# Patient Record
Sex: MALE | Race: WHITE | NOT HISPANIC OR LATINO | Employment: OTHER | URBAN - METROPOLITAN AREA
[De-identification: names, ages, dates, MRNs, and addresses within clinical notes are randomized per-mention and may not be internally consistent; named-entity substitution may affect disease eponyms.]

---

## 2017-01-17 ENCOUNTER — HOSPITAL ENCOUNTER (OUTPATIENT)
Dept: CARDIOLOGY | Facility: CLINIC | Age: 32
Discharge: HOME | End: 2017-01-17
Payer: MEDICAID

## 2017-01-17 ENCOUNTER — OFFICE VISIT (OUTPATIENT)
Dept: ELECTROPHYSIOLOGY | Facility: CLINIC | Age: 32
End: 2017-01-17
Payer: MEDICAID

## 2017-01-17 VITALS
DIASTOLIC BLOOD PRESSURE: 72 MMHG | WEIGHT: 168 LBS | BODY MASS INDEX: 26.37 KG/M2 | SYSTOLIC BLOOD PRESSURE: 124 MMHG | HEART RATE: 81 BPM | HEIGHT: 67 IN

## 2017-01-17 DIAGNOSIS — Z86.79 S/P ABLATION OF ATRIAL FIBRILLATION: ICD-10-CM

## 2017-01-17 DIAGNOSIS — Z86.79 S/P ABLATION OF ATRIAL FLUTTER: ICD-10-CM

## 2017-01-17 DIAGNOSIS — I42.2 HYPERTROPHIC CARDIOMYOPATHY: ICD-10-CM

## 2017-01-17 DIAGNOSIS — Z98.890 S/P ABLATION OF ATRIAL FLUTTER: ICD-10-CM

## 2017-01-17 DIAGNOSIS — G11.11 FRIEDREICH ATAXIA: ICD-10-CM

## 2017-01-17 DIAGNOSIS — I48.3 TYPICAL ATRIAL FLUTTER: ICD-10-CM

## 2017-01-17 DIAGNOSIS — Z98.890 S/P ABLATION OF ATRIAL FIBRILLATION: ICD-10-CM

## 2017-01-17 DIAGNOSIS — I48.91 ATRIAL FIBRILLATION, UNSPECIFIED TYPE: Primary | ICD-10-CM

## 2017-01-17 DIAGNOSIS — I48.92 ATRIAL FLUTTER, UNSPECIFIED TYPE: ICD-10-CM

## 2017-01-17 PROCEDURE — 99214 OFFICE O/P EST MOD 30 MIN: CPT | Mod: S$PBB,,, | Performed by: INTERNAL MEDICINE

## 2017-01-17 PROCEDURE — 93010 ELECTROCARDIOGRAM REPORT: CPT | Mod: S$PBB,,, | Performed by: INTERNAL MEDICINE

## 2017-01-17 PROCEDURE — 99213 OFFICE O/P EST LOW 20 MIN: CPT | Mod: PBBFAC | Performed by: INTERNAL MEDICINE

## 2017-01-17 PROCEDURE — 99999 PR PBB SHADOW E&M-EST. PATIENT-LVL III: CPT | Mod: PBBFAC,,, | Performed by: INTERNAL MEDICINE

## 2017-01-17 RX ORDER — METOPROLOL SUCCINATE 25 MG/1
100 TABLET, EXTENDED RELEASE ORAL DAILY
COMMUNITY

## 2017-01-17 NOTE — PROGRESS NOTES
Subjective:   HPI    Referring: Reggie Rodrigues MD  Cardiologist: Valdemar Kahn MD    I had the pleasure of seeing Anuj Caldwell in follow-up today for his history of atrial arrhythmias. He is a 31-year old with a history of Friedreich ataxia and hypertrophic cardiomyopathy, whose history of arrhythmias dates back many years. He has a longstanding history of episodic chest pain, palpitations, and shortness of breath. Roughly 5-years ago he as diagnosed with AF, and was placed on Sotalol. He continued to have breakthrough episodes of symptomatic AF (some of which resulted in ED admissions). Several months ago, he was switched to Amiodarone. Unfortunately, Mr. Caldwell was recently admitted to [facility] with symptomatic AF, which manifested with his typical symptom constellation. He spontaneously reverted to sinus rhythm, and was discharged the following day. Based on home heart rate measurements, he has at least an episode per day, which generally last 30 minutes. Episodes have become more frequent and severe over the years.    Recent cardiac studies include a 48-hour Holter monitor performed in 3/2015, which showed sinus rhythm with episodes of atrial fibrillation and atrial flutter (likely typical). Although episodes of NSVT up to 6 beats were commented on in the Holter monitor summary, strips available to me in the office in 6/2015 showed NSVT up to 3-beats duration.    On 9/2/2015, a PVI and CTI-line was performed. He was discharged on Amiodarone. A pre-discharge TTE showed a preserved EF with no evidence of hypertrophy. Mr. Caldwell had no palpitations following ablation. Amiodarone was stopped in 12/2015.  In 4/2016, Mr. Caldwell was complaining of intermittent chest pains. A 48-hour Holter monitor was performed, which was benign.     Mr. Caldwell has had no recurrent arrhythmias since his ablation.    I reviewed today's ECG tracing, which shows sinus rhythm.    Review of Systems   Constitution: Negative for decreased  appetite, malaise/fatigue, weight gain and weight loss.   HENT: Negative for sore throat.    Eyes: Negative for blurred vision.   Cardiovascular: Negative for chest pain, dyspnea on exertion, irregular heartbeat, leg swelling, near-syncope, orthopnea, palpitations, paroxysmal nocturnal dyspnea and syncope.   Respiratory: Negative for shortness of breath.    Skin: Negative for rash.   Musculoskeletal: Negative for arthritis.   Gastrointestinal: Negative for abdominal pain.   Neurological: Positive for disturbances in coordination. Negative for focal weakness.   Psychiatric/Behavioral: Negative for altered mental status.        Objective:    Physical Exam   Constitutional: He is oriented to person, place, and time. He appears well-developed and well-nourished. No distress.   HENT:   Head: Normocephalic and atraumatic.   Mouth/Throat: Oropharynx is clear and moist.   Eyes: Pupils are equal, round, and reactive to light. No scleral icterus.   Neck: Neck supple. No thyromegaly present.   Cardiovascular: Regular rhythm, normal heart sounds and normal pulses.  Tachycardia present.  Exam reveals no gallop and no friction rub.    No murmur heard.  Pulmonary/Chest: Effort normal and breath sounds normal. He has no rales.   Abdominal: Soft. Bowel sounds are normal. He exhibits no distension. There is no tenderness.   Musculoskeletal: He exhibits no edema.   Neurological: He is alert and oriented to person, place, and time.   Skin: Skin is warm and dry. No rash noted.   Psychiatric: He has a normal mood and affect. His behavior is normal.   Vitals reviewed.        Assessment:       1. Atrial fibrillation, unspecified type    2. S/P ablation of atrial fibrillation    3. S/P ablation of atrial flutter    4. Hypertrophic cardiomyopathy    5. Typical atrial flutter    6. Friedreich ataxia         Plan:   In summary, Anuj Caldwell is a 31-year old male with a history of Freidreich ataxia, hypertrophic cardiomyopathy, and symptomatic  atrial arrhythmias which were refractory to Sotalol and Amiodarone. He is now roughly 16-months status-post PVI, and feeling excellent. The plan is to continue Aspirin    He will follow-up with me in 12 months or sooner if needed.    Thank you for allowing me to participate in the care of this patient. Please do not hesitate to call me with any questions or concerns.

## 2017-05-10 DIAGNOSIS — I48.91 ATRIAL FIBRILLATION, UNSPECIFIED TYPE: ICD-10-CM

## 2017-05-10 RX ORDER — DILTIAZEM HYDROCHLORIDE 240 MG/1
240 CAPSULE, EXTENDED RELEASE ORAL 2 TIMES DAILY
Qty: 60 CAPSULE | Refills: 11 | Status: SHIPPED | OUTPATIENT
Start: 2017-05-10 | End: 2018-05-07 | Stop reason: SDUPTHER

## 2017-09-19 ENCOUNTER — HISTORICAL (OUTPATIENT)
Dept: FAMILY MEDICINE | Facility: CLINIC | Age: 32
End: 2017-09-19

## 2018-02-02 ENCOUNTER — HISTORICAL (OUTPATIENT)
Dept: ADMINISTRATIVE | Facility: HOSPITAL | Age: 33
End: 2018-02-02

## 2018-02-27 DIAGNOSIS — I48.91 ATRIAL FIBRILLATION, UNSPECIFIED TYPE: ICD-10-CM

## 2018-02-27 DIAGNOSIS — I48.92 ATRIAL FLUTTER, UNSPECIFIED TYPE: Primary | ICD-10-CM

## 2018-03-05 ENCOUNTER — OFFICE VISIT (OUTPATIENT)
Dept: ELECTROPHYSIOLOGY | Facility: CLINIC | Age: 33
End: 2018-03-05
Payer: MEDICAID

## 2018-03-05 ENCOUNTER — HOSPITAL ENCOUNTER (OUTPATIENT)
Dept: CARDIOLOGY | Facility: CLINIC | Age: 33
Discharge: HOME | End: 2018-03-05
Payer: MEDICAID

## 2018-03-05 VITALS
BODY MASS INDEX: 26.52 KG/M2 | SYSTOLIC BLOOD PRESSURE: 136 MMHG | DIASTOLIC BLOOD PRESSURE: 82 MMHG | HEART RATE: 76 BPM | HEIGHT: 66 IN | WEIGHT: 165 LBS

## 2018-03-05 DIAGNOSIS — I48.3 TYPICAL ATRIAL FLUTTER: ICD-10-CM

## 2018-03-05 DIAGNOSIS — I42.2 HYPERTROPHIC CARDIOMYOPATHY: ICD-10-CM

## 2018-03-05 DIAGNOSIS — Z98.890 S/P ABLATION OF ATRIAL FLUTTER: ICD-10-CM

## 2018-03-05 DIAGNOSIS — Z86.79 S/P ABLATION OF ATRIAL FLUTTER: ICD-10-CM

## 2018-03-05 DIAGNOSIS — I48.92 ATRIAL FLUTTER, UNSPECIFIED TYPE: ICD-10-CM

## 2018-03-05 DIAGNOSIS — G11.11 FRIEDREICH ATAXIA: ICD-10-CM

## 2018-03-05 DIAGNOSIS — I48.91 ATRIAL FIBRILLATION, UNSPECIFIED TYPE: ICD-10-CM

## 2018-03-05 DIAGNOSIS — M41.9 SCOLIOSIS, UNSPECIFIED SCOLIOSIS TYPE, UNSPECIFIED SPINAL REGION: ICD-10-CM

## 2018-03-05 DIAGNOSIS — Z86.79 S/P ABLATION OF ATRIAL FIBRILLATION: ICD-10-CM

## 2018-03-05 DIAGNOSIS — I48.0 PAROXYSMAL ATRIAL FIBRILLATION: Primary | ICD-10-CM

## 2018-03-05 DIAGNOSIS — Z98.890 S/P ABLATION OF ATRIAL FIBRILLATION: ICD-10-CM

## 2018-03-05 PROCEDURE — 99999 PR PBB SHADOW E&M-EST. PATIENT-LVL III: CPT | Mod: PBBFAC,,, | Performed by: INTERNAL MEDICINE

## 2018-03-05 PROCEDURE — 99214 OFFICE O/P EST MOD 30 MIN: CPT | Mod: S$PBB,,, | Performed by: INTERNAL MEDICINE

## 2018-03-05 PROCEDURE — 93005 ELECTROCARDIOGRAM TRACING: CPT | Mod: PBBFAC | Performed by: INTERNAL MEDICINE

## 2018-03-05 PROCEDURE — 93010 ELECTROCARDIOGRAM REPORT: CPT | Mod: S$PBB,,, | Performed by: INTERNAL MEDICINE

## 2018-03-05 PROCEDURE — 99213 OFFICE O/P EST LOW 20 MIN: CPT | Mod: PBBFAC | Performed by: INTERNAL MEDICINE

## 2018-03-05 NOTE — PROGRESS NOTES
Subjective:   HPI    EPS: Reggie Rodrigues MD  Cardiologist: Valdemar Kahn MD    I had the pleasure of seeing Anuj Caldwell in follow-up today for his history of atrial arrhythmias. He is a 32-year old with a history of Friedreich ataxia and hypertrophic cardiomyopathy, whose history of arrhythmias dates back many years. He has a longstanding history of episodic chest pain, palpitations, and shortness of breath. Roughly 5-years ago he was diagnosed with AF, and was placed on Sotalol. He continued to have breakthrough episodes of symptomatic AF (some of which resulted in ED admissions). Several months ago, he was switched to Amiodarone. Unfortunately, Mr. Caldwell was recently admitted to [facility] with symptomatic AF, which manifested with his typical symptom constellation. He spontaneously reverted to sinus rhythm, and was discharged the following day. Based on home heart rate measurements, he has at least an episode per day, which generally last 30 minutes. Episodes have become more frequent and severe over the years.    Recent cardiac studies include a 48-hour Holter monitor performed in 3/2015, which showed sinus rhythm with episodes of atrial fibrillation and atrial flutter (likely typical). Although episodes of NSVT up to 6 beats were commented on in the Holter monitor summary, strips available to me in the office in 6/2015 showed NSVT up to 3-beats duration.    In 9/2015, a PVI and CTI-line were performed. He was discharged on Amiodarone. A pre-discharge TTE showed a preserved EF with no evidence of hypertrophy. Mr. Caldwell had no palpitations following ablation. Amiodarone was stopped in 12/2015.  In 4/2016, Mr. Caldwell was complaining of intermittent chest pains. A 48-hour Holter monitor was performed, which was benign. At his 1/2017 visit he was doing well, with no evidence of AF recurrence. Today in the office Mr. Caldwell is doing well from an arrhythmia standpoint, with no documented AF recurrence.      Paulette's primary complaint in the office today is scoliosis, which is part of the Friedreich ataxia progression. This is causing him baseline shortness of breath. He is asking for a consultation with orthopedic surgery.    I reviewed today's ECG tracing, which shows sinus rhythm.    Review of Systems   Constitution: Negative for decreased appetite, malaise/fatigue, weight gain and weight loss.   HENT: Negative for sore throat.    Eyes: Negative for blurred vision.   Cardiovascular: Negative for chest pain, dyspnea on exertion, irregular heartbeat, leg swelling, near-syncope, orthopnea, palpitations, paroxysmal nocturnal dyspnea and syncope.   Respiratory: Negative for shortness of breath.    Skin: Negative for rash.   Musculoskeletal: Negative for arthritis.   Gastrointestinal: Negative for abdominal pain.   Neurological: Positive for disturbances in coordination. Negative for focal weakness.   Psychiatric/Behavioral: Negative for altered mental status.        Objective:    Physical Exam   Constitutional: He is oriented to person, place, and time. He appears well-developed and well-nourished. No distress.   HENT:   Head: Normocephalic and atraumatic.   Mouth/Throat: Oropharynx is clear and moist.   Eyes: Pupils are equal, round, and reactive to light. No scleral icterus.   Neck: Neck supple. No thyromegaly present.   Cardiovascular: Normal rate, regular rhythm, normal heart sounds and normal pulses.  Exam reveals no gallop and no friction rub.    No murmur heard.  Pulmonary/Chest: Effort normal and breath sounds normal. He has no rales.   Abdominal: Soft. Bowel sounds are normal. He exhibits no distension. There is no tenderness.   Musculoskeletal: He exhibits no edema.   Neurological: He is alert and oriented to person, place, and time.   Skin: Skin is warm and dry. No rash noted.   Psychiatric: He has a normal mood and affect. His behavior is normal.   Vitals reviewed.        Assessment:       1. Paroxysmal atrial  fibrillation    2. Typical atrial flutter    3. S/P ablation of atrial fibrillation    4. S/P ablation of atrial flutter    5. Friedreich ataxia    6. Hypertrophic cardiomyopathy         Plan:   In summary, Anuj Caldwell is a 32-year old male with a history of Freidreich ataxia, hypertrophic cardiomyopathy, and symptomatic atrial arrhythmias which were refractory to Sotalol and Amiodarone. He is now well over 2 years post PVI, with no arrhythmia recurrences.    The plan is for referral to orthopedics.    He will follow-up with me in 12 months or sooner if needed.    Thank you for allowing me to participate in the care of this patient. Please do not hesitate to call me with any questions or concerns.

## 2018-03-06 ENCOUNTER — TELEPHONE (OUTPATIENT)
Dept: ORTHOPEDICS | Facility: CLINIC | Age: 33
End: 2018-03-06

## 2018-03-06 NOTE — TELEPHONE ENCOUNTER
----- Message from Jessica Brady MA sent at 3/5/2018  3:40 PM CST -----  Please call pt to schedule new pt appt. There is a referral in from Dr Noyola. Please call pt's sister Radha Mayer at [phone]. Thanks

## 2018-03-06 NOTE — TELEPHONE ENCOUNTER
Ortho Telephone Triage Message  [phone]  Spoke with pt's sister, Radha Mayer, who states that pt - and another sibling -  have HO muscular dystrophy. States pt has been seen by PCP at [facility] in [place], but has not be seen by specialist  for muscular dystrophy since childhood. States pt's scoliosis is affecting his breathing. Requests Ortho appt for pt per Dr. Noyola/[facility] referral for scoliosis.  HX:Muscular Dystrophy  Triage Advice: Advised sister that [facility], does not, presently, see spine conditions under Medicaid and that [facility] and [facility] will be notified to offer assistance. [facility] appt contact number: [phone]  provided to sister, as well, for any timelier appt that may be scheduled there under pt's Medicaid insurance.  Resolution: Sister states understanding and will await any follow up calls and/or contact [facility], if needed.

## 2018-03-13 ENCOUNTER — TELEPHONE (OUTPATIENT)
Dept: ELECTROPHYSIOLOGY | Facility: CLINIC | Age: 33
End: 2018-03-13

## 2018-03-13 NOTE — TELEPHONE ENCOUNTER
I spoke to Radha the sister [phone], and I fax Orthopedics Referral Orders that hannah Toscano put in.  To [phone] [facility].

## 2018-03-13 NOTE — TELEPHONE ENCOUNTER
----- Message from Gaye Packer sent at 3/13/2018 10:45 AM CDT -----  Contact: pt sister/Radha  Please call pt sister at [phone]. [facility] fax# [phone] Orthopaedic services    Thank you

## 2018-04-04 ENCOUNTER — HISTORICAL (OUTPATIENT)
Dept: ADMINISTRATIVE | Facility: HOSPITAL | Age: 33
End: 2018-04-04

## 2018-05-07 DIAGNOSIS — I48.91 ATRIAL FIBRILLATION, UNSPECIFIED TYPE: ICD-10-CM

## 2018-05-07 RX ORDER — DILTIAZEM HYDROCHLORIDE 240 MG/1
240 CAPSULE, EXTENDED RELEASE ORAL DAILY
Qty: 60 CAPSULE | Refills: 11 | Status: SHIPPED | OUTPATIENT
Start: 2018-05-07 | End: 2018-06-26 | Stop reason: SDUPTHER

## 2018-06-26 DIAGNOSIS — I48.91 ATRIAL FIBRILLATION, UNSPECIFIED TYPE: ICD-10-CM

## 2018-06-26 RX ORDER — DILTIAZEM HYDROCHLORIDE 240 MG/1
240 CAPSULE, EXTENDED RELEASE ORAL DAILY
Qty: 60 CAPSULE | Refills: 11 | Status: SHIPPED | OUTPATIENT
Start: 2018-06-26 | End: 2023-06-06

## 2018-07-30 ENCOUNTER — HISTORICAL (OUTPATIENT)
Dept: FAMILY MEDICINE | Facility: CLINIC | Age: 33
End: 2018-07-30

## 2018-08-02 ENCOUNTER — HISTORICAL (OUTPATIENT)
Dept: RADIOLOGY | Facility: HOSPITAL | Age: 33
End: 2018-08-02

## 2018-08-24 ENCOUNTER — HISTORICAL (OUTPATIENT)
Dept: RADIOLOGY | Facility: HOSPITAL | Age: 33
End: 2018-08-24

## 2019-12-09 ENCOUNTER — HISTORICAL (OUTPATIENT)
Dept: ADMINISTRATIVE | Facility: HOSPITAL | Age: 34
End: 2019-12-09

## 2019-12-09 LAB
AMPHET UR QL SCN: NEGATIVE
BARBITURATE SCN PRESENT UR: NEGATIVE
BENZODIAZ UR QL SCN: POSITIVE
CANNABINOIDS UR QL SCN: NEGATIVE
COCAINE UR QL SCN: NEGATIVE
CREAT UR-MCNC: 162 MG/DL
MICROALBUMIN UR-MCNC: 17.7 MG/L (ref 0–19)
MICROALBUMIN/CREAT RATIO PNL UR: 10.9 MCG/MG CR (ref 0–29)
OPIATES UR QL SCN: NEGATIVE
PCP UR QL: NEGATIVE
PH UR STRIP.AUTO: 6 [PH] (ref 5–8)
TEMPERATURE, URINE (OHS): 20 DEGC (ref 20–25)

## 2020-02-07 ENCOUNTER — HISTORICAL (OUTPATIENT)
Dept: FAMILY MEDICINE | Facility: CLINIC | Age: 35
End: 2020-02-07

## 2021-02-17 ENCOUNTER — HOSPITAL ENCOUNTER (OUTPATIENT)
Dept: MEDSURG UNIT | Facility: HOSPITAL | Age: 36
End: 2021-03-01
Attending: FAMILY MEDICINE | Admitting: FAMILY MEDICINE

## 2021-02-17 LAB
ABS NEUT (OLG): 7.79 X10(3)/MCL (ref 2.1–9.2)
ALBUMIN SERPL-MCNC: 4.4 GM/DL (ref 3.5–5)
ALBUMIN/GLOB SERPL: 1.1 RATIO (ref 1.1–2)
ALP SERPL-CCNC: 141 UNIT/L (ref 40–150)
ALT SERPL-CCNC: 53 UNIT/L (ref 0–55)
AMPHET UR QL SCN: NEGATIVE
APAP SERPL-MCNC: <17.4 UG/ML (ref 17.4–30)
APPEARANCE, UA: CLEAR
AST SERPL-CCNC: 34 UNIT/L (ref 5–34)
BACTERIA #/AREA URNS AUTO: ABNORMAL /HPF
BARBITURATE SCN PRESENT UR: NEGATIVE
BASOPHILS # BLD AUTO: 0.1 X10(3)/MCL (ref 0–0.2)
BASOPHILS NFR BLD AUTO: 1 %
BENZODIAZ UR QL SCN: NEGATIVE
BILIRUB SERPL-MCNC: 0.5 MG/DL
BILIRUB UR QL STRIP: NEGATIVE
BILIRUBIN DIRECT+TOT PNL SERPL-MCNC: 0.2 MG/DL (ref 0–0.5)
BILIRUBIN DIRECT+TOT PNL SERPL-MCNC: 0.3 MG/DL (ref 0–0.8)
BUN SERPL-MCNC: 12 MG/DL (ref 8.9–20.6)
CALCIUM SERPL-MCNC: 10 MG/DL (ref 8.4–10.2)
CANNABINOIDS UR QL SCN: POSITIVE
CHLORIDE SERPL-SCNC: 101 MMOL/L (ref 98–107)
CO2 SERPL-SCNC: 23 MMOL/L (ref 22–29)
COCAINE UR QL SCN: NEGATIVE
COLOR UR: YELLOW
CREAT SERPL-MCNC: 1.09 MG/DL (ref 0.73–1.18)
EOSINOPHIL # BLD AUTO: 0 X10(3)/MCL (ref 0–0.9)
EOSINOPHIL NFR BLD AUTO: 0 %
ERYTHROCYTE [DISTWIDTH] IN BLOOD BY AUTOMATED COUNT: 14.2 % (ref 11.5–14.5)
ETHANOL SERPL-MCNC: <10 MG/DL
GLOBULIN SER-MCNC: 3.9 GM/DL (ref 2.4–3.5)
GLUCOSE (UA): NEGATIVE
GLUCOSE SERPL-MCNC: 87 MG/DL (ref 74–100)
HCT VFR BLD AUTO: 53.4 % (ref 40–51)
HGB BLD-MCNC: 18 GM/DL (ref 13.5–17.5)
HGB UR QL STRIP: NEGATIVE
HYALINE CASTS #/AREA URNS LPF: ABNORMAL /LPF
IMM GRANULOCYTES # BLD AUTO: 0.02 10*3/UL
IMM GRANULOCYTES NFR BLD AUTO: 0 %
KETONES UR QL STRIP: 20 MG/DL
LEUKOCYTE ESTERASE UR QL STRIP: NEGATIVE
LYMPHOCYTES # BLD AUTO: 1.9 X10(3)/MCL (ref 0.6–4.6)
LYMPHOCYTES NFR BLD AUTO: 18 %
MCH RBC QN AUTO: 29.5 PG (ref 26–34)
MCHC RBC AUTO-ENTMCNC: 33.7 GM/DL (ref 31–37)
MCV RBC AUTO: 87.5 FL (ref 80–100)
MONOCYTES # BLD AUTO: 0.8 X10(3)/MCL (ref 0.1–1.3)
MONOCYTES NFR BLD AUTO: 8 %
NEUTROPHILS # BLD AUTO: 7.79 X10(3)/MCL (ref 2.1–9.2)
NEUTROPHILS NFR BLD AUTO: 73 %
NITRITE UR QL STRIP: NEGATIVE
OPIATES UR QL SCN: NEGATIVE
PCP UR QL: NEGATIVE
PH UR STRIP.AUTO: 6.5 [PH] (ref 3–11)
PH UR STRIP: 6.5 [PH] (ref 4.5–8)
PLATELET # BLD AUTO: 304 X10(3)/MCL (ref 130–400)
PMV BLD AUTO: 10.5 FL (ref 7.4–10.4)
POTASSIUM SERPL-SCNC: 4 MMOL/L (ref 3.5–5.1)
PROT SERPL-MCNC: 8.3 GM/DL (ref 6.4–8.3)
PROT UR QL STRIP: 20 MG/DL
RBC # BLD AUTO: 6.1 X10(6)/MCL (ref 4.5–5.9)
RBC #/AREA URNS AUTO: ABNORMAL /HPF
SALICYLATES SERPL-MCNC: <5 MG/DL
SODIUM SERPL-SCNC: 138 MMOL/L (ref 136–145)
SP GR UR STRIP: 1.02 (ref 1–1.03)
SQUAMOUS #/AREA URNS LPF: ABNORMAL /LPF
TROPONIN I SERPL-MCNC: 0.04 NG/ML (ref 0–0.04)
TROPONIN I SERPL-MCNC: 0.05 NG/ML (ref 0–0.04)
TSH SERPL-ACNC: 0.53 UIU/ML (ref 0.35–4.94)
UROBILINOGEN UR STRIP-ACNC: NORMAL
WBC # SPEC AUTO: 10.6 X10(3)/MCL (ref 4.5–11)
WBC #/AREA URNS AUTO: ABNORMAL /HPF

## 2021-02-18 LAB
CK SERPL-CCNC: 99 U/L (ref 30–200)
SARS-COV-2 AG RESP QL IA.RAPID: NEGATIVE

## 2021-02-24 LAB
ABS NEUT (OLG): 3.88 X10(3)/MCL (ref 2.1–9.2)
ALBUMIN SERPL-MCNC: 4 GM/DL (ref 3.5–5)
ALBUMIN/GLOB SERPL: 1.2 RATIO (ref 1.1–2)
ALP SERPL-CCNC: 125 UNIT/L (ref 40–150)
ALT SERPL-CCNC: 59 UNIT/L (ref 0–55)
APTT PPP: 28.7 SECOND(S) (ref 23.3–37)
AST SERPL-CCNC: 25 UNIT/L (ref 5–34)
BASOPHILS # BLD AUTO: 0.1 X10(3)/MCL (ref 0–0.2)
BASOPHILS NFR BLD AUTO: 1 %
BILIRUB SERPL-MCNC: 0.4 MG/DL
BILIRUBIN DIRECT+TOT PNL SERPL-MCNC: 0.2 MG/DL (ref 0–0.5)
BILIRUBIN DIRECT+TOT PNL SERPL-MCNC: 0.2 MG/DL (ref 0–0.8)
BNP BLD-MCNC: <10 PG/ML
BUN SERPL-MCNC: 12.2 MG/DL (ref 8.9–20.6)
CALCIUM SERPL-MCNC: 9 MG/DL (ref 8.4–10.2)
CHLORIDE SERPL-SCNC: 101 MMOL/L (ref 98–107)
CO2 SERPL-SCNC: 23 MMOL/L (ref 22–29)
CREAT SERPL-MCNC: 1.18 MG/DL (ref 0.73–1.18)
EOSINOPHIL # BLD AUTO: 0.5 X10(3)/MCL (ref 0–0.9)
EOSINOPHIL NFR BLD AUTO: 6 %
ERYTHROCYTE [DISTWIDTH] IN BLOOD BY AUTOMATED COUNT: 13.7 % (ref 11.5–14.5)
GLOBULIN SER-MCNC: 3.2 GM/DL (ref 2.4–3.5)
GLUCOSE SERPL-MCNC: 121 MG/DL (ref 74–100)
HCT VFR BLD AUTO: 49.7 % (ref 40–51)
HGB BLD-MCNC: 16.9 GM/DL (ref 13.5–17.5)
IMM GRANULOCYTES # BLD AUTO: 0.02 10*3/UL
IMM GRANULOCYTES NFR BLD AUTO: 0 %
INR PPP: 1.02 (ref 0.9–1.2)
LYMPHOCYTES # BLD AUTO: 3.2 X10(3)/MCL (ref 0.6–4.6)
LYMPHOCYTES NFR BLD AUTO: 36 %
MCH RBC QN AUTO: 29.4 PG (ref 26–34)
MCHC RBC AUTO-ENTMCNC: 34 GM/DL (ref 31–37)
MCV RBC AUTO: 86.6 FL (ref 80–100)
MONOCYTES # BLD AUTO: 1.1 X10(3)/MCL (ref 0.1–1.3)
MONOCYTES NFR BLD AUTO: 13 %
NEUTROPHILS # BLD AUTO: 3.88 X10(3)/MCL (ref 2.1–9.2)
NEUTROPHILS NFR BLD AUTO: 44 %
PLATELET # BLD AUTO: 272 X10(3)/MCL (ref 130–400)
PMV BLD AUTO: 9.9 FL (ref 7.4–10.4)
POTASSIUM SERPL-SCNC: 3.8 MMOL/L (ref 3.5–5.1)
PROT SERPL-MCNC: 7.2 GM/DL (ref 6.4–8.3)
PROTHROMBIN TIME: 13.1 SECOND(S) (ref 11.9–14.4)
RBC # BLD AUTO: 5.74 X10(6)/MCL (ref 4.5–5.9)
SODIUM SERPL-SCNC: 136 MMOL/L (ref 136–145)
TROPONIN I SERPL-MCNC: 0.12 NG/ML (ref 0–0.04)
WBC # SPEC AUTO: 8.8 X10(3)/MCL (ref 4.5–11)

## 2021-02-25 LAB
ABS NEUT (OLG): 2.13 X10(3)/MCL (ref 2.1–9.2)
ALBUMIN SERPL-MCNC: 3.9 GM/DL (ref 3.5–5)
ALBUMIN/GLOB SERPL: 1.2 RATIO (ref 1.1–2)
ALP SERPL-CCNC: 129 UNIT/L (ref 40–150)
ALT SERPL-CCNC: 114 UNIT/L (ref 0–55)
AMPHET UR QL SCN: NEGATIVE
APTT PPP: 135.2 SECOND(S) (ref 23.3–37)
APTT PPP: 47.2 SECOND(S) (ref 23.3–37)
APTT PPP: 51 SECOND(S) (ref 23.3–37)
AST SERPL-CCNC: 67 UNIT/L (ref 5–34)
BARBITURATE SCN PRESENT UR: NEGATIVE
BASOPHILS # BLD AUTO: 0.1 X10(3)/MCL (ref 0–0.2)
BASOPHILS NFR BLD AUTO: 1 %
BENZODIAZ UR QL SCN: NEGATIVE
BILIRUB SERPL-MCNC: 0.4 MG/DL
BILIRUBIN DIRECT+TOT PNL SERPL-MCNC: 0.2 MG/DL (ref 0–0.5)
BILIRUBIN DIRECT+TOT PNL SERPL-MCNC: 0.2 MG/DL (ref 0–0.8)
BUN SERPL-MCNC: 12.8 MG/DL (ref 8.9–20.6)
CALCIUM SERPL-MCNC: 9.1 MG/DL (ref 8.4–10.2)
CANNABINOIDS UR QL SCN: NEGATIVE
CHLORIDE SERPL-SCNC: 100 MMOL/L (ref 98–107)
CO2 SERPL-SCNC: 30 MMOL/L (ref 22–29)
COCAINE UR QL SCN: NEGATIVE
CREAT SERPL-MCNC: 1.43 MG/DL (ref 0.73–1.18)
EOSINOPHIL # BLD AUTO: 0.5 X10(3)/MCL (ref 0–0.9)
EOSINOPHIL NFR BLD AUTO: 7 %
ERYTHROCYTE [DISTWIDTH] IN BLOOD BY AUTOMATED COUNT: 13.9 % (ref 11.5–14.5)
GLOBULIN SER-MCNC: 3.2 GM/DL (ref 2.4–3.5)
GLUCOSE SERPL-MCNC: 91 MG/DL (ref 74–100)
HCT VFR BLD AUTO: 53 % (ref 40–51)
HGB BLD-MCNC: 17.5 GM/DL (ref 13.5–17.5)
IMM GRANULOCYTES # BLD AUTO: 0.01 10*3/UL
IMM GRANULOCYTES NFR BLD AUTO: 0 %
LYMPHOCYTES # BLD AUTO: 3.4 X10(3)/MCL (ref 0.6–4.6)
LYMPHOCYTES NFR BLD AUTO: 50 %
MCH RBC QN AUTO: 29.8 PG (ref 26–34)
MCHC RBC AUTO-ENTMCNC: 33 GM/DL (ref 31–37)
MCV RBC AUTO: 90.3 FL (ref 80–100)
MONOCYTES # BLD AUTO: 0.7 X10(3)/MCL (ref 0.1–1.3)
MONOCYTES NFR BLD AUTO: 10 %
NEUTROPHILS # BLD AUTO: 2.13 X10(3)/MCL (ref 2.1–9.2)
NEUTROPHILS NFR BLD AUTO: 32 %
OPIATES UR QL SCN: NEGATIVE
PCP UR QL: NEGATIVE
PH UR STRIP.AUTO: 6.5 [PH] (ref 3–11)
PLATELET # BLD AUTO: 253 X10(3)/MCL (ref 130–400)
PMV BLD AUTO: 10.4 FL (ref 7.4–10.4)
POTASSIUM SERPL-SCNC: 4.1 MMOL/L (ref 3.5–5.1)
PROT SERPL-MCNC: 7.1 GM/DL (ref 6.4–8.3)
RBC # BLD AUTO: 5.87 X10(6)/MCL (ref 4.5–5.9)
SODIUM SERPL-SCNC: 139 MMOL/L (ref 136–145)
TROPONIN I SERPL-MCNC: 0.05 NG/ML (ref 0–0.04)
TROPONIN I SERPL-MCNC: 0.08 NG/ML (ref 0–0.04)
TROPONIN I SERPL-MCNC: 0.19 NG/ML (ref 0–0.04)
TSH SERPL-ACNC: 1.09 UIU/ML (ref 0.35–4.94)
WBC # SPEC AUTO: 6.8 X10(3)/MCL (ref 4.5–11)

## 2021-02-26 LAB
ABS NEUT (OLG): 1.96 X10(3)/MCL (ref 2.1–9.2)
ALBUMIN SERPL-MCNC: 3.7 GM/DL (ref 3.5–5)
ALBUMIN/GLOB SERPL: 1.3 RATIO (ref 1.1–2)
ALP SERPL-CCNC: 113 UNIT/L (ref 40–150)
ALT SERPL-CCNC: 89 UNIT/L (ref 0–55)
APTT PPP: 60.4 SECOND(S) (ref 23.3–37)
AST SERPL-CCNC: 32 UNIT/L (ref 5–34)
BASOPHILS # BLD AUTO: 0.1 X10(3)/MCL (ref 0–0.2)
BASOPHILS NFR BLD AUTO: 1 %
BILIRUB SERPL-MCNC: 0.4 MG/DL
BILIRUBIN DIRECT+TOT PNL SERPL-MCNC: 0.2 MG/DL (ref 0–0.5)
BILIRUBIN DIRECT+TOT PNL SERPL-MCNC: 0.2 MG/DL (ref 0–0.8)
BUN SERPL-MCNC: 12.9 MG/DL (ref 8.9–20.6)
CALCIUM SERPL-MCNC: 8.9 MG/DL (ref 8.4–10.2)
CHLORIDE SERPL-SCNC: 100 MMOL/L (ref 98–107)
CHOLEST SERPL-MCNC: 181 MG/DL
CHOLEST/HDLC SERPL: 4 {RATIO} (ref 0–5)
CO2 SERPL-SCNC: 28 MMOL/L (ref 22–29)
CREAT SERPL-MCNC: 1.3 MG/DL (ref 0.73–1.18)
EOSINOPHIL # BLD AUTO: 0.6 X10(3)/MCL (ref 0–0.9)
EOSINOPHIL NFR BLD AUTO: 8 %
ERYTHROCYTE [DISTWIDTH] IN BLOOD BY AUTOMATED COUNT: 13.8 % (ref 11.5–14.5)
GLOBULIN SER-MCNC: 2.9 GM/DL (ref 2.4–3.5)
GLUCOSE SERPL-MCNC: 95 MG/DL (ref 74–100)
HCT VFR BLD AUTO: 51.7 % (ref 40–51)
HDLC SERPL-MCNC: 41 MG/DL (ref 35–60)
HGB BLD-MCNC: 16.7 GM/DL (ref 13.5–17.5)
HIV 1+2 AB+HIV1 P24 AG SERPL QL IA: NONREACTIVE
IMM GRANULOCYTES # BLD AUTO: 0.01 10*3/UL
IMM GRANULOCYTES NFR BLD AUTO: 0 %
LDLC SERPL CALC-MCNC: 100 MG/DL (ref 50–140)
LYMPHOCYTES # BLD AUTO: 3.2 X10(3)/MCL (ref 0.6–4.6)
LYMPHOCYTES NFR BLD AUTO: 50 %
MCH RBC QN AUTO: 29.5 PG (ref 26–34)
MCHC RBC AUTO-ENTMCNC: 32.3 GM/DL (ref 31–37)
MCV RBC AUTO: 91.3 FL (ref 80–100)
MONOCYTES # BLD AUTO: 0.6 X10(3)/MCL (ref 0.1–1.3)
MONOCYTES NFR BLD AUTO: 10 %
NEUTROPHILS # BLD AUTO: 1.96 X10(3)/MCL (ref 2.1–9.2)
NEUTROPHILS NFR BLD AUTO: 30 %
PLATELET # BLD AUTO: 226 X10(3)/MCL (ref 130–400)
PMV BLD AUTO: 10.5 FL (ref 7.4–10.4)
POTASSIUM SERPL-SCNC: 4.3 MMOL/L (ref 3.5–5.1)
PROT SERPL-MCNC: 6.6 GM/DL (ref 6.4–8.3)
RBC # BLD AUTO: 5.66 X10(6)/MCL (ref 4.5–5.9)
SODIUM SERPL-SCNC: 138 MMOL/L (ref 136–145)
T PALLIDUM AB SER QL: NONREACTIVE
TRIGL SERPL-MCNC: 199 MG/DL (ref 34–140)
VLDLC SERPL CALC-MCNC: 40 MG/DL
WBC # SPEC AUTO: 6.4 X10(3)/MCL (ref 4.5–11)

## 2021-02-27 LAB
ABS NEUT (OLG): 2.89 X10(3)/MCL (ref 2.1–9.2)
ALBUMIN SERPL-MCNC: 3.8 GM/DL (ref 3.5–5)
ALBUMIN/GLOB SERPL: 1.2 RATIO (ref 1.1–2)
ALP SERPL-CCNC: 112 UNIT/L (ref 40–150)
ALT SERPL-CCNC: 76 UNIT/L (ref 0–55)
APTT PPP: 50.2 SECOND(S) (ref 23.3–37)
AST SERPL-CCNC: 25 UNIT/L (ref 5–34)
BASOPHILS # BLD AUTO: 0.1 X10(3)/MCL (ref 0–0.2)
BASOPHILS NFR BLD AUTO: 1 %
BILIRUB SERPL-MCNC: 0.3 MG/DL
BILIRUBIN DIRECT+TOT PNL SERPL-MCNC: 0.1 MG/DL (ref 0–0.5)
BILIRUBIN DIRECT+TOT PNL SERPL-MCNC: 0.2 MG/DL (ref 0–0.8)
BUN SERPL-MCNC: 12.7 MG/DL (ref 8.9–20.6)
CALCIUM SERPL-MCNC: 9.1 MG/DL (ref 8.4–10.2)
CHLORIDE SERPL-SCNC: 102 MMOL/L (ref 98–107)
CO2 SERPL-SCNC: 26 MMOL/L (ref 22–29)
CREAT SERPL-MCNC: 1.11 MG/DL (ref 0.73–1.18)
EOSINOPHIL # BLD AUTO: 0.6 X10(3)/MCL (ref 0–0.9)
EOSINOPHIL NFR BLD AUTO: 8 %
ERYTHROCYTE [DISTWIDTH] IN BLOOD BY AUTOMATED COUNT: 13.9 % (ref 11.5–14.5)
GLOBULIN SER-MCNC: 3.1 GM/DL (ref 2.4–3.5)
GLUCOSE SERPL-MCNC: 99 MG/DL (ref 74–100)
HCT VFR BLD AUTO: 50.3 % (ref 40–51)
HGB BLD-MCNC: 16.6 GM/DL (ref 13.5–17.5)
IMM GRANULOCYTES # BLD AUTO: 0.02 10*3/UL
IMM GRANULOCYTES NFR BLD AUTO: 0 %
LYMPHOCYTES # BLD AUTO: 3 X10(3)/MCL (ref 0.6–4.6)
LYMPHOCYTES NFR BLD AUTO: 41 %
MCH RBC QN AUTO: 30 PG (ref 26–34)
MCHC RBC AUTO-ENTMCNC: 33 GM/DL (ref 31–37)
MCV RBC AUTO: 90.8 FL (ref 80–100)
MONOCYTES # BLD AUTO: 0.7 X10(3)/MCL (ref 0.1–1.3)
MONOCYTES NFR BLD AUTO: 10 %
NEUTROPHILS # BLD AUTO: 2.89 X10(3)/MCL (ref 2.1–9.2)
NEUTROPHILS NFR BLD AUTO: 39 %
PLATELET # BLD AUTO: 245 X10(3)/MCL (ref 130–400)
PMV BLD AUTO: 10.7 FL (ref 7.4–10.4)
POTASSIUM SERPL-SCNC: 4.2 MMOL/L (ref 3.5–5.1)
PROT SERPL-MCNC: 6.9 GM/DL (ref 6.4–8.3)
RBC # BLD AUTO: 5.54 X10(6)/MCL (ref 4.5–5.9)
SODIUM SERPL-SCNC: 139 MMOL/L (ref 136–145)
WBC # SPEC AUTO: 7.3 X10(3)/MCL (ref 4.5–11)

## 2021-02-28 LAB
ABS NEUT (OLG): 3.18 X10(3)/MCL (ref 2.1–9.2)
ALBUMIN SERPL-MCNC: 3.8 GM/DL (ref 3.5–5)
ALBUMIN/GLOB SERPL: 1.2 RATIO (ref 1.1–2)
ALP SERPL-CCNC: 111 UNIT/L (ref 40–150)
ALT SERPL-CCNC: 91 UNIT/L (ref 0–55)
AST SERPL-CCNC: 38 UNIT/L (ref 5–34)
BASOPHILS # BLD AUTO: 0.1 X10(3)/MCL (ref 0–0.2)
BASOPHILS NFR BLD AUTO: 1 %
BILIRUB SERPL-MCNC: 0.3 MG/DL
BILIRUBIN DIRECT+TOT PNL SERPL-MCNC: 0.1 MG/DL (ref 0–0.8)
BILIRUBIN DIRECT+TOT PNL SERPL-MCNC: 0.2 MG/DL (ref 0–0.5)
BUN SERPL-MCNC: 14.2 MG/DL (ref 8.9–20.6)
CALCIUM SERPL-MCNC: 9.2 MG/DL (ref 8.4–10.2)
CHLORIDE SERPL-SCNC: 103 MMOL/L (ref 98–107)
CO2 SERPL-SCNC: 23 MMOL/L (ref 22–29)
CREAT SERPL-MCNC: 1.15 MG/DL (ref 0.73–1.18)
EOSINOPHIL # BLD AUTO: 0.6 X10(3)/MCL (ref 0–0.9)
EOSINOPHIL NFR BLD AUTO: 8 %
ERYTHROCYTE [DISTWIDTH] IN BLOOD BY AUTOMATED COUNT: 13.7 % (ref 11.5–14.5)
GLOBULIN SER-MCNC: 3.2 GM/DL (ref 2.4–3.5)
GLUCOSE SERPL-MCNC: 95 MG/DL (ref 74–100)
HCT VFR BLD AUTO: 49.8 % (ref 40–51)
HGB BLD-MCNC: 16.4 GM/DL (ref 13.5–17.5)
IMM GRANULOCYTES # BLD AUTO: 0.01 10*3/UL
IMM GRANULOCYTES NFR BLD AUTO: 0 %
LYMPHOCYTES # BLD AUTO: 3 X10(3)/MCL (ref 0.6–4.6)
LYMPHOCYTES NFR BLD AUTO: 40 %
MCH RBC QN AUTO: 29.5 PG (ref 26–34)
MCHC RBC AUTO-ENTMCNC: 32.9 GM/DL (ref 31–37)
MCV RBC AUTO: 89.7 FL (ref 80–100)
MONOCYTES # BLD AUTO: 0.7 X10(3)/MCL (ref 0.1–1.3)
MONOCYTES NFR BLD AUTO: 9 %
NEUTROPHILS # BLD AUTO: 3.18 X10(3)/MCL (ref 2.1–9.2)
NEUTROPHILS NFR BLD AUTO: 42 %
PLATELET # BLD AUTO: 228 X10(3)/MCL (ref 130–400)
PMV BLD AUTO: 10.6 FL (ref 7.4–10.4)
POTASSIUM SERPL-SCNC: 4 MMOL/L (ref 3.5–5.1)
PROT SERPL-MCNC: 7 GM/DL (ref 6.4–8.3)
RBC # BLD AUTO: 5.55 X10(6)/MCL (ref 4.5–5.9)
SODIUM SERPL-SCNC: 138 MMOL/L (ref 136–145)
WBC # SPEC AUTO: 7.6 X10(3)/MCL (ref 4.5–11)

## 2021-09-01 ENCOUNTER — HISTORICAL (OUTPATIENT)
Dept: ADMINISTRATIVE | Facility: HOSPITAL | Age: 36
End: 2021-09-01

## 2021-09-01 LAB
APPEARANCE, UA: ABNORMAL
BACTERIA SPEC CULT: ABNORMAL /HPF
BILIRUB UR QL STRIP: NEGATIVE
COLOR UR: YELLOW
GLUCOSE (UA): NEGATIVE
HGB UR QL STRIP: ABNORMAL
KETONES UR QL STRIP: NEGATIVE
LEUKOCYTE ESTERASE UR QL STRIP: ABNORMAL
NITRITE UR QL STRIP: NEGATIVE
PH UR STRIP: 6.5 [PH] (ref 5–9)
PROT UR QL STRIP: ABNORMAL
RBC #/AREA URNS HPF: 25 /HPF (ref 0–2)
SP GR UR STRIP: 1.01 (ref 1–1.03)
SQUAMOUS EPITHELIAL, UA: ABNORMAL /HPF (ref 0–4)
UROBILINOGEN UR STRIP-ACNC: 1
WBC #/AREA URNS HPF: 318 /HPF (ref 0–3)

## 2021-09-16 ENCOUNTER — HISTORICAL (OUTPATIENT)
Dept: ADMINISTRATIVE | Facility: HOSPITAL | Age: 36
End: 2021-09-16

## 2021-09-16 LAB
APPEARANCE, UA: ABNORMAL
BACTERIA SPEC CULT: ABNORMAL /HPF
BILIRUB UR QL STRIP: NEGATIVE
COLOR UR: YELLOW
GLUCOSE (UA): NEGATIVE
HGB UR QL STRIP: ABNORMAL
KETONES UR QL STRIP: NEGATIVE
LEUKOCYTE ESTERASE UR QL STRIP: ABNORMAL
NITRITE UR QL STRIP: NEGATIVE
PH UR STRIP: 7 [PH] (ref 5–9)
PROT UR QL STRIP: NEGATIVE
RBC #/AREA URNS HPF: ABNORMAL /[HPF]
SP GR UR STRIP: 1.01 (ref 1–1.03)
SQUAMOUS EPITHELIAL, UA: ABNORMAL /HPF (ref 0–4)
UROBILINOGEN UR STRIP-ACNC: 1
WBC #/AREA URNS HPF: 56 /HPF (ref 0–3)

## 2021-12-10 ENCOUNTER — HISTORICAL (OUTPATIENT)
Dept: LAB | Facility: HOSPITAL | Age: 36
End: 2021-12-10

## 2022-04-07 ENCOUNTER — HISTORICAL (OUTPATIENT)
Dept: ADMINISTRATIVE | Facility: HOSPITAL | Age: 37
End: 2022-04-07
Payer: MEDICAID

## 2022-04-24 VITALS
OXYGEN SATURATION: 95 % | BODY MASS INDEX: 28.6 KG/M2 | SYSTOLIC BLOOD PRESSURE: 150 MMHG | DIASTOLIC BLOOD PRESSURE: 92 MMHG | WEIGHT: 167.56 LBS | HEIGHT: 64 IN

## 2022-04-28 NOTE — ED PROVIDER NOTES
Patient:   Anuj Caldwell            MRN: [id number]            FIN: [id number]               Age:   35 years     Sex:  Male     :  1985   Associated Diagnoses:   None   Author:   Bear Bhat MD      Patient was seen apparently by Psychiatry this morning. No note is present at this time. Awaiting Psych's recommendations. Patient has no complaints. He is not suicidal at this time. His exam is unchanged from yesterday. Heart RRR, Chest clear,abd soft, nontender, extremities nontender, no swelling, neuro baseline weakness in lower extremities. Skin no breakdown noted. Will disposition based on Psychiatry recommendations.

## 2022-04-28 NOTE — ED PROVIDER NOTES
Patient:   Anuj Caldwell            MRN: [id number]            FIN: [id number]               Age:   35 years     Sex:  Male     :  1985   Associated Diagnoses:   None   Author:   Home ROSE, Bear Solis      7504: Patient continues to await Psychiatric placement. A CEC has been placed to my knowledge.  Has no complaints today.  He is aware that he may continue to remain in the ED for some time as placement will be a continued difficulty. Exam is within normal limits, baseline with slurred speech, good upper motor and sensory and 3/5 strength in lower extremities with sensation intact. Continue efforts at placement.
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
## 2022-04-28 NOTE — ED PROVIDER NOTES
Patient:   Anuj Caldwell            MRN: [id number]            FIN: [id number]               Age:   35 years     Sex:  Male     :  1985   Associated Diagnoses:   None   Author:   Bear Bhat MD      Patient complained tonight of left sided chest pain described as sharp, left sided chest pain x 1 hour with occasional chest pressure. Patient reports no associated dyspnea, n/v diaphoresis.    On exam, Heart RRR, Lungs clear  Abdomen soft,nontender  Extremities no edema  Neuro exam: Baseline as previously described    EKG at : SR at 120bpm, normal axis and intervals, (-) ST-T changes, nonspecific Twave changes in inferior leads  CXR: No acute pathology    A troponin returned with level: 0.118    A/P:  Chest pain/tachycardia - elevated troponin - Aspirin PO given. Tylenol with codeine for pain. Patient reports he cannot take nitroglycerin based on his cardiologist advice.   Case discussed with medicine for further evaluation.

## 2022-04-28 NOTE — H&P
"   Patient:   Anuj Caldwell            MRN: [id number]            FIN: [id number]               Age:   35 years     Sex:  Male     :  1985   Associated Diagnoses:   None   Author:   Radhika Trent MD      Basic Information   Source of history:  Self.    Referral source:  Emergency department.    History limitation:  None.       Chief Complaint   SI and had a gun and fired it.     chest pain      History of Present Illness   This is a 35-year-old male with a past medical history of depression, anxiety, seizures, spinal muscular dystrophy, dilated cardiomyopathy, bipolar, Jose L's ataxia, hypertension and MS who presented to the ED about one week ago with  initial complaints of suicidal ideations. Per EMR he reported that he had been taking testosterone tablets and had not been acting like himself. The OPC had reported that he wanted to be a serial killer and that he had fired his firearm twice. The police came and an OPC was placed on the patient via the caregiver. He has been waiting in [facility] ED for psychiatric placement for past week.   Today patient began to complain of chest pain to his left pectoralis muscle.  Patient describes the chest pain as "sticking, compressing," nonradiating with palpitations.  He denies nausea, vomiting, sweating, shortness of breath, fever, prior infection, chills or abdominal pain.  He currently denies suicidal ideations.   FM was consult for atypical chest pain and HTN.    Of note, he had been initially transferred to [facility] and was to be seen by Dr. Lino in [place] however he was brought back to [facility] because the patient felt  that they did not have proper wheelchair accommodations at the facility.  A behavior consult via telehealth was administered which recommended getting collateral information from his primary care doctor, mother, neighbor and work of his character, past behaviors and concerns. Due to poor documentation in the chart per " regarding impulsive behaviors. If he is not a danger to himself or others, step down intensive outpatient program was recommended if inpatient placement could not be found.     Allergies: amoxicillin  Family history: High blood pressure  mother  Social Hx: quit 67 years ago smoked one cigarette per day for 26 years, social drinker, illicit drug:  marijuana  surgical history: Ablation of atrioventricular node    ED course: Bp 141/101, , 93 % RA , troponin 0.118 (initial troponin 0.041), TSH 0.52, acetaminophen lesson 17.4, salicylate < 5, UDS positive cannabis, urine culture negative, chest x-ray with no acute thoracic abnormality. Patient was given aspirin 325 mg dose       Review of Systems   Constitutional:  No fever, No chills, No sweats, No fatigue.    Eye:  No icterus, No blurring, No double vision.    Ear/Nose/Mouth/Throat:  Negative, No decreased hearing, No ear pain, No nasal congestion.    Respiratory:  No shortness of breath, No cough, No sputum production, No hemoptysis, No wheezing.    Cardiovascular:  Palpitations, No bradycardia, No tachycardia, No peripheral edema.         Chest pain: Left sided, Anterior, pectorisl muscle.    Gastrointestinal:  No nausea, No vomiting, No diarrhea, No constipation.    Genitourinary:  No dysuria, No hematuria.    Hematology/Lymphatics:  No bruising tendency, No bleeding tendency.    Endocrine:  No excessive thirst, No polyuria, No cold intolerance.    Immunologic:  No recurrent fevers, No recurrent infections.    Musculoskeletal:  paralysis of BLE, able to move toes minimally, No joint pain, No muscle pain.    Integumentary:  No rash, No abrasions, No skin lesion.    Neurologic:  Alert and oriented X4.    Psychiatric:  No anxiety, No depression.    ROS reviewed as documented in chart      Health Status   Allergies:    Allergic Reactions (Selected)  Severity Not Documented  Amoxicillin- No reactions were documented.,    Allergies (1)  Active Reaction  amoxicillin None Documented     Current medications:  (Selected)   Prescriptions  Prescribed  JI Transfer Board: JI Transfer Board, See Instructions, JI Transfer board with increased . 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, reduced mobility Z74.09, cardiomyopathy I42.9., # 1 units, 0 Refill(s)  Flexeril 5 mg oral tablet: 5 mg = 1 tab(s), Oral, TID, PRN PRN spasm, # 60 tab(s), 0 Refill(s), Pharmacy: [facility], 170, cm, Height/Length Dosing, 02/11/21 14:34:00 CST, 96.1, kg, Weight Dosing, 02/04/20 14:55:00 CST  Nebulizer with mask and tubing: Nebulizer with mask and tubing, See Instructions, Nebulizer with mask and tubing  Use with albuterol breathing treatments  Disp: 1 each  Refills: 0, # 1 EA, 0 Refill(s)  Occupational Therapy: Occupational Therapy, See Instructions, Evaluate  and Treat  Shyam ataxia, # 1 EA, 0 Refill(s)  Physical therapy: Physical therapy, See Instructions, Physical therapy  Evaluate and treat  Shyam ataxia, # 1 EA, 0 Refill(s)  Tempurpedic Bed: Tempurpedic Bed, See Instructions, Bed with adjustable headboard and adjustable footboard. 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, reduced mobility Z74.09, cardiomyopathy I42.9., # 1 units, 0 Refill(s)  Wheelchair evaluation: Wheelchair evaluation, See Instructions, Wheelchair evaluation  Shyam ataxia, # 1 EA, 0 Refill(s)  Zofran 4 mg oral tablet: 4 mg = 1 tab(s), Oral, q8hr, PRN PRN as needed for nausea/vomiting, # 15 tab(s), 0 Refill(s), Pharmacy: [facility], 170, cm, Height/Length Dosing, 02/04/20 14:55:00 CST, 96.1, kg, Weight Dosing, 02/04/20 14:55:00 CST  Zofran 8 mg oral tablet: 8 mg = 1 tab(s), Oral, q8hr, PRN PRN as needed for nausea/vomiting, # 21 tab(s), 0 Refill(s), Pharmacy: [place]  albuterol 2.5 mg/3 mL (0.083%) inhalation solution: See Instructions, nebulize contents of 1 vial EVERY 4 HOURS AS NEEDED wheezing, # 180 mL, 6 Refill(s),  Pharmacy: [facility], 170, cm, Height/Length Dosing, 02/11/21 14:34:00 CST, 96.1, kg, Weight Dosing, 02/04/20 14:55:00 CST  buPROPion 150 mg oral tablet, extended release: 2 tablets, Oral, Daily, # 180 tab(s), 3 Refill(s), Pharmacy: [facility], 170, cm, Height/Length Dosing, 02/11/21 14:34:00 CST, 96.1, kg, Weight Dosing, 02/04/20 14:55:00 CST  diltiazem 240 mg/24 hours oral TABlet, extended release: 240 mg, Oral, Daily, # 90 tab(s), 3 Refill(s), Pharmacy: [facility]- [place]  fluticasone 50 mcg/inh nasal spray: See Instructions, use 2 sprays IN EACH NOSTRIL ONCE DAILY, # 48 gm, [facility] [place]  meloxicam 7.5 mg oral tablet: See Instructions, TAKE 1 TABLET BY MOUTH ONCE DAILY, # 30 tab(s), 0 Refill(s), Pharmacy: [facility], 170, cm, Height/Length Dosing, 02/04/20 14:55:00 CST, 96.1, kg, Weight Dosing, 02/04/20 14:55:00 CST  omeprazole 40 mg oral DR capsule: 80 mg = 2 cap(s), Oral, Daily, # 60 cap(s), 9 Refill(s), Pharmacy: [facility] [place]  polyethylene glycol 3350 oral powder for reconstitution: See Instructions, MIX 17 GRAMS IN 8 OUNCES OF WATER/JUICE AND DRINK ONCE DAILY., # 527 gm, 3 Refill(s), [facility] [place]  sildenafil 20 mg oral tablet: 20 mg = 1 tab(s), Oral, TID, # 90 tab(s), 1 Refill(s), Pharmacy: [facility], 170, cm, Height/Length Dosing, 02/11/21 14:34:00 CST, 96.1, kg, Weight Dosing, 02/04/20 14:55:00 CST  sucralfate 1 g oral tablet: 1 gm = 1 tab(s), Oral, QID, # 120 tab(s), 1 Refill(s), Pharmacy: [place], [place]  transfer board and transfer bath seat: transfer board and transfer bath seat, See Instructions, bath seat  dx. Fredrich's ataxia G11.1  dx. 334, # 1 units, 0 Refill(s),    Medications (14) Active  Scheduled: (8)  buPROPion 150 mg SR  150 mg 1 tab(s), Oral, Daily  diltiazem 120 mg/24 hours CD  UD  240 mg 2 cap(s), Oral, BID  [facility] melatonin 3 mg Tab  6 mg 2 tab(s), Oral, At Bedtime  losartan 50 mg Tab UD   50 mg 1 tab(s), Oral, Daily  metoprolol tart. 25 mg Tab UD  25 mg 1 tab(s), Oral, BID  OXcarbazepine 300 mg Tab UD  600 mg 2 tab(s), Oral, BID  pantoprazole 40 mg EC Tab  80 mg 2 tab(s), Oral, Daily  sucralfate 1 gm tablet  1 gm 1 tab(s), Oral, QID  Continuous: (1)  sodium chloride 0.9% 1,000 mL  1,000 mL, IV, 1,000 mL/hr  PRN: (5)  Al hydr/Mg hydr/sim(MAALOX+REG STR) 30 mL  [id number]/5 ml Hoa  30 mL, Oral, q4hr  albuterol 0.083% Sol 3 mL UD  2.5 mg 3 mL, NEB, q6hr Resp  cyclobenzaprine 10 mg Tab UD  10 mg 1 tab(s), Oral, Daily  hydrOXYzine pamoate 25 mg Cap UD  25 mg 1 cap(s), Oral, q6hr  ondansetron 4 mg ODT Tab  4 mg 1 tab(s), Oral, q6hr     Problem list.   Physical Examination   General:  Alert and oriented.    Eye:  Pupils are equal, round and reactive to light, Extraocular movements are intact.    HENT:  Tympanic membranes are clear, Normal hearing, Oral mucosa is moist, No pharyngeal erythema.    Neck:  Supple, Non-tender, No jugular venous distention.    Respiratory:  Lungs are clear to auscultation, Respirations are non-labored, Breath sounds are equal.    Cardiovascular:  Normal rate, Regular rhythm, No murmur, No gallop, Good pulses equal in all extremities, Normal peripheral perfusion.    Gastrointestinal:  Soft, Non-tender, Non-distended, Normal bowel sounds.    Genitourinary:  No costovertebral angle tenderness.    Lymphatics:  No lymphadenopathy neck, axilla, groin.    Musculoskeletal:  parlysis BLE , mucular atrophy noted BLE.    Integumentary:  Warm, Dry.    Neurologic:  Alert, Oriented, Normal sensory.    Cognition and Speech:  Speech clear and coherent.    Psychiatric:  Appropriate mood & affect.       Review / Management   Laboratory Results   Today's Lab Results : PowerNote Discrete Results   2/25/2021 2:50 CST       U pH                      6.5                             U Amph Scr                Negative                             U Elizabeth Scr                Negative                              U Benzodia Scr            Negative                             U Cannab Scr              Negative                             U Cocaine Scr             Negative                             U Opiate Scr              Negative                             U Phencyclidine Scr       Negative    2/25/2021 1:49 CST       Est Creat Clearance Ser   84.53 mL/min    2/25/2021 1:00 CST       Troponin-I                0.188 ng/mL  HI    2/24/2021 22:20 CST      WBC                       8.8 x10(3)/mcL                             RBC                       5.74 x10(6)/mcL                             Hgb                       16.9 gm/dL                             Hct                       49.7 %                             Platelet                  272 x10(3)/mcL                             MCV                       86.6 fL                             MCH                       29.4 pg                             MCHC                      34.0 gm/dL                             RDW                       13.7 %                             MPV                       9.9 fL                             Abs Neut                  3.88 x10(3)/mcL                             Neutro Auto               44 %  NA                             Lymph Auto                36 %  NA                             Mono Auto                 13 %  NA                             Eos Auto                  6 %  NA                             Abs Eos                   0.5 x10(3)/mcL                             Basophil Auto             1 %  NA                             Abs Neutro                3.88 x10(3)/mcL                             Abs Lymph                 3.2 x10(3)/mcL                             Abs Mono                  1.1 x10(3)/mcL                             Abs Baso                  0.1 x10(3)/mcL                             IG%                       0 %  NA                             IG#                       0.020  NA                              PT                        13.1 second(s)                             INR                       1.02                             PTT                       28.7 second(s)                             Sodium Lvl                136 mmol/L                             Potassium Lvl             3.8 mmol/L                             Chloride                  101 mmol/L                             CO2                       23 mmol/L                             Calcium Lvl               9.0 mg/dL                             Glucose Lvl               121 mg/dL  HI                             BUN                       12.2 mg/dL                             Creatinine                1.18 mg/dL                             eGFR-AA                   91                             eGFR-TRAVIS                  75 mL/min/1.73 m2  LOW                             Bili Total                0.4 mg/dL                             Bili Direct               0.2 mg/dL                             Bili Indirect             0.20 mg/dL                             AST                       25 unit/L                             ALT                       59 unit/L  HI                             Alk Phos                  125 unit/L                             Total Protein             7.2 gm/dL                             Albumin Lvl               4.0 gm/dL                             Globulin                  3.2 gm/dL                             A/G Ratio                 1.2 ratio                             BNP                       <10.0 pg/mL    2/24/2021 21:00 CST      Troponin-I                0.118 ng/mL  HI              Radiology results   Rad Results (ST)   Accession: [id number]  Order: XR Chest 1 View  Report Dt/Tm: 02/24/2021 21:12  Report:   EXAMINATION  XR Chest 1 View     INDICATION  Chest Pain     Comparison: 13 March, 2018     FINDINGS  Lines/tubes/devices:  None present.     The cardiomediastinal silhouette and central  pulmonary vasculature are  unremarkable for AP projection.  The trachea is midline. There is no lobar consolidation or significant  pleural effusion. No definite pneumothorax is appreciated.     There is no acute osseous or extrathoracic abnormality.     IMPRESSION  No acute thoracic abnormality.          Impression and Plan     1. Atypical chest pain      NSTEMI    - Admit troponin 0.04 now increased to 0.118. EKG normal sinus rhythm without st segment or t-wave changes.  - Pt describes the chest pain as sticking, compressing, nonradiating with palpitations  - Trending EKGs and troponin every six hours  - Ordered heparin drip per nomogram  - Order nitroglycerin 0.4 mg PRN  - Ordered Echo in am, Cards consult      2. Suicidal ideations  - Currently denies suicidal ideations but will continue to monitor for suicidal ideations/homicidal ideations and the need for one-to-one supervision  - continue bupropion 150 mg daily  - Consult CM to evaluate home environment, safety and outside resources that may be needed by the patient .     3. Paraplegic  - paralysis to both lower extremities bilateral muscular atrophy  - continued home medications cyclobenzaprine 10 mg daily, polyethylene glycol 17 gm  - Continue PT/OT for increased strengthening    4. Hypertension      Dilated cardiomyopathy      Hx Atrial fibrillation s/p ablation  -Continue diltiazem 240 mg daily, Losartan 50 mg daily, metoprolol 25 mg b.i.d.  - repeat TTE today  - currently NSR on EKG, distant hx of Afib    5. Seizures  -Continue oxcarbazepine 600 mg BID     PPX: Protonix 80 mg daily    Disposition: Admit to telemetry for atypical chest pains. Will continue the heparin gtt per nomogram. Will continue to monitor for possible SI/HI and the need for possible one-on-one supervision. Will trend the  troponins and EKG every six hours. Continue PT/ OT for increase strengthening. Inpatient psychiatric facility pending.  CODE STATUS: FULL     Radhika Colmenares  MD [employer], PGY-1  [facility]

## 2022-04-28 NOTE — ED PROVIDER NOTES
"   Patient:   Anuj Caldwell            MRN: [id number]            FIN: [id number]               Age:   35 years     Sex:  Male     :  1985   Associated Diagnoses:   None   Author:   Juan R Barbour MD      Basic Information   Addendum: Time of addendum:: 2021 13:39:00 .      Medical Decision Making   Documents reviewed:  Emergency department records.   Results review:     Labs (Last four charted values)  WBC                  10.6 ()   Hgb                  H 18.0 ()   Hct                  H 53.4 ()   Plt                  304 ()   Na                   138 ()   K                    4.0 ()   CO2                  23 ()   Cl                   101 ()   Cr                   1.09 ()   BUN                  12.0 ()   Glucose Random       87 () .   Notes:  Patient was accepted in transfer to [facility] in [place], but apparently when he arrived he threatened to file a lawsuit with the facility because he felt like the beds and handicap access available there was not adequate for his medical condition.  As a result of this, the psych facility had him immediately transferred back here.    Patient asserts to me that he is never been suicidal.  He asserts that he did not discharge his firearm, but that this was done by one of his aids.  He states that she did this and and added for to discredit him.  He states that there was a sexual relationship between them, but they began to have a falling out.    I discussed with patient's mother who has no opinion about whether or not he may be suicidal, but states he is "smart and manipulative".  Despite reassurance, she remains concerned.    Patient requests to be released, and states he is more than happy to give up his firearm.  Patient's OPC states that other caregivers can verify story and OPC.  There is only one telephone number on the OPC, and it is the caregiver that filled that out.  " A message is left at this number.    Seems to be more behavioral than suicidal ideation, and patient, being bedbound, seems low risk of suicide.  However, patient's mother seems uneasy with him being released, so we are doing a telehealth psychiatric consult which is presently requested.    Patient states he is comfortable physically.    1640 telehealth psychiatric consultation recommends collecting additional information from patient's mother and caregivers.  This has already been done.  Basically says to continue to attempt inpatient placement, and consider intensive stepdown outpatient psychiatric care if available.  It is unlikely that this will be available until Monday, considering it is now Friday afternoon..      Reexamination/ Reevaluation   Vital signs   Basic Oxygen Information   2/19/2021 10:38 CST      SpO2                      98 %                             Oxygen Therapy            Room air    2/19/2021 4:32 CST       SpO2                      99 %                             Oxygen Therapy            Room air    2/19/2021 0:00 CST       SpO2                      97 %                             Oxygen Therapy            Room air    2/18/2021 20:00 CST      SpO2                      98 %    2/18/2021 16:00 CST      SpO2                      95 %    2/18/2021 14:15 CST      SpO2                      94 %    2/18/2021 12:00 CST      SpO2                      100 %                             Oxygen Therapy            Room air    2/18/2021 8:48 CST       SpO2                      100 %                             Oxygen Therapy            Room air    2/18/2021 7:00 CST       SpO2                      99 %    2/18/2021 3:00 CST       SpO2                      94 %                             Oxygen Therapy            Room air    2/18/2021 2:15 CST       SpO2                      100 %                             Oxygen Therapy            Room air    2/18/2021 2:00 CST       SpO2                      96  %                             Oxygen Therapy            Room air    2/18/2021 1:00 CST       SpO2                      95 %                             Oxygen Therapy            Room air    2/18/2021 0:00 CST       SpO2                      95 %                             Oxygen Therapy            Room air

## 2022-05-01 NOTE — HISTORICAL [facility] CERNER
This is a historical note converted from Cerqi. Formatting and pictures may have been removed.  Please reference Cerner for original formatting and attached multimedia. 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  Immunizations  No immunizations recorded for this visit.  Admission Information  Admission HPI: 35-year-old male with extensive past medical history of depression/anxiety, seizures, spinal muscular dystrophy, dilated cardiomyopathy, bipolar disorder, Friedreichs ataxia, hypertension who initially presented to the ED 1 week ago for OPC for suicidal ideation. ?He has been at [facility] ED pending psychiatric placement for 1 week. ?Apparently experienced episode of chest pain on 2/24 while lying in bed. ?Present to left chest wall, sharp and pressure-like. ?No associated symptoms of shortness of breath, nausea, diaphoresis. ?ED physician did order troponin at that time which was slightly elevated to 0.118 without significant EKG changes. ?FM was consulted for admission for continued work-up. ?Patient has no other complaints at this time and currently denies any thoughts of harming self and states he was brought in via OPC after misunderstanding.  Hospital Course  Initially presented with?concern for suicidal/homicidal ideations?and was subsequently PEC and later  CEC?since 2/17/21.? While awaiting placement for psychiatric?issue he?experienced chest pain on 2/24/21.?Cardiology consulted. Trended EKGs and troponins x3; no significant changes to EKGs?and?troponins trended down.?Recommended Lexiscan however, patient declined and states he will get?on an outpatient basis with his cardiologist at [facility], Dr. Kahn.? CEC?rescinded?since patient not currently suicidal, homicidal or gravely disabled.? Case management worked with patients mother?to assist with?moving him back to his mothers home for care.? Hemodynamically stable for discharge home today.? Mother already has?home health services in place and prepared for patient to return.? PCP working on obtaining?left for patient.? Prescription sent for?Vistaril for as needed anxiety at patients request.  Time Spent on discharge  >25 minutes  Objective  Vitals & Measurements  T:?36.5? ?C (Oral)? TMIN:?36.5? ?C (Oral)? TMAX:?37? ?C (Oral)? HR:?75(Peripheral)? RR:?19? BP:?143/88? SpO2:?95%?  Physical Exam  General: ?No acute distress. ?  HENT: ?Oral mucosa is moist. ?  Respiratory: ?Lungs are clear to auscultation, Respirations are non-labored, Breath sounds are equal, Symmetrical chest wall expansion, No chest wall tenderness. ?  Cardiovascular: ?Normal rate, Regular rhythm, No edema. ?  Gastrointestinal: ?Soft, Non-tender, Non-distended.?Bowel sounds: Present. ?  Musculoskeletal: ?uses wheelchair at baseline.?Muscle tone: Bilateral, Lower extremity. ?  Neurologic: ?Alert, Oriented, speech slow and slightly slurred at baseline but understandable. ?  Psychiatric: ?Cooperative flat  Patient Discharge Condition  Stable and improved  Discharge Disposition  Home with Mother   Discharge Medication Reconciliation  Prescribed  hydrOXYzine (Vistaril 25 mg oral capsule)?25 mg, Oral, TID, PRN as needed for anxiety  Continue  Misc Prescription (JI Transfer Board)?See Instructions  Misc Prescription (Nebulizer with mask and tubing)?See  Instructions  Misc Prescription (Occupational Therapy)?See Instructions  Misc Prescription (Physical therapy)?See Instructions  Misc Prescription (Tempurpedic Bed)?See Instructions  Misc Prescription (Wheelchair evaluation)?See Instructions  Misc Prescription (transfer board and transfer bath seat)?See Instructions  OXcarbazepine (oxcarbazepine 600 mg oral tablet)?See Instructions  albuterol (albuterol 2.5 mg/3 mL (0.083%) inhalation solution)?See Instructions  buPROPion (buPROPion 150 mg oral tablet, extended release)?2 tablets, Oral, Daily  cyclobenzaprine (Flexeril 5 mg oral tablet)?5 mg, Oral, TID, PRN spasm  diltiazem (diltiazem 240 mg/24 hours oral TABlet, extended release)?240 mg, Oral, Daily  fluticasone nasal (fluticasone 50 mcg/inh nasal spray)?See Instructions  losartan (Losartan Potassium 50 Mg Tab)?50 mg, Oral, Daily  meloxicam (meloxicam 7.5 mg oral tablet)?See Instructions  metoprolol (Metoprolol Tartrat 25 Mg Tab)?25 mg, Oral, BID  multivitamin (Multiple Vitamins oral tablet)  omeprazole (omeprazole 40 mg oral DR capsule)?80 mg, Oral, Daily  ondansetron (Zofran 4 mg oral tablet)?4 mg, Oral, q8hr, PRN as needed for nausea/vomiting  ondansetron (Zofran 8 mg oral tablet)?8 mg, Oral, q8hr, PRN as needed for nausea/vomiting  polyethylene glycol 3350 (polyethylene glycol 3350 oral powder for reconstitution)?See Instructions  sildenafil (sildenafil 20 mg oral tablet)?20 mg, Oral, TID  sucralfate (sucralfate 1 g oral tablet)?1 gm, Oral, QID  Education and Orders Provided  Generalized Anxiety Disorder, Adult  Living With Anxiety  Discharge - 03/01/21 9:36:00 CST, Home, home to [facility] care?  Follow up  Report to Emergency Department if symptoms return or worsen  [facility], within 1 to 2 weeks,?Office will call w/follow up appointment      Patient seen and examined on day of discharge.? He will be going to live with his mom?and she is arranging for?sitters.? Weapons will be taken out of the  home.? Time spent on discharge 35 minutes.

## 2022-05-01 NOTE — HISTORICAL [facility] CERNER
This is a historical note converted from Myron. Formatting and pictures may have been removed.  Please reference Myron for original formatting and attached multimedia. Chief Complaint  refill of medication  History of Present Illness  34 Years- old?Male?[place] [facility] [place].  ?   Acute Issues:?none. ?Patient recently underwent laparoscopic cholecystectomy.? Doing well since surgery.  ?  Chronic Issues:  ?  Shyam ataxia  -Takes [place] 10 twice daily for pain  --Has been on pain medicine for the last 3 to 4 years  -Minimal scoliosis with Reed angle of 9 degrees  -David referral to [place] placed 4/4/2018-attended appointment states he does not need surgery  -[facility] home health helps patient with daily needs and is usually there 24 hours a day  -Dependent in all ADLs and unable to transfer, wheelchair bound  -Is able to take oral medication  ?  Muscle spasms  -Flexeril 10 mg 3 times daily as needed  ?  Depression/anxiety  -On Wellbutrin 150 mg daily and Klonopin 2 mg twice daily  -Has been on Klonopin for approximately 3 years  ?  Epilepsy  -Well-controlled on Trileptal 600 mg twice daily  -No recent seizure activity in years  ?  A. fib status post ablation  -On Cardizem 240 mg daily and metoprolol tartrate 25 twice daily  -Following with cardiology  ?  Cardiomyopathy  -Follows with [facility]  ?  Constipation  -Compliant with MiraLAX daily  -Well-controlled  ?  GERD/hiatal hernia  -Well-controlled with omeprazole 40 mg and Carafate 1 g 4 times daily  ?  Nausea/vomiting  -Controlled with Zofran 8 mg every 8 hours as needed  ?  Hypertension  -Losartan 50 mg daily  -Well-controlled  ?  DENISSE  -Compliant with CPAP nightly  ?  ED  -Viagra 20 mg as needed  -Does not take nitrates for chest pain  Review of Systems  Constitutional:?no fevers, chills or fatigue  Eye:?no change in vision  ENMT:?+ Seasonal allergies  Respiratory:?no trouble breathing or shortness of breath  Cardiovascular:?no chest pain or  tightness,?no shortness breath on activity,?no ankle swelling  Gastrointestinal:no constipation,?no diarrhea,?no nausea,?no vomiting  Musculoskeletal:?No muscle pain,?chronic back pain  Integumentary: no rashes or breakdown  Neurologic: no dizziness, no fainting  Physical Exam  Vitals & Measurements  T:?36.2? ?C (Oral)? HR:?86(Peripheral)?  HT:?170.18?cm? WT:?85.9?kg? BMI:?29.66?  General:?Alert and oriented,?no acute distress  HEENT:?TMs clear bilaterally,?Oropharynx clear without any signs of erythema?,?No palpable lymphadenopathy  Respiratory:?Lungs clear to auscultation bilaterally,?No increased work of breathing  Cardiovascular:?S1-S2, regular rate, regular rhythm,?2+ radial pulses,?No lower extremity edema  Gastrointestinal:?Soft, nontender, nondistended;?laparoscopic?incisions?healing well  Musculoskeletal: Decreased?strength bilaterally in upper and lower extremities. ?Sitting in wheelchair with?chest wrap.  Psych: Calm, cooperative  Assessment/Plan  1.?Friedreich ataxia(  Confirmed  )?G11.1  ?Physical therapy and Occupational Therapy prescription as below  Ordered:  Misc Prescription, Physical therapy, See Instructions, Physical therapy Evaluate and treat Shyam ataxia, # 1 EA, 0 Refill(s)  Misc Prescription, Occupational Therapy, See Instructions, Evaluate and Treat Shyam ataxia, # 1 EA, 0 Refill(s)  Clinic Follow up, *Est. 02/09/20 3:00:00 CST, Order for future visit, Friedreich ataxia(  Confirmed  )  Epilepsy(  Confirmed  )  Atrial fibrillation(  Confirmed  )  Anxiety(  Confirmed  )  Depressive disorder(  Confirmed  )  Muscle spasm  Obesity  Prim...  ?  2.?Epilepsy(  Confirmed  )?G40.909  ?Continue to take Trileptal  Patient has been on Wellbutrin for years with no?seizure activity  Ordered:  Clinic Follow up, *Est. 02/09/20 3:00:00 CST, Order for future visit, Friedreich ataxia(  Confirmed  )  Epilepsy(  Confirmed  )  Atrial fibrillation(  Confirmed  )  Anxiety(   Confirmed  )  Depressive disorder(  Confirmed  )  Muscle spasm  Obesity  Prim...  ?  3.?Atrial fibrillation(  Confirmed  )?I48.91  ?Continue to follow with?cardiology  Ordered:  Clinic Follow up, *Est. 02/09/20 3:00:00 CST, Order for future visit, Friedreich ataxia(  Confirmed  )  Epilepsy(  Confirmed  )  Atrial fibrillation(  Confirmed  )  Anxiety(  Confirmed  )  Depressive disorder(  Confirmed  )  Muscle spasm  Obesity  Prim...  ?  4.?Anxiety(  Confirmed  )?F41.9  ?Klonopin?being weaned from 2 mg twice daily  We will start with Klonopin 1 mg twice daily for 1 month then?Klonopin 0.5 mg twice daily for another month  Patient?needs appointment with [facility]  Ordered:  Clinic Follow up, *Est. 02/09/20 3:00:00 CST, Order for future visit, Friedreich ataxia(  Confirmed  )  Epilepsy(  Confirmed  )  Atrial fibrillation(  Confirmed  )  Anxiety(  Confirmed  )  Depressive disorder(  Confirmed  )  Muscle spasm  Obesity  Prim...  ?  5.?Depressive disorder(  Confirmed  )?F32.9  ?Same as #4  Ordered:  Clinic Follow up, *Est. 02/09/20 3:00:00 CST, Order for future visit, Friedreich ataxia(  Confirmed  )  Epilepsy(  Confirmed  )  Atrial fibrillation(  Confirmed  )  Anxiety(  Confirmed  )  Depressive disorder(  Confirmed  )  Muscle spasm  Obesity  Prim...  ?  6.?Muscle spasm?M62.838  ?Well resolved with Flexeril  Ordered:  Clinic Follow up, *Est. 02/09/20 3:00:00 CST, Order for future visit, Friedreich ataxia(  Confirmed  )  Epilepsy(  Confirmed  )  Atrial fibrillation(  Confirmed  )  Anxiety(  Confirmed  )  Depressive disorder(  Confirmed  )  Muscle spasm  Obesity  Prim...  ?  7.?Obesity?E66.9  ?Weight stable  Ordered:  Clinic Follow up, *Est. 02/09/20 3:00:00 CST, Order for future visit, Friedreich ataxia(  Confirmed  )  Epilepsy(  Confirmed  )  Atrial fibrillation(  Confirmed  )  Anxiety(  Confirmed  )  Depressive disorder(   Confirmed  )  Muscle spasm  Obesity  Prim...  ?  8.?Primary cardiomyopathy(  Confirmed  )?I42.9  ?Cardiology following  Ordered:  Clinic Follow up, *Est. 02/09/20 3:00:00 CST, Order for future visit, Friedreich ataxia(  Confirmed  )  Epilepsy(  Confirmed  )  Atrial fibrillation(  Confirmed  )  Anxiety(  Confirmed  )  Depressive disorder(  Confirmed  )  Muscle spasm  Obesity  Prim...  ?  9.?Controlled substance agreement signed?Z79.899  ?Weaning?benzos as above  Continuing Norco  Ordered:  Clinic Follow up, *Est. 02/09/20 3:00:00 CST, Order for future visit, Friedreich ataxia(  Confirmed  )  Epilepsy(  Confirmed  )  Atrial fibrillation(  Confirmed  )  Anxiety(  Confirmed  )  Depressive disorder(  Confirmed  )  Muscle spasm  Obesity  Prim...  Drug Screen Urine [facility], Routine collect, Urine, Order for future visit, 12/09/19 15:50:00 CST, Stop date 12/09/19 15:50:00 CST, Nurse collect, Controlled substance agreement signed, 12/09/19 15:50:00 CST  ?  Orders:  acetaminophen-HYDROcodone, 1 tab(s), Oral, BID, Quantity medically necessary BLAS#[id number], # 60 tab(s), 0 Refill(s), Pharmacy: [place], [place], Earliest Fill Date 12/09/19  acetaminophen-HYDROcodone, 1 tab(s), Oral, BID, Quantity medically necessary [id number]#[id number] Do not fill until 1/9/2020, # 60 tab(s), 0 Refill(s), Pharmacy: [place], [place], Earliest Fill Date 01/09/20  clonazePAM, 0.5 mg = 1 tab(s), Oral, BID, # 60 tab(s), 0 Refill(s), Pharmacy: [place], [place]  clonazePAM, 1 mg = 1 tab(s), Oral, BID, # 60 tab(s), 0 Refill(s), Pharmacy: [place], [place], Patient Education Provided  cyclobenzaprine, See Instructions, TAKE ONE TABLET BY MOUTH THREE TIMES DAILY, # 90 tab(s), 1 Refill(s), Pharmacy: [place], [place]  CBC w/ Auto Diff, Routine collect, *Est. 12/09/19 3:00:00 CST, Blood, Order for future visit, *Est. Stop date 12/09/19 3:00:00 CST,  Lab Collect, HTN (hypertension), 12/09/19 15:53:00 CST  Comprehensive Metabolic Panel, Routine collect, *Est. 12/09/19 3:00:00 CST, Blood, Order for future visit, *Est. Stop date 12/09/19 3:00:00 CST, Lab Collect, HTN (hypertension), 12/09/19 15:52:00 CST  Free T4, Routine collect, *Est. 12/09/19 3:00:00 CST, Blood, Order for future visit, *Est. Stop date 12/09/19 3:00:00 CST, Lab Collect, HTN (hypertension), 12/09/19 15:53:00 CST  Hemoglobin A1C [facility], Routine collect, *Est. 12/09/19 3:00:00 CST, Blood, Order for future visit, *Est. Stop date 12/09/19 3:00:00 CST, Lab Collect, HTN (hypertension), 12/09/19 15:53:00 CST  Lipid Panel, Routine collect, *Est. 12/09/19 3:00:00 CST, Blood, Order for future visit, *Est. Stop date 12/09/19 3:00:00 CST, Lab Collect, HTN (hypertension), 12/09/19 15:52:00 CST  Microalbum/Creatinine Ratio Urine (Microalb/Creat), Routine collect, Urine, Order for future visit, *Est. 12/09/19 3:00:00 CST, *Est. Stop date 12/09/19 3:00:00 CST, Nurse collect, HTN (hypertension)  Thyroid Stimulating Hormone, Routine collect, *Est. 12/09/19 3:00:00 CST, Blood, Order for future visit, *Est. Stop date 12/09/19 3:00:00 CST, Lab Collect, HTN (hypertension), 12/09/19 15:52:00 CST  Vitamin B12 Level, Routine collect, *Est. 12/09/19 3:00:00 CST, Blood, Order for future visit, *Est. Stop date 12/09/19 3:00:00 CST, Lab Collect, HTN (hypertension), 12/09/19 15:52:00 CST  Vitamin D, 25-Hydroxy Level, Routine collect, *Est. 12/09/19 3:00:00 CST, Blood, Order for future visit, *Est. Stop date 12/09/19 3:00:00 CST, Lab Collect, HTN (hypertension), 12/09/19 15:52:00 CST  Referrals  Clinic Follow up, *Est. 02/09/20 3:00:00 CST, Order for future visit, Friedreich ataxia(  Confirmed  )  Epilepsy(  Confirmed  )  Atrial fibrillation(  Confirmed  )  Anxiety(  Confirmed  )  Depressive disorder(  Confirmed  )  Muscle spasm  Obesity  Prim...   Problem List/Past Medical History  Ongoing  Anxiety(  Confirmed   )  Atrial fibrillation(  Confirmed  )  Depressive disorder(  Confirmed  )  Epilepsy(  Confirmed  )  Friedreich ataxia(  Confirmed  )  History of TMJ disorder(  Confirmed  )  Muscle spasm  Obesity  Primary cardiomyopathy(  Confirmed  )  Historical  Muscular dystrophy  Procedure/Surgical History  Ablation of atrioventricular node   Medications  JI Transfer Board, See Instructions  albuterol 2.5 mg/3 mL (0.083%) inhalation solution, 2.5 mg= 3 mL, INH, q4hr, PRN, 6 refills  buPROPion 150 mg oral tablet, extended release, 2 tablets, Oral, Daily, 3 refills  clonazePAM 0.5 mg oral tablet, 0.5 mg= 1 tab(s), Oral, BID  clonazePAM 1 mg oral tablet, 1 mg= 1 tab(s), Oral, BID  cyclobenzaprine 10 mg oral tablet, See Instructions, 1 refills  diltiazem 240 mg/24 hours oral TABlet, extended release, 240 mg, Oral, Daily, 3 refills  DilTIAZem Hydrochloride  mg/24 hours oral capsule, extended release, See Instructions, 3 refills  fluticasone 50 mcg/inh nasal spray, See Instructions  Losartan Potassium 50 Mg Tab, 50 mg= 1 tab(s), Oral, Daily  Metoprolol Tartrat 25 Mg Tab, 25 mg= 1 tab(s), Oral, BID  Multiple Vitamins oral tablet  Nebulizer with mask and tubing, See Instructions  Norco 10 mg-325 mg oral tablet, 1 tab(s), Oral, BID  Norco 10 mg-325 mg oral tablet, 1 tab(s), Oral, BID  Occupational Therapy, See Instructions  omeprazole 40 mg oral DR capsule, 80 mg= 2 cap(s), Oral, Daily, 9 refills  oxcarbazepine 600 mg oral tablet, See Instructions, 5 refills  Physical therapy, See Instructions  polyethylene glycol 3350 oral powder for reconstitution, See Instructions, 3 refills  sildenafil 20 mg oral tablet, 20 mg= 1 tab(s), Oral, TID, 1 refills  sucralfate 1 g oral tablet, 1 gm= 1 tab(s), Oral, QID, 1 refills  Tempurpedic Bed, See Instructions  transfer board and transfer bath seat, See Instructions  Zofran 8 mg oral tablet, 8 mg= 1 tab(s), Oral, q8hr, PRN  Allergies  amoxicillin  Social History  Abuse/Neglect  No,  No, Yes, 08/22/2019  Alcohol - Denies Alcohol Use, 04/07/2015  Past, Liquor, 1-2 times per year, Previous treatment: None., 05/16/2019  Employment/School  Unemployed, 09/27/2018  Exercise  Self assessment: Poor condition., 12/10/2018  Home/Environment  Lives with Alone. Living situation: Home/Independent., 09/27/2018  Nutrition/Health  Regular, Wants to lose weight: Yes. Sleeping concerns: No. Feels highly stressed: No., 12/10/2018  Sexual  Sexually active: Yes. Sexual orientation: Straight or heterosexual. Uses condoms: No. History of sexual abuse: No. Gender Identity Identifies as male., 02/22/2019  Substance Use  Never, 07/31/2018  Tobacco  Never (less than 100 in lifetime), No, Household tobacco concerns: No. Smokeless Tobacco Use: Never., 08/22/2019  Family History  High blood pressure 09-AUG-2016 15:25:48<$>: Mother.  Immunizations  Vaccine Date Status   influenza virus vaccine, inactivated 12/09/2019 Given   influenza virus vaccine, inactivated 09/27/2018 Given   pneumococcal 13-valent conjugate vaccine 04/04/2018 Given   influenza virus vaccine, inactivated 09/19/2017 Given   influenza virus vaccine, inactivated 10/19/2016 Given   influenza virus vaccine, inactivated 10/07/2015 Given   Health Maintenance  Health Maintenance  ???Pending?(in the next year)  ??? ??OverDue  ??? ? ? ?Diabetes Screening due??and every?  ??? ? ? ?Influenza Vaccine due??and every?  ??? ? ? ?Smoking Cessation due??01/01/19??and every 1??year(s)  ??? ? ? ?Hypertension Management-BMP due??07/27/19??and every 1??year(s)  ??? ??Due?  ??? ? ? ?Tetanus Vaccine due??12/09/19??and every 10??year(s)  ??? ??Due In Future?  ??? ? ? ?ADL Screening not due until??12/10/19??and every 1??year(s)  ??? ? ? ?Alcohol Misuse Screening not due until??01/01/20??and every 1??year(s)  ??? ? ? ?Obesity Screening not due until??01/01/20??and every 1??year(s)  ??? ? ? ?Blood Pressure Screening not due until??08/21/20??and every 1??year(s)  ??? ? ?  ?Hypertension Management-Blood Pressure not due until??08/21/20??and every 1??year(s)  ??? ? ? ?Body Mass Index Check not due until??12/08/20??and every 1??year(s)  ???Satisfied?(in the past 1 year)  ??? ??Satisfied?  ??? ? ? ?ADL Screening on??12/10/18.??Satisfied by Piper De La Cruz LPN  ??? ? ? ?Alcohol Misuse Screening on??02/22/19.??Satisfied by Piper De La Cruz LPN  ??? ? ? ?Blood Pressure Screening on??08/22/19.??Satisfied by Tanika Davies  ??? ? ? ?Body Mass Index Check on??12/09/19.??Satisfied by Lamar Zhong  ??? ? ? ?Depression Screening on??12/09/19.??Satisfied by Lamar Zhong  ??? ? ? ?Hypertension Management-Blood Pressure on??08/22/19.??Satisfied by Tanika Davies  ??? ? ? ?Influenza Vaccine on??12/09/19.??Satisfied by Lamar Zhong  ??? ? ? ?Obesity Screening on??12/09/19.??Satisfied by Lamar Zhong  ??? ? ? ?Smoking Cessation on??12/10/18.??Satisfied by Piper De La Cruz LPN  ?      ?  I reviewed History, PE, A/P and chart was reviewed.  I agree with resident, care reasonable and appropriate.?  management discussed

## 2022-05-05 ENCOUNTER — TELEPHONE (OUTPATIENT)
Dept: FAMILY MEDICINE | Facility: CLINIC | Age: 37
End: 2022-05-05
Payer: MEDICAID

## 2022-05-25 ENCOUNTER — TELEPHONE (OUTPATIENT)
Dept: FAMILY MEDICINE | Facility: CLINIC | Age: 37
End: 2022-05-25
Payer: MEDICAID

## 2022-06-02 ENCOUNTER — LAB REQUISITION (OUTPATIENT)
Dept: LAB | Facility: HOSPITAL | Age: 37
End: 2022-06-02
Payer: MEDICAID

## 2022-06-02 DIAGNOSIS — N31.9 NEUROMUSCULAR DYSFUNCTION OF BLADDER, UNSPECIFIED: ICD-10-CM

## 2022-06-02 LAB
APPEARANCE UR: ABNORMAL
BACTERIA #/AREA URNS AUTO: ABNORMAL /HPF
BILIRUB UR QL STRIP.AUTO: ABNORMAL MG/DL
COLOR UR AUTO: ABNORMAL
GLUCOSE UR QL STRIP.AUTO: NEGATIVE MG/DL
KETONES UR QL STRIP.AUTO: NEGATIVE MG/DL
LEUKOCYTE ESTERASE UR QL STRIP.AUTO: ABNORMAL UNIT/L
NITRITE UR QL STRIP.AUTO: POSITIVE
PH UR STRIP.AUTO: 7 [PH]
PROT UR QL STRIP.AUTO: ABNORMAL MG/DL
RBC #/AREA URNS AUTO: 15 /HPF
RBC UR QL AUTO: ABNORMAL UNIT/L
SP GR UR STRIP.AUTO: 1.01 (ref 1–1.03)
SQUAMOUS #/AREA URNS AUTO: 10 /LPF
UROBILINOGEN UR STRIP-ACNC: 1 MG/DL
WBC #/AREA URNS AUTO: 417 /HPF

## 2022-06-02 PROCEDURE — 87077 CULTURE AEROBIC IDENTIFY: CPT | Performed by: UROLOGY

## 2022-06-02 PROCEDURE — 81001 URINALYSIS AUTO W/SCOPE: CPT | Performed by: UROLOGY

## 2022-06-08 RX ORDER — NITROFURANTOIN 25; 75 MG/1; MG/1
100 CAPSULE ORAL 2 TIMES DAILY
Qty: 14 CAPSULE | Refills: 0 | Status: SHIPPED | OUTPATIENT
Start: 2022-06-08 | End: 2022-06-15

## 2022-06-12 LAB
BACTERIA UR CULT: ABNORMAL
BACTERIA UR CULT: ABNORMAL

## 2022-08-01 DIAGNOSIS — R13.14 DYSPHAGIA, PHARYNGOESOPHAGEAL PHASE: Primary | ICD-10-CM

## 2022-08-19 ENCOUNTER — OFFICE VISIT (OUTPATIENT)
Dept: FAMILY MEDICINE | Facility: CLINIC | Age: 37
End: 2022-08-19
Payer: MEDICAID

## 2022-08-19 VITALS
BODY MASS INDEX: 29.69 KG/M2 | HEIGHT: 63 IN | TEMPERATURE: 98 F | DIASTOLIC BLOOD PRESSURE: 76 MMHG | WEIGHT: 167.56 LBS | HEART RATE: 65 BPM | SYSTOLIC BLOOD PRESSURE: 113 MMHG | RESPIRATION RATE: 16 BRPM | OXYGEN SATURATION: 96 %

## 2022-08-19 DIAGNOSIS — F32.89 OTHER DEPRESSION: ICD-10-CM

## 2022-08-19 DIAGNOSIS — G11.11 FRIEDREICH'S ATAXIA: Primary | ICD-10-CM

## 2022-08-19 DIAGNOSIS — K29.70 GASTRITIS, PRESENCE OF BLEEDING UNSPECIFIED, UNSPECIFIED CHRONICITY, UNSPECIFIED GASTRITIS TYPE: ICD-10-CM

## 2022-08-19 PROCEDURE — 99215 OFFICE O/P EST HI 40 MIN: CPT | Mod: PBBFAC

## 2022-08-19 RX ORDER — DOCUSATE SODIUM 100 MG/1
100 CAPSULE, LIQUID FILLED ORAL 2 TIMES DAILY
COMMUNITY

## 2022-08-19 RX ORDER — CYCLOBENZAPRINE HCL 5 MG
5 TABLET ORAL 3 TIMES DAILY PRN
Qty: 90 TABLET | Refills: 0 | Status: SHIPPED | OUTPATIENT
Start: 2022-08-19 | End: 2022-09-18

## 2022-08-19 RX ORDER — ONDANSETRON 8 MG/1
8 TABLET, ORALLY DISINTEGRATING ORAL 2 TIMES DAILY
COMMUNITY

## 2022-08-19 RX ORDER — CYCLOBENZAPRINE HCL 5 MG
5 TABLET ORAL 3 TIMES DAILY PRN
COMMUNITY
End: 2022-08-19 | Stop reason: SDUPTHER

## 2022-08-19 RX ORDER — PANTOPRAZOLE SODIUM 40 MG/1
40 TABLET, DELAYED RELEASE ORAL DAILY
COMMUNITY
End: 2022-08-19 | Stop reason: SDUPTHER

## 2022-08-19 RX ORDER — FOLIC ACID 1 MG/1
1 TABLET ORAL DAILY
COMMUNITY

## 2022-08-19 RX ORDER — HYDROXYZINE PAMOATE 25 MG/1
25 CAPSULE ORAL 2 TIMES DAILY PRN
COMMUNITY

## 2022-08-19 RX ORDER — NITROFURANTOIN (MACROCRYSTALS) 100 MG/1
100 CAPSULE ORAL DAILY
COMMUNITY
End: 2022-10-10 | Stop reason: ALTCHOICE

## 2022-08-19 RX ORDER — LOSARTAN POTASSIUM 50 MG/1
50 TABLET ORAL DAILY
COMMUNITY

## 2022-08-19 RX ORDER — PANTOPRAZOLE SODIUM 40 MG/1
40 TABLET, DELAYED RELEASE ORAL DAILY
Qty: 30 TABLET | Refills: 3 | Status: SHIPPED | OUTPATIENT
Start: 2022-08-19 | End: 2023-06-06 | Stop reason: SDUPTHER

## 2022-08-19 NOTE — PROGRESS NOTES
"Chief Complaint  Medication Refill      History of Present Illness  Anuj Caldwell is a 36 y.o. year old male with PMH of Friedreich's Ataxia presents to the clinic accompanied by his mother and caretaker for routine visit. There are no acute complaints. Mom is requesting documentation to be filled out so  can remain eligible for continued home care. In addition she is also requesting a suction device she can use to help with excess salivary secretions. Mom states Mr. Caldwell used to have choking episodes on his own secretions due to having "strictures". There was a recent endoscopy done, where his esophagus had to be dilated, he was found to have a hiatal hernia, gastritis and stricture. No recent UTIs, no other complaints at this visit.     Review of Systems  ROS-as noted in HPI    Physical Exam  Physical Exam  Vitals reviewed.   Constitutional:       Appearance: He is not ill-appearing.      Comments: In wheelchair  Shetty bag visible with yellow urine   HENT:      Head: Normocephalic.      Mouth/Throat:      Mouth: Mucous membranes are moist.   Cardiovascular:      Rate and Rhythm: Normal rate and regular rhythm.   Pulmonary:      Breath sounds: Normal breath sounds.      Comments: Clear to auscultation, however effort is poor due to non-compliance  Abdominal:      Palpations: Abdomen is soft.      Tenderness: There is no abdominal tenderness.   Neurological:      Mental Status: He is alert.      Comments: Full ROM of BUE with 5/5 strength  ROM of BLE is limited, however pt flexed the hips in a seated position on demand         Vitals:    08/19/22 1045   BP: 113/76   Pulse: 65   Resp: 16   Temp: 97.7 °F (36.5 °C)     Wt Readings from Last 2 Encounters:   08/19/22 76 kg (167 lb 8.8 oz)   12/23/21 76 kg (167 lb 8.8 oz)         Current Outpatient Medications  Outpatient Medications as of 8/19/2022   Medication Sig Dispense Refill    aspirin (ECOTRIN) 81 MG EC tablet Take 1 tablet (81 mg total) by mouth once " daily.      diltiaZEM (DILACOR XR) 240 MG CDCR Take 1 capsule (240 mg total) by mouth once daily. 60 capsule 11    fluticasone (FLONASE) 50 mcg/actuation nasal spray 1 spray by Each Nare route 2 (two) times daily.       folic acid (FOLVITE) 1 MG tablet Take 1 mg by mouth once daily.      losartan (COZAAR) 50 MG tablet Take 50 mg by mouth once daily.      metoprolol succinate (TOPROL-XL) 25 MG 24 hr tablet Take 100 mg by mouth once daily at 6am.      oxcarbazepine (TRILEPTAL) 600 MG Tab Take 600 mg by mouth 2 (two) times daily.      pantoprazole (PROTONIX) 40 MG tablet Take 1 tablet (40 mg total) by mouth once daily. 30 tablet 3     No current facility-administered medications on file as of 8/19/2022.             Assessment / Plan:    Friedreich's ataxia  -     Stable, continue current medications  - Documentation filled out in room with patient regarding extension of home care    Other depression  -sees Dr. Membreno at [place]  -stable, continue psychiatric medicines as prescribed  -most recent psychiatric hospitalization last year  -no SI or HI    Gastritis  GERD  - will need documentation from Mr. Caldwell's mother regarding the endoscopy findings, I contacted her and she'll have the facility fax it over  -once proper documentation is received, will order suction device  -for now, continue with Protonix 40 daily as previously prescribed and avoidance of food with high acid content  -per chart review, pt is to undergo a barium swallow study, order pending            Follow up:    In 6 months, or earlier if needed.     Noemi Martin M.D.  [facility] PGY-2

## 2022-08-21 NOTE — PROGRESS NOTES
I reviewed History, PE, A/P and chart was reviewed.  Services provided in a teaching facility, I was immediately available  I agree with resident, care reasonable and appropriate.   Management discussed with resident at time of visit.    CBC 3/2022 hg 7.2, CMP 11/2021 sodium 129, LDL, (130 Alex)  Needs FU labs    Is on Macrodantin 100 mg prophylaxis     Follows [facility]    Referral placed today for [facility]

## 2022-08-25 ENCOUNTER — OFFICE VISIT (OUTPATIENT)
Dept: UROLOGY | Facility: CLINIC | Age: 37
End: 2022-08-25
Payer: MEDICAID

## 2022-08-25 VITALS
BODY MASS INDEX: 29.59 KG/M2 | HEIGHT: 63 IN | HEART RATE: 64 BPM | OXYGEN SATURATION: 97 % | DIASTOLIC BLOOD PRESSURE: 85 MMHG | SYSTOLIC BLOOD PRESSURE: 131 MMHG | RESPIRATION RATE: 20 BRPM | TEMPERATURE: 98 F | WEIGHT: 167 LBS

## 2022-08-25 DIAGNOSIS — N31.9 NEUROGENIC BLADDER: ICD-10-CM

## 2022-08-25 PROCEDURE — 99215 OFFICE O/P EST HI 40 MIN: CPT | Mod: PBBFAC | Performed by: NURSE PRACTITIONER

## 2022-08-25 PROCEDURE — 1160F RVW MEDS BY RX/DR IN RCRD: CPT | Mod: CPTII,,, | Performed by: NURSE PRACTITIONER

## 2022-08-25 PROCEDURE — 3008F BODY MASS INDEX DOCD: CPT | Mod: CPTII,,, | Performed by: NURSE PRACTITIONER

## 2022-08-25 PROCEDURE — 99213 OFFICE O/P EST LOW 20 MIN: CPT | Mod: S$PBB,,, | Performed by: NURSE PRACTITIONER

## 2022-08-25 PROCEDURE — 1160F PR REVIEW ALL MEDS BY PRESCRIBER/CLIN PHARMACIST DOCUMENTED: ICD-10-PCS | Mod: CPTII,,, | Performed by: NURSE PRACTITIONER

## 2022-08-25 PROCEDURE — 3079F DIAST BP 80-89 MM HG: CPT | Mod: CPTII,,, | Performed by: NURSE PRACTITIONER

## 2022-08-25 PROCEDURE — 3008F PR BODY MASS INDEX (BMI) DOCUMENTED: ICD-10-PCS | Mod: CPTII,,, | Performed by: NURSE PRACTITIONER

## 2022-08-25 PROCEDURE — 4010F PR ACE/ARB THEARPY RXD/TAKEN: ICD-10-PCS | Mod: CPTII,,, | Performed by: NURSE PRACTITIONER

## 2022-08-25 PROCEDURE — 3079F PR MOST RECENT DIASTOLIC BLOOD PRESSURE 80-89 MM HG: ICD-10-PCS | Mod: CPTII,,, | Performed by: NURSE PRACTITIONER

## 2022-08-25 PROCEDURE — 1159F PR MEDICATION LIST DOCUMENTED IN MEDICAL RECORD: ICD-10-PCS | Mod: CPTII,,, | Performed by: NURSE PRACTITIONER

## 2022-08-25 PROCEDURE — 4010F ACE/ARB THERAPY RXD/TAKEN: CPT | Mod: CPTII,,, | Performed by: NURSE PRACTITIONER

## 2022-08-25 PROCEDURE — 3075F PR MOST RECENT SYSTOLIC BLOOD PRESS GE 130-139MM HG: ICD-10-PCS | Mod: CPTII,,, | Performed by: NURSE PRACTITIONER

## 2022-08-25 PROCEDURE — 99213 PR OFFICE/OUTPT VISIT, EST, LEVL III, 20-29 MIN: ICD-10-PCS | Mod: S$PBB,,, | Performed by: NURSE PRACTITIONER

## 2022-08-25 PROCEDURE — 1159F MED LIST DOCD IN RCRD: CPT | Mod: CPTII,,, | Performed by: NURSE PRACTITIONER

## 2022-08-25 PROCEDURE — 3075F SYST BP GE 130 - 139MM HG: CPT | Mod: CPTII,,, | Performed by: NURSE PRACTITIONER

## 2022-08-25 NOTE — PROGRESS NOTES
Chief Complaint:   Chief Complaint   Patient presents with    6 month followup Neurogenic bladder     Treated for uti 1 month ago         HPI:  Patient is a 36-year-old male 6 month follow-up for neurogenic bladder.  On last visit Dr. Rider stressed Continue monthly catheter exchanges. Order sent to home health to manually irrigate the cath with each exchange. I stressed the importance of increasing his water intake. I also discussed that he will have chronic bacterial colonization in the urine because of the presence of an indwelling catheter. I stressed the importance of only treating bacteriuria when he is symptomatic (fever, gross hematuria, urinary sediment, pericatheter leakage, worsening bladder spasms).  UTI one month ago patient was treated for symptomatic UTI, with bacterial culture and sensitivity UA.  Today patient denies dysuria, Shetty catheter in place, denies gross hematuria.     Allergies:  Review of patient's allergies indicates:   Allergen Reactions    Amoxicillin Other (See Comments)     Pt states does not know reaction (was a baby at that time)       Medications:  Current Outpatient Medications   Medication Sig Dispense Refill    aspirin (ECOTRIN) 81 MG EC tablet Take 1 tablet (81 mg total) by mouth once daily.      brexpiprazole (REXULTI) 2 mg Tab Take 2 mg by mouth.      cyclobenzaprine (FLEXERIL) 5 MG tablet Take 1 tablet (5 mg total) by mouth 3 (three) times daily as needed. 90 tablet 0    diltiaZEM (DILACOR XR) 240 MG CDCR Take 1 capsule (240 mg total) by mouth once daily. 60 capsule 11    docusate sodium (COLACE) 100 MG capsule Take 100 mg by mouth 2 (two) times daily.      fluticasone (FLONASE) 50 mcg/actuation nasal spray 1 spray by Each Nare route 2 (two) times daily.       folic acid (FOLVITE) 1 MG tablet Take 1 mg by mouth once daily.      hydrOXYzine pamoate (VISTARIL) 25 MG Cap Take 25 mg by mouth 2 (two) times daily as needed.      losartan (COZAAR) 50 MG tablet Take 50  mg by mouth once daily.      metoprolol succinate (TOPROL-XL) 25 MG 24 hr tablet Take 100 mg by mouth once daily at 6am.      nitrofurantoin (MACRODANTIN) 100 MG capsule Take 100 mg by mouth once daily at 6am.      ondansetron (ZOFRAN-ODT) 8 MG TbDL Take 8 mg by mouth 2 (two) times daily.      oxcarbazepine (TRILEPTAL) 600 MG Tab Take 600 mg by mouth 2 (two) times daily.      pantoprazole (PROTONIX) 40 MG tablet Take 1 tablet (40 mg total) by mouth once daily. 30 tablet 3    vortioxetine (TRINTELLIX) 20 mg Tab Take 20 mg by mouth.       No current facility-administered medications for this visit.       Review of Systems:  General: No fever, chills, fatigability, or weight loss.  Skin: No rashes, itching, or changes in color or texture of skin.  Chest: Denies MCDONALD, cyanosis, wheezing, cough, and sputum production.  Abdomen: Appetite fine. No weight loss. Denies diarrhea, abdominal pain, hematemesis, or blood in stool.  Musculoskeletal: No joint stiffness or swelling. Denies back pain.  : As above.  All other review of systems negative.    PMH:  Past Medical History:   Diagnosis Date    2007       PSH:  Past Surgical History:   Procedure Laterality Date    WISDOM TOOTH EXTRACTION         FamHx:  Family History   Problem Relation Age of Onset    Hypertension Mother     Arrhythmia Father     Heart attack Father     Hypertension Father     Arrhythmia Sister     Heart attack Maternal Grandmother     Stroke Maternal Grandmother     Heart attack Maternal Grandfather        SocHx:  Social History     Socioeconomic History    Marital status: Single   Tobacco Use    Smoking status: Former Smoker     Packs/day: 0.25     Years: 10.00     Pack years: 2.50     Start date: 1/15/2010     Quit date: 3/25/2014     Years since quittin.4    Smokeless tobacco: Never Used   Substance and Sexual Activity    Alcohol use: No    Drug use: No       Physical Exam:  Vitals:    22 0929   BP: 131/85    Pulse: 64   Resp: 20   Temp: 98.4 °F (36.9 °C)     General: A&Ox3, no apparent distress, no deformities  Neck: No masses, normal thyroid  Lungs: CTA griselda, no use of accessory muscles  Heart: RRR, no arrhythmias  Abdomen: Soft, NT, ND, no masses, no hernias, no hepatosplenomegaly  Lymphatic: Neck and groin nodes negative  Skin: The skin is warm and dry. No jaundice.  Ext: No c/c/e.        Imaging:  None    Impression:  Neurogenic bladder    Plan: RTC 6 months. I stressed the importance of increasing his water intake. I also discussed that he will have chronic bacterial colonization in the urine because of the presence of an indwelling catheter. I stressed the importance of only treating bacteriuria when he is symptomatic (fever, gross hematuria, urinary sediment, pericatheter leakage, worsening bladder spasms).  Continue catheter changes as scheduled with home health.

## 2022-08-26 DIAGNOSIS — R13.14 DYSPHAGIA, PHARYNGOESOPHAGEAL PHASE: Primary | ICD-10-CM

## 2022-09-27 RX ORDER — MIRTAZAPINE 30 MG/1
30 TABLET, FILM COATED ORAL NIGHTLY
Qty: 30 TABLET | Refills: 0 | Status: SHIPPED | OUTPATIENT
Start: 2022-09-27

## 2022-09-27 RX ORDER — MIRTAZAPINE 30 MG/1
1 TABLET, FILM COATED ORAL NIGHTLY
COMMUNITY
Start: 2022-09-01 | End: 2022-09-27 | Stop reason: SDUPTHER

## 2022-09-30 ENCOUNTER — DOCUMENTATION ONLY (OUTPATIENT)
Dept: FAMILY MEDICINE | Facility: CLINIC | Age: 37
End: 2022-09-30
Payer: MEDICAID

## 2022-09-30 NOTE — PROGRESS NOTES
Supporting documentation    Anuj Caldwell is a 36 y.o. year old male with h/o Friredreich's Ataxia, muscular dystrophy, recurrent UTIs, GERD, gastritis and hiatal hernia. Pt is under my care at [facility].   Pt recently had a modified barium swallow study done as him and his mother was complaining of coughing and choking with oral intake of both solids and liquids. Swallow study demonstrated moderate oropharyngeal dysphagia and it was recommended patient use a suction to avoid risk of aspiration, reflux and/or choking.     Please see scanned document for more detailed discussion of findings.     As patient's primary care provider I recommend patient to receive a suction device.         Noemi Martin M.D.  [facility] PGY-2

## 2022-10-10 DIAGNOSIS — N31.9 NEUROGENIC BLADDER: Primary | ICD-10-CM

## 2022-10-10 RX ORDER — NITROFURANTOIN 25; 75 MG/1; MG/1
100 CAPSULE ORAL DAILY
Qty: 30 CAPSULE | Refills: 11 | Status: SHIPPED | OUTPATIENT
Start: 2022-10-10 | End: 2023-03-02

## 2022-10-10 RX ORDER — NITROFURANTOIN 25; 75 MG/1; MG/1
100 CAPSULE ORAL DAILY
Qty: 90 CAPSULE | Refills: 3 | Status: CANCELLED | OUTPATIENT
Start: 2022-10-10

## 2022-10-11 DIAGNOSIS — R27.0 ATAXIA: Primary | ICD-10-CM

## 2022-10-13 RX ORDER — OXCARBAZEPINE 600 MG/1
600 TABLET, FILM COATED ORAL 2 TIMES DAILY
Qty: 60 TABLET | Refills: 3 | Status: SHIPPED | OUTPATIENT
Start: 2022-10-13 | End: 2023-01-12 | Stop reason: SDUPTHER

## 2022-11-30 ENCOUNTER — OFFICE VISIT (OUTPATIENT)
Dept: URGENT CARE | Facility: CLINIC | Age: 37
End: 2022-11-30
Payer: MEDICAID

## 2022-11-30 VITALS
TEMPERATURE: 98 F | BODY MASS INDEX: 29.95 KG/M2 | HEART RATE: 56 BPM | SYSTOLIC BLOOD PRESSURE: 114 MMHG | DIASTOLIC BLOOD PRESSURE: 73 MMHG | OXYGEN SATURATION: 98 % | RESPIRATION RATE: 18 BRPM | WEIGHT: 169 LBS | HEIGHT: 63 IN

## 2022-11-30 DIAGNOSIS — J02.9 SORE THROAT: ICD-10-CM

## 2022-11-30 DIAGNOSIS — Z11.52 ENCOUNTER FOR SCREENING FOR COVID-19: Primary | ICD-10-CM

## 2022-11-30 DIAGNOSIS — J06.9 UPPER RESPIRATORY TRACT INFECTION, UNSPECIFIED TYPE: ICD-10-CM

## 2022-11-30 PROCEDURE — 99214 OFFICE O/P EST MOD 30 MIN: CPT | Mod: S$PBB,,, | Performed by: FAMILY MEDICINE

## 2022-11-30 PROCEDURE — 99214 PR OFFICE/OUTPT VISIT, EST, LEVL IV, 30-39 MIN: ICD-10-PCS | Mod: S$PBB,,, | Performed by: FAMILY MEDICINE

## 2022-11-30 PROCEDURE — 0241U COVID/RSV/FLU A&B PCR: CPT | Performed by: FAMILY MEDICINE

## 2022-11-30 PROCEDURE — 99214 OFFICE O/P EST MOD 30 MIN: CPT | Mod: PBBFAC | Performed by: FAMILY MEDICINE

## 2022-11-30 RX ORDER — PREDNISONE 20 MG/1
20 TABLET ORAL DAILY
Qty: 5 TABLET | Refills: 0 | Status: SHIPPED | OUTPATIENT
Start: 2022-11-30 | End: 2022-12-05

## 2022-11-30 RX ORDER — AZITHROMYCIN 250 MG/1
TABLET, FILM COATED ORAL
Qty: 6 TABLET | Refills: 0 | Status: SHIPPED | OUTPATIENT
Start: 2022-11-30 | End: 2022-12-05

## 2022-11-30 NOTE — PROGRESS NOTES
"Subjective:       Patient ID: Anuj Caldwell is a 37 y.o. male.    Chief Complaint: Cough (Productive, pt verbalized that he feels like he has water in his chest), Nasal Congestion, and Sore Throat      HPI  38yo male with productive cough, chest congestion, nasal congestion, and sore throat for 3 days.  Sister is hospitalized with pneumonia.  OTC medications ineffective.  Review of Systems   HENT:          As above   Respiratory:          As above       Objective:       Vital Signs  Temp: 98.2 °F (36.8 °C)  Temp src: Oral  Pulse: (!) 56  Resp: 18  SpO2: 98 %  BP: 114/73  Height and Weight  Height: 5' 2.99" (160 cm)  Weight: 76.7 kg (169 lb)  BSA (Calculated - sq m): 1.85 sq meters  BMI (Calculated): 29.9  Weight in (lb) to have BMI = 25: 140.8]  Physical Exam  Vitals reviewed.   Constitutional:       Appearance: Normal appearance.   HENT:      Head: Normocephalic and atraumatic.      Nose: Congestion present. No rhinorrhea.      Mouth/Throat:      Pharynx: Posterior oropharyngeal erythema present. No oropharyngeal exudate.   Eyes:      Conjunctiva/sclera: Conjunctivae normal.   Cardiovascular:      Rate and Rhythm: Normal rate and regular rhythm.      Heart sounds: Normal heart sounds.   Pulmonary:      Breath sounds: Rhonchi (bilateral lower fields) present. No wheezing.   Skin:     General: Skin is dry.   Neurological:      Mental Status: He is alert.   Psychiatric:         Mood and Affect: Mood normal.       Assessment:       Problem List Items Addressed This Visit    None  Visit Diagnoses       Encounter for screening for COVID-19    -  Primary    Relevant Orders    COVID/RSV/FLU A&B PCR    Sore throat        Relevant Orders    Strep Group A by PCR    Upper respiratory tract infection, unspecified type        Relevant Medications    azithromycin (Z-LUCERO) 250 MG tablet    predniSONE (DELTASONE) 20 MG tablet              Plan:   Awaiting swabs   Encouraged ibuprofen/acetaminophen as needed  ER precautions  Follow-up " with PCP

## 2022-12-01 ENCOUNTER — TELEPHONE (OUTPATIENT)
Dept: URGENT CARE | Facility: CLINIC | Age: 37
End: 2022-12-01
Payer: MEDICAID

## 2022-12-01 LAB
FLUAV AG UPPER RESP QL IA.RAPID: DETECTED
FLUBV AG UPPER RESP QL IA.RAPID: NOT DETECTED
RSV A 5' UTR RNA NPH QL NAA+PROBE: NOT DETECTED
SARS-COV-2 RNA RESP QL NAA+PROBE: NOT DETECTED

## 2022-12-01 NOTE — TELEPHONE ENCOUNTER
----- Message from CARMEN Jonas sent at 12/1/2022  9:34 AM CST -----  Please notify patient that he was positive for influenza A.  According to chart review, symptoms started 3 or 4 days ago.  If this is correct then we would be outside of the 48 hour window of starting Tamiflu.  At this point we would treat with over-the-counter medications for comfort.

## 2022-12-13 ENCOUNTER — TELEPHONE (OUTPATIENT)
Dept: FAMILY MEDICINE | Facility: CLINIC | Age: 37
End: 2022-12-13
Payer: MEDICAID

## 2022-12-13 RX ORDER — CYCLOBENZAPRINE HCL 10 MG
10 TABLET ORAL 3 TIMES DAILY PRN
Qty: 30 TABLET | Refills: 0 | Status: SHIPPED | OUTPATIENT
Start: 2022-12-13 | End: 2022-12-18

## 2022-12-13 NOTE — TELEPHONE ENCOUNTER
Pharmacy requesting refill on cyclobenzaprine. Rx not on current med list in Epic, but is listed in [employer]. Please send a new Rx if pt is to continue on this therapy, or advise if this Rx has been d/c. Thank you!

## 2022-12-13 NOTE — TELEPHONE ENCOUNTER
Spoke to pharmacy, cyclobenzaprine last filled on August 18 for 30 quantities,  new refill requested. Spoke to family, who stated that Anuj only takes this medicine as needed at night time and that a 30 day supply usually last for a couple of months.   Refill sent to pharmacy.     Noemi Martin M.D.  [facility] PGY-2

## 2023-01-12 DIAGNOSIS — R27.0 ATAXIA: ICD-10-CM

## 2023-01-13 RX ORDER — OXCARBAZEPINE 600 MG/1
600 TABLET, FILM COATED ORAL 2 TIMES DAILY
Qty: 60 TABLET | Refills: 3 | Status: SHIPPED | OUTPATIENT
Start: 2023-01-13 | End: 2023-05-23 | Stop reason: SDUPTHER

## 2023-03-02 ENCOUNTER — OFFICE VISIT (OUTPATIENT)
Dept: UROLOGY | Facility: CLINIC | Age: 38
End: 2023-03-02
Payer: MEDICAID

## 2023-03-02 VITALS
HEART RATE: 76 BPM | RESPIRATION RATE: 20 BRPM | OXYGEN SATURATION: 97 % | TEMPERATURE: 98 F | BODY MASS INDEX: 29.95 KG/M2 | DIASTOLIC BLOOD PRESSURE: 87 MMHG | HEIGHT: 63 IN | SYSTOLIC BLOOD PRESSURE: 137 MMHG | WEIGHT: 169 LBS

## 2023-03-02 DIAGNOSIS — N31.9 NEUROGENIC BLADDER: Primary | ICD-10-CM

## 2023-03-02 DIAGNOSIS — N20.0 KIDNEY STONES: ICD-10-CM

## 2023-03-02 PROCEDURE — 3008F BODY MASS INDEX DOCD: CPT | Mod: CPTII,,, | Performed by: NURSE PRACTITIONER

## 2023-03-02 PROCEDURE — 1160F PR REVIEW ALL MEDS BY PRESCRIBER/CLIN PHARMACIST DOCUMENTED: ICD-10-PCS | Mod: CPTII,,, | Performed by: NURSE PRACTITIONER

## 2023-03-02 PROCEDURE — 1159F PR MEDICATION LIST DOCUMENTED IN MEDICAL RECORD: ICD-10-PCS | Mod: CPTII,,, | Performed by: NURSE PRACTITIONER

## 2023-03-02 PROCEDURE — 4010F PR ACE/ARB THEARPY RXD/TAKEN: ICD-10-PCS | Mod: CPTII,,, | Performed by: NURSE PRACTITIONER

## 2023-03-02 PROCEDURE — 3079F DIAST BP 80-89 MM HG: CPT | Mod: CPTII,,, | Performed by: NURSE PRACTITIONER

## 2023-03-02 PROCEDURE — 3075F SYST BP GE 130 - 139MM HG: CPT | Mod: CPTII,,, | Performed by: NURSE PRACTITIONER

## 2023-03-02 PROCEDURE — 99215 OFFICE O/P EST HI 40 MIN: CPT | Mod: PBBFAC | Performed by: NURSE PRACTITIONER

## 2023-03-02 PROCEDURE — 3075F PR MOST RECENT SYSTOLIC BLOOD PRESS GE 130-139MM HG: ICD-10-PCS | Mod: CPTII,,, | Performed by: NURSE PRACTITIONER

## 2023-03-02 PROCEDURE — 1160F RVW MEDS BY RX/DR IN RCRD: CPT | Mod: CPTII,,, | Performed by: NURSE PRACTITIONER

## 2023-03-02 PROCEDURE — 99213 PR OFFICE/OUTPT VISIT, EST, LEVL III, 20-29 MIN: ICD-10-PCS | Mod: S$PBB,,, | Performed by: NURSE PRACTITIONER

## 2023-03-02 PROCEDURE — 1159F MED LIST DOCD IN RCRD: CPT | Mod: CPTII,,, | Performed by: NURSE PRACTITIONER

## 2023-03-02 PROCEDURE — 3008F PR BODY MASS INDEX (BMI) DOCUMENTED: ICD-10-PCS | Mod: CPTII,,, | Performed by: NURSE PRACTITIONER

## 2023-03-02 PROCEDURE — 99213 OFFICE O/P EST LOW 20 MIN: CPT | Mod: S$PBB,,, | Performed by: NURSE PRACTITIONER

## 2023-03-02 PROCEDURE — 3079F PR MOST RECENT DIASTOLIC BLOOD PRESSURE 80-89 MM HG: ICD-10-PCS | Mod: CPTII,,, | Performed by: NURSE PRACTITIONER

## 2023-03-02 PROCEDURE — 4010F ACE/ARB THERAPY RXD/TAKEN: CPT | Mod: CPTII,,, | Performed by: NURSE PRACTITIONER

## 2023-03-02 RX ORDER — NITROFURANTOIN MACROCRYSTALS 50 MG/1
50 CAPSULE ORAL EVERY 6 HOURS
Qty: 90 CAPSULE | Refills: 3 | Status: SHIPPED | OUTPATIENT
Start: 2023-03-02 | End: 2023-06-06

## 2023-03-02 NOTE — PROGRESS NOTES
Seen by ADRIÁN Monk NP. Supplies for stone collection given.  Renal Ultrasound ordered.  RTC 6 months.

## 2023-03-02 NOTE — PROGRESS NOTES
Chief Complaint: No chief complaint on file.      HPI: Patient is a 36-year-old male 6 month follow-up for neurogenic bladder.  Patient's last visit he was educated on persistent catheter changes monthly.  Patient also educated on only treating symptomatic UAs.  Patient is having intermittent episodes of stones in his Shetty catheter without gross hematuria.  Instructed caregiver to continue previously instructions with treating only symptomatic UAs.    Allergies:  Review of patient's allergies indicates:   Allergen Reactions    Amoxicillin Other (See Comments)     Pt states does not know reaction (was a baby at that time)       Medications:  Current Outpatient Medications   Medication Sig Dispense Refill    aspirin (ECOTRIN) 81 MG EC tablet Take 1 tablet (81 mg total) by mouth once daily.      brexpiprazole (REXULTI) 2 mg Tab Take 2 mg by mouth.      diltiaZEM (DILACOR XR) 240 MG CDCR Take 1 capsule (240 mg total) by mouth once daily. 60 capsule 11    docusate sodium (COLACE) 100 MG capsule Take 100 mg by mouth 2 (two) times daily.      fluticasone (FLONASE) 50 mcg/actuation nasal spray 1 spray by Each Nare route 2 (two) times daily.       folic acid (FOLVITE) 1 MG tablet Take 1 mg by mouth once daily.      hydrOXYzine pamoate (VISTARIL) 25 MG Cap Take 25 mg by mouth 2 (two) times daily as needed.      losartan (COZAAR) 50 MG tablet Take 50 mg by mouth once daily.      metoprolol succinate (TOPROL-XL) 25 MG 24 hr tablet Take 100 mg by mouth once daily at 6am.      mirtazapine (REMERON) 30 MG tablet Take 1 tablet (30 mg total) by mouth every evening. 30 tablet 0    nitrofurantoin, macrocrystal-monohydrate, (MACROBID) 100 MG capsule Take 1 capsule (100 mg total) by mouth once daily. 30 capsule 11    ondansetron (ZOFRAN-ODT) 8 MG TbDL Take 8 mg by mouth 2 (two) times daily.      OXcarbazepine (TRILEPTAL) 600 MG Tab Take 1 tablet (600 mg total) by mouth 2 (two) times daily. 60 tablet 3    pantoprazole (PROTONIX) 40 MG  tablet Take 1 tablet (40 mg total) by mouth once daily. 30 tablet 3    vortioxetine (TRINTELLIX) 20 mg Tab Take 20 mg by mouth.       No current facility-administered medications for this visit.       Review of Systems:  General: No fever, chills, fatigability, or weight loss.  Skin: No rashes, itching, or changes in color or texture of skin.  Chest: Denies MCDONALD, cyanosis, wheezing, cough, and sputum production.  Abdomen: Appetite fine. No weight loss. Denies diarrhea, abdominal pain, hematemesis, or blood in stool.  Musculoskeletal: No joint stiffness or swelling. Denies back pain.  : As above.  All other review of systems negative.    PMH:  Past Medical History:   Diagnosis Date    Seizures        PSH:  Past Surgical History:   Procedure Laterality Date    WISDOM TOOTH EXTRACTION         FamHx:  Family History   Problem Relation Age of Onset    Hypertension Mother     Arrhythmia Father     Heart attack Father     Hypertension Father     Arrhythmia Sister     Heart attack Maternal Grandmother     Stroke Maternal Grandmother     Heart attack Maternal Grandfather        SocHx:  Social History     Socioeconomic History    Marital status: Single   Tobacco Use    Smoking status: Former     Packs/day: 0.25     Years: 10.00     Pack years: 2.50     Types: Cigarettes     Start date: 1/15/2010     Quit date: 3/25/2014     Years since quittin.9    Smokeless tobacco: Never   Substance and Sexual Activity    Alcohol use: No    Drug use: No       Physical Exam:  There were no vitals filed for this visit.  General: A&Ox3, no apparent distress, no deformities  Neck: No masses, normal thyroid  Lungs: CTA griselda, no use of accessory muscles  Heart: RRR, no arrhythmias  Abdomen: Soft, NT, ND, no masses, no hernias, no hepatosplenomegaly  Lymphatic: Neck and groin nodes negative  Skin: The skin is warm and dry. No jaundice.  Ext: No c/c/e.          Impression:  Neurogenic bladder, recurrent UTI      Plan:  Instructed patient's  caregiver will send kidney stone for analysis and treat accordingly.  Instructed patient's caregiver for will get a renal ultrasound for assessment of hydronephrosis.  Instructed patient's caregiver to continue Macrobid for suppression therapy. RTC 6 months.  Instructed patient caregiver to strain all urine kidney stone.

## 2023-03-30 RX ORDER — CYCLOBENZAPRINE HCL 10 MG
10 TABLET ORAL 3 TIMES DAILY PRN
Qty: 30 TABLET | Refills: 1 | Status: SHIPPED | OUTPATIENT
Start: 2023-03-30 | End: 2023-04-09

## 2023-04-04 ENCOUNTER — HOSPITAL ENCOUNTER (OUTPATIENT)
Dept: RADIOLOGY | Facility: HOSPITAL | Age: 38
Discharge: HOME | End: 2023-04-04
Attending: NURSE PRACTITIONER
Payer: MEDICAID

## 2023-04-04 DIAGNOSIS — N31.9 NEUROGENIC BLADDER: ICD-10-CM

## 2023-04-04 PROCEDURE — 76775 US EXAM ABDO BACK WALL LIM: CPT | Mod: TC

## 2023-05-18 ENCOUNTER — OFFICE VISIT (OUTPATIENT)
Dept: URGENT CARE | Facility: CLINIC | Age: 38
End: 2023-05-18
Payer: MEDICAID

## 2023-05-18 VITALS
DIASTOLIC BLOOD PRESSURE: 92 MMHG | SYSTOLIC BLOOD PRESSURE: 142 MMHG | HEART RATE: 71 BPM | OXYGEN SATURATION: 97 % | TEMPERATURE: 99 F | RESPIRATION RATE: 20 BRPM

## 2023-05-18 DIAGNOSIS — R31.9 URINARY TRACT INFECTION WITH HEMATURIA, SITE UNSPECIFIED: Primary | ICD-10-CM

## 2023-05-18 DIAGNOSIS — R30.0 DYSURIA: ICD-10-CM

## 2023-05-18 DIAGNOSIS — N39.0 URINARY TRACT INFECTION WITH HEMATURIA, SITE UNSPECIFIED: Primary | ICD-10-CM

## 2023-05-18 DIAGNOSIS — R31.9 HEMATURIA, UNSPECIFIED TYPE: ICD-10-CM

## 2023-05-18 LAB
BILIRUB UR QL STRIP: NEGATIVE
GLUCOSE UR QL STRIP: NEGATIVE
KETONES UR QL STRIP: NEGATIVE
LEUKOCYTE ESTERASE UR QL STRIP: POSITIVE
PH, POC UA: 6
POC BLOOD, URINE: POSITIVE
POC NITRATES, URINE: NEGATIVE
PROT UR QL STRIP: POSITIVE
SP GR UR STRIP: 1.02 (ref 1–1.03)
UROBILINOGEN UR STRIP-ACNC: ABNORMAL (ref 0.3–2.2)

## 2023-05-18 PROCEDURE — 99213 OFFICE O/P EST LOW 20 MIN: CPT | Mod: S$PBB,,,

## 2023-05-18 PROCEDURE — 81003 URINALYSIS AUTO W/O SCOPE: CPT | Mod: PBBFAC

## 2023-05-18 PROCEDURE — 87088 URINE BACTERIA CULTURE: CPT

## 2023-05-18 PROCEDURE — 99213 PR OFFICE/OUTPT VISIT, EST, LEVL III, 20-29 MIN: ICD-10-PCS | Mod: S$PBB,,,

## 2023-05-18 PROCEDURE — 99215 OFFICE O/P EST HI 40 MIN: CPT | Mod: PBBFAC

## 2023-05-18 PROCEDURE — 87186 SC STD MICRODIL/AGAR DIL: CPT | Mod: 91

## 2023-05-18 RX ORDER — CYCLOBENZAPRINE HCL 10 MG
10 TABLET ORAL
COMMUNITY
Start: 2021-09-25

## 2023-05-18 RX ORDER — METOPROLOL TARTRATE 25 MG/1
50 TABLET, FILM COATED ORAL
COMMUNITY
Start: 2021-08-20 | End: 2023-06-06

## 2023-05-18 RX ORDER — CIPROFLOXACIN 500 MG/1
500 TABLET ORAL EVERY 12 HOURS
Qty: 14 TABLET | Refills: 0 | Status: SHIPPED | OUTPATIENT
Start: 2023-05-18 | End: 2023-06-06

## 2023-05-18 RX ORDER — PHENAZOPYRIDINE HYDROCHLORIDE 200 MG/1
200 TABLET, FILM COATED ORAL DAILY PRN
COMMUNITY
Start: 2022-01-14

## 2023-05-18 RX ORDER — PHENAZOPYRIDINE HYDROCHLORIDE 100 MG/1
100 TABLET, FILM COATED ORAL 3 TIMES DAILY PRN
Qty: 9 TABLET | Refills: 0 | Status: SHIPPED | OUTPATIENT
Start: 2023-05-18 | End: 2023-06-06

## 2023-05-18 NOTE — PROGRESS NOTES
Subjective:      Patient ID: Anuj Caldwell is a 37 y.o. male.    Vitals:  oral temperature is 98.5 °F (36.9 °C). His blood pressure is 142/92 (abnormal) and his pulse is 71. His respiration is 20 and oxygen saturation is 97%.     Chief Complaint: Hematuria    PT states hematuria, dysuria and sediment in urinary catheter. Pt has indwelling catheter due to neurogenic bladder.    Hematuria  Associated symptoms include dysuria.     Constitution: Negative.   HENT: Negative.     Neck: neck negative.   Cardiovascular: Negative.    Eyes: Negative.    Respiratory: Negative.     Gastrointestinal: Negative.    Genitourinary:  Positive for dysuria and hematuria.   Musculoskeletal: Negative.    Skin: Negative.    Neurological: Negative.     Objective:     Physical Exam   Constitutional: He is oriented to person, place, and time. normal  HENT:   Head: Normocephalic.   Nose: Nose normal.   Eyes: Pupils are equal, round, and reactive to light.   Cardiovascular: Normal rate, regular rhythm, normal heart sounds and normal pulses.   Pulmonary/Chest: Effort normal and breath sounds normal.   Abdominal: Soft. There is no left CVA tenderness and no right CVA tenderness.   Musculoskeletal: Normal range of motion.         General: Normal range of motion.   Neurological: He is alert and oriented to person, place, and time.   Skin: Skin is warm and dry.   Vitals reviewed.  Results for orders placed or performed in visit on 05/18/23   POCT Urinalysis, Dipstick, Automated, W/O Scope   Result Value Ref Range    POC Blood, Urine Positive (A) Negative    POC Bilirubin, Urine Negative Negative    POC Urobilinogen, Urine 0.2 E.U./dL 0.3 - 2.2    POC Ketones, Urine Negative Negative    POC Protein, Urine Positive (A) Negative    POC Nitrates, Urine Negative Negative    POC Glucose, Urine Negative Negative    pH, UA 6.0     POC Specific Gravity, Urine 1.025 1.003 - 1.029    POC Leukocytes, Urine Positive (A) Negative       Assessment:     1. Urinary  tract infection with hematuria, site unspecified    2. Hematuria, unspecified type    3. Dysuria        Plan:       Urinary tract infection with hematuria, site unspecified  -     Urine culture  -     ciprofloxacin HCl (CIPRO) 500 MG tablet; Take 1 tablet (500 mg total) by mouth every 12 (twelve) hours. for 7 days  Dispense: 14 tablet; Refill: 0    Hematuria, unspecified type  -     POCT Urinalysis, Dipstick, Automated, W/O Scope    Dysuria  -     phenazopyridine (PYRIDIUM) 100 MG tablet; Take 1 tablet (100 mg total) by mouth 3 (three) times daily as needed for Pain.  Dispense: 9 tablet; Refill: 0      Follow up with urologist and PCP.    ER precautions given, patient verbalized understanding.     Please see provided patient education for guidance.    Follow up with PCP or return to clinic if symptoms worsen or do not improve.

## 2023-05-22 LAB
BACTERIA UR CULT: ABNORMAL
BACTERIA UR CULT: ABNORMAL

## 2023-05-22 NOTE — PROGRESS NOTES
One bacteria is sensitive to cipro, the other is intermediate.   Continue cipro and follow up with PCP and urology.  If patient's symptoms have not improved or worsened, please bring to the ED for further evaluation and management.

## 2023-05-23 DIAGNOSIS — R27.0 ATAXIA: ICD-10-CM

## 2023-05-23 RX ORDER — OXCARBAZEPINE 600 MG/1
600 TABLET, FILM COATED ORAL 2 TIMES DAILY
Qty: 60 TABLET | Refills: 1 | Status: SHIPPED | OUTPATIENT
Start: 2023-05-23 | End: 2023-06-06

## 2023-06-06 ENCOUNTER — OFFICE VISIT (OUTPATIENT)
Dept: FAMILY MEDICINE | Facility: CLINIC | Age: 38
End: 2023-06-06
Payer: MEDICAID

## 2023-06-06 ENCOUNTER — LAB VISIT (OUTPATIENT)
Dept: LAB | Facility: HOSPITAL | Age: 38
End: 2023-06-06
Attending: STUDENT IN AN ORGANIZED HEALTH CARE EDUCATION/TRAINING PROGRAM
Payer: MEDICAID

## 2023-06-06 VITALS
HEIGHT: 62 IN | SYSTOLIC BLOOD PRESSURE: 116 MMHG | RESPIRATION RATE: 18 BRPM | HEART RATE: 79 BPM | BODY MASS INDEX: 30.91 KG/M2 | TEMPERATURE: 99 F | OXYGEN SATURATION: 98 % | DIASTOLIC BLOOD PRESSURE: 75 MMHG

## 2023-06-06 DIAGNOSIS — G11.11 FRIEDREICH'S ATAXIA: Primary | ICD-10-CM

## 2023-06-06 DIAGNOSIS — I48.91 ATRIAL FIBRILLATION, UNSPECIFIED TYPE: ICD-10-CM

## 2023-06-06 DIAGNOSIS — G11.11 FRIEDREICH'S ATAXIA: ICD-10-CM

## 2023-06-06 DIAGNOSIS — G11.11 FRIEDREICH ATAXIA: ICD-10-CM

## 2023-06-06 DIAGNOSIS — H91.8X3 OTHER SPECIFIED HEARING LOSS OF BOTH EARS: ICD-10-CM

## 2023-06-06 DIAGNOSIS — I10 PRIMARY HYPERTENSION: ICD-10-CM

## 2023-06-06 DIAGNOSIS — N31.9 NEUROGENIC BLADDER: ICD-10-CM

## 2023-06-06 DIAGNOSIS — R13.14 PHARYNGOESOPHAGEAL DYSPHAGIA: ICD-10-CM

## 2023-06-06 PROBLEM — G40.909 EPILEPSY: Status: ACTIVE | Noted: 2023-06-06

## 2023-06-06 PROBLEM — K21.9 GASTROESOPHAGEAL REFLUX DISEASE: Status: ACTIVE | Noted: 2023-06-06

## 2023-06-06 PROBLEM — K80.20 SYMPTOMATIC CHOLELITHIASIS: Status: ACTIVE | Noted: 2019-11-26

## 2023-06-06 PROBLEM — Z87.39 HISTORY OF FIBROMYALGIA: Status: ACTIVE | Noted: 2023-06-06

## 2023-06-06 PROBLEM — F20.9 SCHIZOPHRENIA: Status: ACTIVE | Noted: 2023-06-06

## 2023-06-06 PROBLEM — F32.A DEPRESSIVE DISORDER: Status: RESOLVED | Noted: 2023-06-06 | Resolved: 2023-06-06

## 2023-06-06 PROBLEM — F32.A DEPRESSIVE DISORDER: Status: ACTIVE | Noted: 2023-06-06

## 2023-06-06 PROBLEM — R25.2 SPASM: Status: ACTIVE | Noted: 2023-06-06

## 2023-06-06 PROBLEM — F32.9 MAJOR DEPRESSIVE DISORDER WITH SINGLE EPISODE: Status: ACTIVE | Noted: 2023-06-06

## 2023-06-06 LAB
ALBUMIN SERPL-MCNC: 3.9 G/DL (ref 3.5–5)
ALBUMIN/GLOB SERPL: 1 RATIO (ref 1.1–2)
ALP SERPL-CCNC: 156 UNIT/L (ref 40–150)
ALT SERPL-CCNC: 20 UNIT/L (ref 0–55)
AST SERPL-CCNC: 16 UNIT/L (ref 5–34)
BASOPHILS # BLD AUTO: 0.07 X10(3)/MCL
BASOPHILS NFR BLD AUTO: 1 %
BILIRUBIN DIRECT+TOT PNL SERPL-MCNC: 0.3 MG/DL
BUN SERPL-MCNC: 15.6 MG/DL (ref 8.9–20.6)
CALCIUM SERPL-MCNC: 9.7 MG/DL (ref 8.4–10.2)
CHLORIDE SERPL-SCNC: 104 MMOL/L (ref 98–107)
CO2 SERPL-SCNC: 28 MMOL/L (ref 22–29)
CREAT SERPL-MCNC: 1.03 MG/DL (ref 0.73–1.18)
EOSINOPHIL # BLD AUTO: 0.57 X10(3)/MCL (ref 0–0.9)
EOSINOPHIL NFR BLD AUTO: 8.3 %
ERYTHROCYTE [DISTWIDTH] IN BLOOD BY AUTOMATED COUNT: 13.4 % (ref 11.5–17)
EST. AVERAGE GLUCOSE BLD GHB EST-MCNC: 99.7 MG/DL
GFR SERPLBLD CREATININE-BSD FMLA CKD-EPI: >60 MLS/MIN/1.73/M2
GLOBULIN SER-MCNC: 4.1 GM/DL (ref 2.4–3.5)
GLUCOSE SERPL-MCNC: 100 MG/DL (ref 74–100)
HBA1C MFR BLD: 5.1 %
HCT VFR BLD AUTO: 48.7 % (ref 42–52)
HCV AB SERPL QL IA: NONREACTIVE
HGB BLD-MCNC: 16.5 G/DL (ref 14–18)
IMM GRANULOCYTES # BLD AUTO: 0.01 X10(3)/MCL (ref 0–0.04)
IMM GRANULOCYTES NFR BLD AUTO: 0.1 %
LYMPHOCYTES # BLD AUTO: 2.77 X10(3)/MCL (ref 0.6–4.6)
LYMPHOCYTES NFR BLD AUTO: 40.4 %
MCH RBC QN AUTO: 29.2 PG (ref 27–31)
MCHC RBC AUTO-ENTMCNC: 33.9 G/DL (ref 33–36)
MCV RBC AUTO: 86.2 FL (ref 80–94)
MONOCYTES # BLD AUTO: 0.68 X10(3)/MCL (ref 0.1–1.3)
MONOCYTES NFR BLD AUTO: 9.9 %
NEUTROPHILS # BLD AUTO: 2.75 X10(3)/MCL (ref 2.1–9.2)
NEUTROPHILS NFR BLD AUTO: 40.3 %
NRBC BLD AUTO-RTO: 0 %
PLATELET # BLD AUTO: 274 X10(3)/MCL (ref 130–400)
PMV BLD AUTO: 10.4 FL (ref 7.4–10.4)
POTASSIUM SERPL-SCNC: 4.2 MMOL/L (ref 3.5–5.1)
PROT SERPL-MCNC: 8 GM/DL (ref 6.4–8.3)
RBC # BLD AUTO: 5.65 X10(6)/MCL (ref 4.7–6.1)
SODIUM SERPL-SCNC: 140 MMOL/L (ref 136–145)
WBC # SPEC AUTO: 6.85 X10(3)/MCL (ref 4.5–11.5)

## 2023-06-06 PROCEDURE — 83036 HEMOGLOBIN GLYCOSYLATED A1C: CPT

## 2023-06-06 PROCEDURE — 86803 HEPATITIS C AB TEST: CPT

## 2023-06-06 PROCEDURE — 36415 COLL VENOUS BLD VENIPUNCTURE: CPT

## 2023-06-06 PROCEDURE — 99215 OFFICE O/P EST HI 40 MIN: CPT | Mod: PBBFAC

## 2023-06-06 PROCEDURE — 85025 COMPLETE CBC W/AUTO DIFF WBC: CPT

## 2023-06-06 PROCEDURE — 80053 COMPREHEN METABOLIC PANEL: CPT

## 2023-06-06 RX ORDER — NITROFURANTOIN MACROCRYSTALS 50 MG/1
100 CAPSULE ORAL NIGHTLY
Qty: 90 CAPSULE | Refills: 3 | Status: SHIPPED | OUTPATIENT
Start: 2023-06-06 | End: 2023-09-25 | Stop reason: SDUPTHER

## 2023-06-06 RX ORDER — DILTIAZEM HYDROCHLORIDE 240 MG/1
240 CAPSULE, EXTENDED RELEASE ORAL 2 TIMES DAILY
Qty: 60 CAPSULE | Refills: 11 | Status: SHIPPED | OUTPATIENT
Start: 2023-06-06

## 2023-06-06 RX ORDER — PANTOPRAZOLE SODIUM 40 MG/1
40 TABLET, DELAYED RELEASE ORAL DAILY
Qty: 30 TABLET | Refills: 3 | Status: SHIPPED | OUTPATIENT
Start: 2023-06-06 | End: 2024-01-08 | Stop reason: SDUPTHER

## 2023-06-06 RX ORDER — OXCARBAZEPINE 600 MG/1
600 TABLET, FILM COATED ORAL 2 TIMES DAILY PRN
Qty: 60 TABLET | Refills: 1
Start: 2023-06-06 | End: 2023-08-15 | Stop reason: SDUPTHER

## 2023-06-06 NOTE — PROGRESS NOTES
Chief Complaint  Annual Exam and Medication Refill      History of Present Illness  Anuj Caldwell is a 37 y.o. year old male presents to the clinic for routine visit and medication refill. Mom is still complaining of Anuj having recurrent episodes of choking on his saliva and would like to get suction.   Mom is also asking the 90L form to be filled out.           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 Dysphagia, follows with GI Dr. Reyes, pending modified barium swallow study, eats soft bite sized food due to choking episodes on saliva and solid food  3. GERD-on protonix 40mg daily  4. H/o A-fib requiring ablation, follows with Dr. Sanders with [facility]. On Metoprolol succcinate 25mg daily and Diltiazem 240mg BID (asked mom to inquire about proper dosage)  5. Neurogenic bladder, frequent recurrent UTI d/t chronic Shetty; follows with urology at [facility], monthly catheter changes, on Macrodantin 100mg daily for suppression therapy, US renal 04/2023 with bilat neprholithiasis without hydro, pyridium 200mg PRN   6. H/o kidney stones, recently sent for analysis in 03/2023  7. Hypertrophic cardiomyopathy-on metoprolol succinate, aspirin, diltiazem 240mg bid  8. Depression-follows with Dr. Membreno at [facility], on Rexulti, Trintellix, Vistaril, Remeron 30mg , Trileptal  9. HTN-on Losartan 50mg daily        Review of Systems   Constitutional:  Negative for chills and fever.   HENT:  Negative for congestion and ear pain.    Respiratory:  Negative for cough and shortness of breath.    Cardiovascular:  Negative for chest pain and leg swelling.   Gastrointestinal:  Negative for diarrhea and vomiting.   Genitourinary:  Negative for dysuria and frequency.   Skin:  Negative for rash.   Neurological:  Negative for seizures and headaches.       Physical Exam  Vitals reviewed.   Constitutional:       General: He is awake. He is not in  acute distress.     Comments: Sitting in wheelchair   HENT:      Head: Atraumatic.      Ears:      Comments: Cerumen noted in both ear canals     Mouth/Throat:      Mouth: Mucous membranes are moist.      Dentition: Normal dentition.   Cardiovascular:      Rate and Rhythm: Normal rate and regular rhythm.      Heart sounds: Normal heart sounds.   Pulmonary:      Effort: Pulmonary effort is normal.      Breath sounds: Normal breath sounds.   Abdominal:      General: Abdomen is flat.      Palpations: Abdomen is soft.   Neurological:      Mental Status: He is alert.      Motor: No atrophy or abnormal muscle tone.      Comments: Follows commands  BUE 5/5 strength  BLE 3/5 strength at baseline       Vitals:    06/06/23 1409   BP: 116/75   Pulse: 79   Resp: 18   Temp: 99 °F (37.2 °C)     Wt Readings from Last 2 Encounters:   03/02/23 76.7 kg (169 lb)   11/30/22 76.7 kg (169 lb)         Current Outpatient Medications  Current Outpatient Medications   Medication Instructions    aspirin (ECOTRIN) 81 mg, Oral, Daily    brexpiprazole (REXULTI) 2 mg, Oral    cyclobenzaprine (FLEXERIL) 10 mg, Oral    diltiaZEM (DILACOR XR) 240 mg, Oral, 2 times daily    docusate sodium (COLACE) 100 mg, Oral, 2 times daily    fluticasone (FLONASE) 50 mcg/actuation nasal spray 1 spray, Each Nostril, 2 times daily    folic acid (FOLVITE) 1 mg, Oral, Daily    hydrOXYzine pamoate (VISTARIL) 25 mg, Oral, 2 times daily PRN    losartan (COZAAR) 50 mg, Oral, Daily    metoprolol succinate (TOPROL-XL) 100 mg, Oral, Daily    mirtazapine (REMERON) 30 mg, Oral, Nightly    nitrofurantoin (MACRODANTIN) 100 mg, Oral, Nightly    ondansetron (ZOFRAN-ODT) 8 mg, Oral, 2 times daily    OXcarbazepine (TRILEPTAL) 600 mg, Oral, 2 times daily PRN    pantoprazole (PROTONIX) 40 mg, Oral, Daily    phenazopyridine (PYRIDIUM) 200 mg, Oral, Daily PRN    vortioxetine (TRINTELLIX) 20 mg, Oral             Assessment / Plan:    1. Friedreich's ataxia  2. Primary hypertension  - CBC  Auto Differential; Future  - Comprehensive Metabolic Panel; Future  - Hepatitis C Antibody; Future  - Hemoglobin A1C; Future    3. Pharyngoesophageal dysphagia  -due to choking episodes related to saliva and some solid food, patient would benefit from a suction device to decrease risk for aspiration and/or choking  -Rx printed and will be faxed to [facility] with supporting documentation    4. Other specified hearing loss of both ears  - Ambulatory referral/consult to Audiology; Future  -likely related to progression of underlying neuromuscular condition          Follow up:    In 3 months, or earlier if needed.     Noemi Martin M.D.  [facility] PGY-2

## 2023-06-07 NOTE — PROGRESS NOTES
I reviewed History, PE, A/P and chart was reviewed.  Services provided in the outpatient department of  a teaching facility, I was immediately available.  I agree with resident, care reasonable and necessary.   Management discussed with resident at time of visit.    Luli Mata MD  \[facility]\"" Family Medicine Residency - MAMADOU Perera  [facility]

## 2023-06-22 ENCOUNTER — TELEPHONE (OUTPATIENT)
Dept: UROLOGY | Facility: CLINIC | Age: 38
End: 2023-06-22
Payer: MEDICAID

## 2023-06-22 NOTE — TELEPHONE ENCOUNTER
Spoke to patients mother about supply options. Pt mother voiced understanding.     ----- Message from Dwain Monk NP sent at 6/20/2023  3:45 PM CDT -----  Regarding: RE: Urology Supplies  Can you please research to where patient can get saline syringes to flush catheter.  ----- Message -----  From: Michael Pacheco LPN  Sent: 6/20/2023  11:23 AM CDT  To: Dwain Monk NP  Subject: FW: Urology Supplies                               ----- Message -----  From: Zoraida Brunner  Sent: 6/20/2023  10:48 AM CDT  To: Michael Pacheco LPN  Subject: Urology Supplies                                 Patient's mom called stating home health is saying they cannot get 3cc syringe cath kits to flush catheters. [facility] does not carry this item, and they are not sure where to get this from covered under insurance.

## 2023-07-20 ENCOUNTER — CLINICAL SUPPORT (OUTPATIENT)
Dept: AUDIOLOGY | Facility: HOSPITAL | Age: 38
End: 2023-07-20
Payer: MEDICAID

## 2023-07-20 DIAGNOSIS — G11.11 FRIEDREICH'S ATAXIA: ICD-10-CM

## 2023-07-20 DIAGNOSIS — H90.3 SENSORINEURAL HEARING LOSS (SNHL) OF BOTH EARS: Primary | ICD-10-CM

## 2023-07-20 PROCEDURE — 92557 COMPREHENSIVE HEARING TEST: CPT | Performed by: AUDIOLOGIST-HEARING AID FITTER

## 2023-07-20 PROCEDURE — 92567 TYMPANOMETRY: CPT | Performed by: AUDIOLOGIST-HEARING AID FITTER

## 2023-07-20 NOTE — PROGRESS NOTES
Audiological Evaluation    Patient History:    Patient evaluated today to assess hearing.  He was accompanied to today's visit by his mother and caregiver who endorse significant hearing difficulties when communicating with Anuj.   His mother endorses his hearing has progressively worsened over time.  Dizziness, otalgia, otorrhea and a history of middle ear involvement/otologic procedures have been denied at this time.  Anuj does endorse constant tinnitus, bilaterally.  Patient's medical history has reportedly not changed since most recent medical evaluation.       Pure Tone Testing:    Right ear:       Severe rising to mild, SNHL at 4000 Hz    Left ear:          Moderately-severe rising SNHL rising to normal hearing at 8000 Hz        Tympanometry:      Right ear:   Type 'A' tympanogram    Left ear: Type 'A' tympanogram           Acoustic Reflex Testing    Right ear:   Did not test     Left ear: Did not test      DPOAE Testing    Right ear:  Present emissions 2666-6502 Hz    Left ear:  Present emissions 7680-4176 Hz      Interpretations:    Pure tone testing revealed a severe, low frequency sensorineural hearing loss rising to mild severity at 4000 Hz for the right ear. Testing for the left ear revealed moderately-severe, low frequency sensorineural hearing loss rising to normal hearing at 8000 Hz.  Speech reception thresholds were obtained at 80 dB HL (right ear) and 85 dB HL (left ear) which is elevated when compared to pure tone testing.  Today's testing was marked by poor reliability.  Immittance testing revealed Type A tympanograms, bilaterally, indicative of normal middle ear function. Otoscopy revealed clear EACs, bilaterally.      Recommendations:     Audiological testing annually  ENT evaluation per ENT  Hearing protection when exposed to hazardous noise  ABR testing is needed to validate thresholds as patient's mother is interested in amplification for Anuj    Sloan Ramsay  Clinical  Audiologist

## 2023-08-15 DIAGNOSIS — G11.11 FRIEDREICH ATAXIA: ICD-10-CM

## 2023-08-15 RX ORDER — OXCARBAZEPINE 600 MG/1
600 TABLET, FILM COATED ORAL 2 TIMES DAILY PRN
Qty: 60 TABLET | Refills: 1
Start: 2023-08-15 | End: 2023-09-12 | Stop reason: SDUPTHER

## 2023-09-12 DIAGNOSIS — G11.11 FRIEDREICH ATAXIA: ICD-10-CM

## 2023-09-12 RX ORDER — OXCARBAZEPINE 600 MG/1
600 TABLET, FILM COATED ORAL 2 TIMES DAILY PRN
Qty: 60 TABLET | Refills: 1
Start: 2023-09-12 | End: 2023-11-09 | Stop reason: SDUPTHER

## 2023-09-25 ENCOUNTER — OFFICE VISIT (OUTPATIENT)
Dept: UROLOGY | Facility: CLINIC | Age: 38
End: 2023-09-25
Payer: MEDICAID

## 2023-09-25 VITALS
WEIGHT: 169 LBS | SYSTOLIC BLOOD PRESSURE: 131 MMHG | DIASTOLIC BLOOD PRESSURE: 90 MMHG | OXYGEN SATURATION: 96 % | HEIGHT: 62 IN | TEMPERATURE: 98 F | HEART RATE: 76 BPM | RESPIRATION RATE: 20 BRPM | BODY MASS INDEX: 31.1 KG/M2

## 2023-09-25 DIAGNOSIS — R30.0 DYSURIA: ICD-10-CM

## 2023-09-25 DIAGNOSIS — N31.9 NEUROGENIC BLADDER: Primary | ICD-10-CM

## 2023-09-25 LAB
BILIRUB SERPL-MCNC: NEGATIVE MG/DL
BLOOD URINE, POC: NORMAL
COLOR, POC UA: YELLOW
GLUCOSE UR QL STRIP: NEGATIVE
KETONES UR QL STRIP: NEGATIVE
LEUKOCYTE ESTERASE URINE, POC: NORMAL
NITRITE, POC UA: NEGATIVE
PH, POC UA: 7
PROTEIN, POC: NEGATIVE
SPECIFIC GRAVITY, POC UA: 1.02
UROBILINOGEN, POC UA: 0.2

## 2023-09-25 PROCEDURE — 1160F RVW MEDS BY RX/DR IN RCRD: CPT | Mod: CPTII,,, | Performed by: NURSE PRACTITIONER

## 2023-09-25 PROCEDURE — 99213 PR OFFICE/OUTPT VISIT, EST, LEVL III, 20-29 MIN: ICD-10-PCS | Mod: S$PBB,,, | Performed by: NURSE PRACTITIONER

## 2023-09-25 PROCEDURE — 3080F DIAST BP >= 90 MM HG: CPT | Mod: CPTII,,, | Performed by: NURSE PRACTITIONER

## 2023-09-25 PROCEDURE — 3080F PR MOST RECENT DIASTOLIC BLOOD PRESSURE >= 90 MM HG: ICD-10-PCS | Mod: CPTII,,, | Performed by: NURSE PRACTITIONER

## 2023-09-25 PROCEDURE — 99213 OFFICE O/P EST LOW 20 MIN: CPT | Mod: S$PBB,,, | Performed by: NURSE PRACTITIONER

## 2023-09-25 PROCEDURE — 3044F PR MOST RECENT HEMOGLOBIN A1C LEVEL <7.0%: ICD-10-PCS | Mod: CPTII,,, | Performed by: NURSE PRACTITIONER

## 2023-09-25 PROCEDURE — 1160F PR REVIEW ALL MEDS BY PRESCRIBER/CLIN PHARMACIST DOCUMENTED: ICD-10-PCS | Mod: CPTII,,, | Performed by: NURSE PRACTITIONER

## 2023-09-25 PROCEDURE — 1159F PR MEDICATION LIST DOCUMENTED IN MEDICAL RECORD: ICD-10-PCS | Mod: CPTII,,, | Performed by: NURSE PRACTITIONER

## 2023-09-25 PROCEDURE — 4010F PR ACE/ARB THEARPY RXD/TAKEN: ICD-10-PCS | Mod: CPTII,,, | Performed by: NURSE PRACTITIONER

## 2023-09-25 PROCEDURE — 99214 OFFICE O/P EST MOD 30 MIN: CPT | Mod: PBBFAC | Performed by: NURSE PRACTITIONER

## 2023-09-25 PROCEDURE — 3008F PR BODY MASS INDEX (BMI) DOCUMENTED: ICD-10-PCS | Mod: CPTII,,, | Performed by: NURSE PRACTITIONER

## 2023-09-25 PROCEDURE — 4010F ACE/ARB THERAPY RXD/TAKEN: CPT | Mod: CPTII,,, | Performed by: NURSE PRACTITIONER

## 2023-09-25 PROCEDURE — 87088 URINE BACTERIA CULTURE: CPT | Performed by: NURSE PRACTITIONER

## 2023-09-25 PROCEDURE — 3075F PR MOST RECENT SYSTOLIC BLOOD PRESS GE 130-139MM HG: ICD-10-PCS | Mod: CPTII,,, | Performed by: NURSE PRACTITIONER

## 2023-09-25 PROCEDURE — 81001 URINALYSIS AUTO W/SCOPE: CPT | Mod: PBBFAC | Performed by: NURSE PRACTITIONER

## 2023-09-25 PROCEDURE — 3008F BODY MASS INDEX DOCD: CPT | Mod: CPTII,,, | Performed by: NURSE PRACTITIONER

## 2023-09-25 PROCEDURE — 3075F SYST BP GE 130 - 139MM HG: CPT | Mod: CPTII,,, | Performed by: NURSE PRACTITIONER

## 2023-09-25 PROCEDURE — 1159F MED LIST DOCD IN RCRD: CPT | Mod: CPTII,,, | Performed by: NURSE PRACTITIONER

## 2023-09-25 PROCEDURE — 87077 CULTURE AEROBIC IDENTIFY: CPT | Mod: 91 | Performed by: NURSE PRACTITIONER

## 2023-09-25 PROCEDURE — 3044F HG A1C LEVEL LT 7.0%: CPT | Mod: CPTII,,, | Performed by: NURSE PRACTITIONER

## 2023-09-25 RX ORDER — NITROFURANTOIN MACROCRYSTALS 50 MG/1
50 CAPSULE ORAL NIGHTLY
Qty: 90 CAPSULE | Refills: 3 | Status: SHIPPED | OUTPATIENT
Start: 2023-09-25

## 2023-09-25 NOTE — PROGRESS NOTES
Placed in room. Seen by Yung Monk NP. Spoke with patient. U/A obtained and sent to lab for C&S. RTC in 6 months.

## 2023-09-25 NOTE — PROGRESS NOTES
Chief Complaint:   Chief Complaint   Patient presents with    neurogenic bladder       HPI:  Patient is a 36-year-old male 6 month follow-up for neurogenic bladder.  Patient's last visit he was educated on persistent catheter changes monthly.  On patient's last visit he was educated on compliance with catheter changes to ensure he has a least amount of bladder infections into only treat symptomatic UTI.  Today patient complaining of bladder pain mother would like to have urine checked for culture and sensitivity.  Continue current treatment with suppression therapy nitrofurantoin.    Allergies:  Review of patient's allergies indicates:   Allergen Reactions    Amoxicillin Other (See Comments)     Pt states does not know reaction (was a baby at that time)       Medications:  Current Outpatient Medications   Medication Sig Dispense Refill    aspirin (ECOTRIN) 81 MG EC tablet Take 1 tablet (81 mg total) by mouth once daily.      brexpiprazole (REXULTI) 2 mg Tab Take 2 mg by mouth.      cyclobenzaprine (FLEXERIL) 10 MG tablet Take 10 mg by mouth.      diltiaZEM (DILACOR XR) 240 MG CDCR Take 1 capsule (240 mg total) by mouth 2 (two) times a day. 60 capsule 11    docusate sodium (COLACE) 100 MG capsule Take 100 mg by mouth 2 (two) times daily.      folic acid (FOLVITE) 1 MG tablet Take 1 mg by mouth once daily.      losartan (COZAAR) 50 MG tablet Take 50 mg by mouth once daily.      metoprolol succinate (TOPROL-XL) 25 MG 24 hr tablet Take 100 mg by mouth once daily at 6am.      mirtazapine (REMERON) 30 MG tablet Take 1 tablet (30 mg total) by mouth every evening. 30 tablet 0    ondansetron (ZOFRAN-ODT) 8 MG TbDL Take 8 mg by mouth 2 (two) times daily.      OXcarbazepine (TRILEPTAL) 600 MG Tab Take 1 tablet (600 mg total) by mouth 2 (two) times daily as needed. 60 tablet 1    pantoprazole (PROTONIX) 40 MG tablet Take 1 tablet (40 mg total) by mouth once daily. 30 tablet 3    phenazopyridine (PYRIDIUM) 200 MG tablet Take 200  mg by mouth daily as needed.      vortioxetine (TRINTELLIX) 20 mg Tab Take 20 mg by mouth.      fluticasone (FLONASE) 50 mcg/actuation nasal spray 1 spray by Each Nare route 2 (two) times daily.       hydrOXYzine pamoate (VISTARIL) 25 MG Cap Take 25 mg by mouth 2 (two) times daily as needed.      nitrofurantoin (MACRODANTIN) 50 MG capsule Take 2 capsules (100 mg total) by mouth every evening. (Patient not taking: Reported on 2023) 90 capsule 3     No current facility-administered medications for this visit.       Review of Systems:  General: No fever, chills, fatigability, or weight loss.  Skin: No rashes, itching, or changes in color or texture of skin.  Chest: Denies MCDONALD, cyanosis, wheezing, cough, and sputum production.  Abdomen: Appetite fine. No weight loss. Denies diarrhea, abdominal pain, hematemesis, or blood in stool.  Musculoskeletal: No joint stiffness or swelling. Denies back pain.  : As above.  All other review of systems negative.    PMH:  Past Medical History:   Diagnosis Date    Primary hypertension 2023    Seizures        PSH:  Past Surgical History:   Procedure Laterality Date    WISDOM TOOTH EXTRACTION         FamHx:  Family History   Problem Relation Age of Onset    Hypertension Mother     Arrhythmia Father     Heart attack Father     Hypertension Father     Arrhythmia Sister     Heart attack Maternal Grandmother     Stroke Maternal Grandmother     Heart attack Maternal Grandfather        SocHx:  Social History     Socioeconomic History    Marital status: Single   Tobacco Use    Smoking status: Former     Current packs/day: 0.00     Average packs/day: 0.3 packs/day for 10.0 years (2.5 ttl pk-yrs)     Types: Cigarettes     Start date: 1/15/2010     Quit date: 3/25/2014     Years since quittin.5     Passive exposure: Current    Smokeless tobacco: Never   Substance and Sexual Activity    Alcohol use: No    Drug use: No       Physical Exam:  Vitals:    23 1025   BP: (!)  131/90   Pulse: 76   Resp: 20   Temp: 98.2 °F (36.8 °C)     General: A&Ox3, no apparent distress, no deformities  Neck: No masses, normal thyroid  Lungs: CTA griselda, no use of accessory muscles  Heart: RRR, no arrhythmias  Abdomen: Soft, NT, ND, no masses, no hernias, no hepatosplenomegaly  Lymphatic: Neck and groin nodes negative  Skin: The skin is warm and dry. No jaundice.  Ext: No c/c/e.      Impression:  1. Neurogenic bladder  - POCT URINE DIPSTICK WITH MICROSCOPE, AUTOMATED      Plan:  Instructed patient and caregiver get a UA culture and sensitivity now and notify the results.  Instructed caregiver to ensure patient continue with bladder flushes and also catheter changes as previously indicated.  Reinforced patient teaching on treating symptomatic UTIs only.  RTC 6 months.

## 2023-09-26 ENCOUNTER — OFFICE VISIT (OUTPATIENT)
Dept: FAMILY MEDICINE | Facility: CLINIC | Age: 38
End: 2023-09-26
Payer: MEDICAID

## 2023-09-26 VITALS
OXYGEN SATURATION: 94 % | HEIGHT: 62 IN | BODY MASS INDEX: 42.26 KG/M2 | RESPIRATION RATE: 20 BRPM | DIASTOLIC BLOOD PRESSURE: 78 MMHG | SYSTOLIC BLOOD PRESSURE: 127 MMHG | TEMPERATURE: 98 F | WEIGHT: 229.63 LBS | HEART RATE: 67 BPM

## 2023-09-26 DIAGNOSIS — N31.9 NEUROGENIC BLADDER: Primary | ICD-10-CM

## 2023-09-26 DIAGNOSIS — Z87.440 HISTORY OF RECURRENT URINARY TRACT INFECTION: ICD-10-CM

## 2023-09-26 PROCEDURE — 99214 OFFICE O/P EST MOD 30 MIN: CPT | Mod: PBBFAC | Performed by: STUDENT IN AN ORGANIZED HEALTH CARE EDUCATION/TRAINING PROGRAM

## 2023-09-26 RX ORDER — HYDROXYZINE HYDROCHLORIDE 25 MG/1
TABLET, FILM COATED ORAL
COMMUNITY
End: 2023-09-26

## 2023-09-26 RX ORDER — MUPIROCIN 20 MG/G
OINTMENT TOPICAL
COMMUNITY
Start: 2023-08-06

## 2023-09-26 NOTE — PROGRESS NOTES
Chief Complaint  Follow-up (3 month follow up, mother says possible uti)      History of Present Illness  Anuj Caldwell is a 37 y.o. year old male presents to the clinic for for medication refill and bladder pain. Pt is with h/o recurrent UTI and on suppression with Macrodantin 100mg daily. Recently started experiencing bladder pain. Was seen by SALLIE Monk at [facility], urine cultures pending. Mom states pt had a zoom meeting with psych provider, had his Rexulti increased. Also has a hearing test scheduled for next month. No fevers. No more chocking episodes with using suction.       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 Dysphagia, follows with GI Dr. Reyes, pending modified barium swallow study, eats soft bite sized food due to choking episodes on saliva and solid food  3. GERD-on protonix 40mg daily  4. H/o A-fib requiring ablation, follows with Dr. Sanders with [facility]. On Metoprolol succcinate 25mg daily and Diltiazem 240mg BID (asked mom to inquire about proper dosage)  5. Neurogenic bladder, frequent recurrent UTI d/t chronic Shetty; follows with urology at [facility], monthly catheter changes, on Macrodantin 100mg daily for suppression therapy, US renal 04/2023 with bilat neprholithiasis without hydro, pyridium 200mg PRN   6. H/o kidney stones, recently sent for analysis in 03/2023  7. Hypertrophic cardiomyopathy-on metoprolol succinate, aspirin, diltiazem 240mg bid  8. Depression-follows with Dr. Membreno at [facility], on Rexulti, Trintellix, Vistaril, Remeron 30mg , Trileptal  9. HTN-on Losartan 50mg daily      Review of Systems   Constitutional:  Negative for chills and fever.   Respiratory:  Negative for shortness of breath.    Cardiovascular:  Negative for chest pain, palpitations and leg swelling.   Gastrointestinal:  Negative for constipation, diarrhea, nausea and vomiting.   Genitourinary:         As noted  in HPI   Skin:  Negative for rash.   Neurological:  Negative for headaches.         Physical Exam  Vitals and nursing note reviewed.   Constitutional:       General: He is not in acute distress.     Comments: In wheelchair, NAD   Eyes:      Conjunctiva/sclera: Conjunctivae normal.   Cardiovascular:      Rate and Rhythm: Normal rate and regular rhythm.      Heart sounds: Normal heart sounds.   Pulmonary:      Effort: Pulmonary effort is normal.      Breath sounds: Normal breath sounds.   Abdominal:      Comments: No suprapubic tenderness   Genitourinary:     Comments: Shetty bag with cloudy urine  Musculoskeletal:      Right lower leg: No edema.      Left lower leg: No edema.   Neurological:      General: No focal deficit present.   Psychiatric:         Mood and Affect: Mood normal.         Vitals:    09/26/23 1103   BP: 127/78   Pulse: 67   Resp: 20   Temp: 98.3 °F (36.8 °C)     Wt Readings from Last 2 Encounters:   09/26/23 104.1 kg (229 lb 9.6 oz)   09/25/23 76.7 kg (169 lb)         Current Outpatient Medications  Current Outpatient Medications   Medication Instructions    aspirin (ECOTRIN) 81 mg, Oral, Daily    brexpiprazole (REXULTI) 2 mg, Oral    cyclobenzaprine (FLEXERIL) 10 mg, Oral    diltiaZEM (DILACOR XR) 240 mg, Oral, 2 times daily    docusate sodium (COLACE) 100 mg, Oral, 2 times daily    fluticasone (FLONASE) 50 mcg/actuation nasal spray 1 spray, Each Nostril, 2 times daily    folic acid (FOLVITE) 1 mg, Oral, Daily    hydrOXYzine pamoate (VISTARIL) 25 mg, Oral, 2 times daily PRN    losartan (COZAAR) 50 mg, Oral, Daily    metoprolol succinate (TOPROL-XL) 100 mg, Oral, Daily    mirtazapine (REMERON) 30 mg, Oral, Nightly    mupirocin (BACTROBAN) 2 % ointment SMARTSIG:sparingly Topical Twice Daily    nitrofurantoin (MACRODANTIN) 50 mg, Oral, Nightly    ondansetron (ZOFRAN-ODT) 8 mg, Oral, 2 times daily    OXcarbazepine (TRILEPTAL) 600 mg, Oral, 2 times daily PRN    pantoprazole (PROTONIX) 40 mg, Oral, Daily     phenazopyridine (PYRIDIUM) 200 mg, Oral, Daily PRN    vortioxetine (TRINTELLIX) 20 mg, Oral             Assessment / Plan:    1. Neurogenic bladder  2. History of recurrent urinary tract infection  -urine culture pending  -will defer to urologist for further management  -pt is non-toxic appearing, VSS        Follow up:    In 6 mo, or earlier if needed.     Noemi Martin M.D.  [employer] [facility] PGY-3

## 2023-09-27 ENCOUNTER — TELEPHONE (OUTPATIENT)
Dept: FAMILY MEDICINE | Facility: CLINIC | Age: 38
End: 2023-09-27
Payer: MEDICAID

## 2023-09-27 NOTE — TELEPHONE ENCOUNTER
9/27/23    LCSW received a referral from Dr. Martin requesting assistance in navigating hearing aid resources. Patient is currently being seen by [facility]. LCSW contacted [facility] and spoke to Blanche who advised that patient has an appointment on 10/11/23 to determine need for hearing aids. If the testing indicates that patient needs hearing aids, patient is unable to get them at [facility] due to insurance barriers. [facility] is the only Medicaid plan that covers hearing aids for adults. Patient has traditional [place] Medicaid. [facility] also does not offer the opportunity for patient to self-pay for hearing aids. Therefore, [facility] would refer the patient to a private hearing aid office where patient would need to private pay for the hearing aids. Typical cost of hearing aids range but likely start at over $1500 per ear.     The Dept. of Health has a Hearing Aid Program, however, patient does not meet the age requirement of 50 years or older. Traditional [place] Medicaid only covers hearing aids for individuals ages 0 to 20 years old.     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 WOLFGANGW researched disease based financial assistance and contacted Lisa [[email or web address]] with [employer]- a non-profit organization that provides support to patient's diagnosed with FA.     Another agency that may be able to assist is [facility]'s Operation Round Up. In the event [employer] is unable to assist, LCSW will provide family with information on [facility]'s Operation Round Up program. LCSW will wait to hear back from Lisa before contacting the family to discuss resources. LCSMELCHOR will follow up.   --------------------------------------------------    9/28/23    LCSW did not receive a return email from the [employer]. CURLY also observed that the most recent application date on the [employer] website was 2008, making CURLY concerned it is no longer an  active program.     LCSW contacted patient's mother, Gladys, to discuss [facility]'s Operation Round Up program. She is interested in applying for this assistance. LCSW provided Gladys with the information on this program and encouraged her to contact LCSW for further unmet needs, concerns, or barriers. LCSW will follow up as needed.

## 2023-09-28 ENCOUNTER — EXTERNAL HOME HEALTH (OUTPATIENT)
Dept: HOME HEALTH SERVICES | Facility: HOSPITAL | Age: 38
End: 2023-09-28
Payer: MEDICAID

## 2023-09-29 LAB
BACTERIA UR CULT: ABNORMAL
BACTERIA UR CULT: ABNORMAL

## 2023-09-29 RX ORDER — CEFDINIR 300 MG/1
300 CAPSULE ORAL 2 TIMES DAILY
Qty: 14 CAPSULE | Refills: 0 | Status: SHIPPED | OUTPATIENT
Start: 2023-09-29 | End: 2023-10-09

## 2023-10-09 NOTE — PROGRESS NOTES
Faculty addendum: Care provided reasonable and necessary. I participated in the management of the patient and was immediately available throughout the encounter. Services were furnished in a primary care center located in the outpatient department of a teaching hospital. I agree with the resident's findings and plan as documented in the resident's note.     Aracelis Monsivais, DO

## 2023-10-17 ENCOUNTER — APPOINTMENT (OUTPATIENT)
Dept: LAB | Facility: HOSPITAL | Age: 38
End: 2023-10-17
Attending: NURSE PRACTITIONER
Payer: MEDICAID

## 2023-10-17 DIAGNOSIS — N20.0 KIDNEY STONES: Primary | ICD-10-CM

## 2023-10-17 LAB
APPEARANCE UR: ABNORMAL
BACTERIA #/AREA URNS AUTO: ABNORMAL /HPF
BILIRUB UR QL STRIP.AUTO: NEGATIVE
COLOR UR AUTO: YELLOW
GLUCOSE UR QL STRIP.AUTO: NORMAL
HYALINE CASTS #/AREA URNS LPF: ABNORMAL /LPF
KETONES UR QL STRIP.AUTO: NEGATIVE
LEUKOCYTE ESTERASE UR QL STRIP.AUTO: 500
NITRITE UR QL STRIP.AUTO: ABNORMAL
PH UR STRIP.AUTO: 8 [PH]
PROT UR QL STRIP.AUTO: ABNORMAL
RBC #/AREA URNS AUTO: ABNORMAL /HPF
RBC UR QL AUTO: ABNORMAL
SP GR UR STRIP.AUTO: 1.01 (ref 1–1.03)
SQUAMOUS #/AREA URNS LPF: ABNORMAL /HPF
UROBILINOGEN UR STRIP-ACNC: NORMAL
WBC #/AREA URNS AUTO: ABNORMAL /HPF

## 2023-10-17 PROCEDURE — 87088 URINE BACTERIA CULTURE: CPT

## 2023-10-17 PROCEDURE — 81001 URINALYSIS AUTO W/SCOPE: CPT

## 2023-10-17 PROCEDURE — 87186 SC STD MICRODIL/AGAR DIL: CPT

## 2023-10-20 DIAGNOSIS — T83.511D URINARY TRACT INFECTION ASSOCIATED WITH INDWELLING URETHRAL CATHETER, SUBSEQUENT ENCOUNTER: Primary | ICD-10-CM

## 2023-10-20 DIAGNOSIS — N39.0 URINARY TRACT INFECTION ASSOCIATED WITH INDWELLING URETHRAL CATHETER, SUBSEQUENT ENCOUNTER: Primary | ICD-10-CM

## 2023-10-20 LAB
BACTERIA UR CULT: ABNORMAL
BACTERIA UR CULT: ABNORMAL

## 2023-10-20 RX ORDER — GRANULES FOR ORAL 3 G/1
3 POWDER ORAL ONCE
Qty: 3 G | Refills: 0 | Status: SHIPPED | OUTPATIENT
Start: 2023-10-20 | End: 2023-10-20

## 2023-10-20 RX ORDER — LEVOFLOXACIN 750 MG/1
750 TABLET ORAL DAILY
Qty: 7 TABLET | Refills: 0 | Status: SHIPPED | OUTPATIENT
Start: 2023-10-20

## 2023-10-26 ENCOUNTER — APPOINTMENT (OUTPATIENT)
Dept: LAB | Facility: HOSPITAL | Age: 38
End: 2023-10-26
Attending: NURSE PRACTITIONER
Payer: MEDICAID

## 2023-10-26 DIAGNOSIS — G11.11 FRIEDREICH'S ATAXIA: Primary | ICD-10-CM

## 2023-10-26 DIAGNOSIS — F32.9 MAJOR DEPRESSIVE DISORDER WITH SINGLE EPISODE: ICD-10-CM

## 2023-10-26 DIAGNOSIS — N20.0 KIDNEY STONES: ICD-10-CM

## 2023-10-26 DIAGNOSIS — N31.9 NEUROMUSCULAR DYSFUNCTION OF BLADDER, UNSPECIFIED: ICD-10-CM

## 2023-10-26 PROCEDURE — 82365 CALCULUS SPECTROSCOPY: CPT

## 2023-11-01 LAB
CELL MATERIAL STONE EST-MCNT: NORMAL %
LABORATORY COMMENT REPORT: NORMAL
SPECIMEN SOURCE: NORMAL

## 2023-11-02 ENCOUNTER — OFFICE VISIT (OUTPATIENT)
Dept: UROLOGY | Facility: CLINIC | Age: 38
End: 2023-11-02
Payer: MEDICAID

## 2023-11-02 DIAGNOSIS — N39.0 URINARY TRACT INFECTION ASSOCIATED WITH INDWELLING URETHRAL CATHETER, SUBSEQUENT ENCOUNTER: Primary | ICD-10-CM

## 2023-11-02 DIAGNOSIS — T83.511D URINARY TRACT INFECTION ASSOCIATED WITH INDWELLING URETHRAL CATHETER, SUBSEQUENT ENCOUNTER: Primary | ICD-10-CM

## 2023-11-02 PROCEDURE — 4010F PR ACE/ARB THEARPY RXD/TAKEN: ICD-10-PCS | Mod: CPTII,95,, | Performed by: NURSE PRACTITIONER

## 2023-11-02 PROCEDURE — 1159F PR MEDICATION LIST DOCUMENTED IN MEDICAL RECORD: ICD-10-PCS | Mod: CPTII,95,, | Performed by: NURSE PRACTITIONER

## 2023-11-02 PROCEDURE — 3044F HG A1C LEVEL LT 7.0%: CPT | Mod: CPTII,95,, | Performed by: NURSE PRACTITIONER

## 2023-11-02 PROCEDURE — 1159F MED LIST DOCD IN RCRD: CPT | Mod: CPTII,95,, | Performed by: NURSE PRACTITIONER

## 2023-11-02 PROCEDURE — 99213 OFFICE O/P EST LOW 20 MIN: CPT | Mod: 95,,, | Performed by: NURSE PRACTITIONER

## 2023-11-02 PROCEDURE — 4010F ACE/ARB THERAPY RXD/TAKEN: CPT | Mod: CPTII,95,, | Performed by: NURSE PRACTITIONER

## 2023-11-02 PROCEDURE — 99213 PR OFFICE/OUTPT VISIT, EST, LEVL III, 20-29 MIN: ICD-10-PCS | Mod: 95,,, | Performed by: NURSE PRACTITIONER

## 2023-11-02 PROCEDURE — 3044F PR MOST RECENT HEMOGLOBIN A1C LEVEL <7.0%: ICD-10-PCS | Mod: CPTII,95,, | Performed by: NURSE PRACTITIONER

## 2023-11-02 NOTE — PROGRESS NOTES
Established Patient - Audio Only Telehealth Visit     The patient location is: home  The chief complaint leading to consultation is:  Kidney stone evaluation  Visit type: Virtual visit with audio only (telephone)  Total time spent with patient:  25  minutes     The reason for the audio only service rather than synchronous audio and video virtual visit was related to technical difficulties or patient preference/necessity.     Each patient to whom I provide medical services by telemedicine is:  (1) informed of the relationship between the physician and patient and the respective role of any other health care provider with respect to management of the patient; and (2) notified that they may decline to receive medical services by telemedicine and may withdraw from such care at any time. Patient verbally consented to receive this service via voice-only telephone call.     This service was not originating from a related E/M service provided within the previous 7 days nor will  to an E/M service or procedure within the next 24 hours or my soonest available appointment.  Prevailing standard of care was able to be met in this audio-only visit.      Chief Complaint: No chief complaint on file.      HPI: ? Flank pain, groin pain, lower abdominal pain, evidence of passing stone, urinary frequency, urgency, incontinence, retention, dysuria, gross hematuria. Family history of stones, or urology pathology.     Stone surveillance, Renal US, KUB, CT, F/U   months. Increase fluid intake, limit salt in diet, limit protein to 30%, intake adequate calcium, increase brand, soy, Beets, nuts. Instructed patient on Avoiding bladder irritants, such as alcohol, citrus drinks or foods,salty foods, energy drinks, spicy foods, caffeine, sodas.    Allergies:  Review of patient's allergies indicates:   Allergen Reactions    Amoxicillin Other (See Comments)     Pt states does not know reaction (was a baby at that time)  Mother reports high  fever       Medications:  Current Outpatient Medications   Medication Sig Dispense Refill    aspirin (ECOTRIN) 81 MG EC tablet Take 1 tablet (81 mg total) by mouth once daily.      brexpiprazole (REXULTI) 2 mg Tab Take 2 mg by mouth.      cyclobenzaprine (FLEXERIL) 10 MG tablet Take 10 mg by mouth.      diltiaZEM (DILACOR XR) 240 MG CDCR Take 1 capsule (240 mg total) by mouth 2 (two) times a day. 60 capsule 11    docusate sodium (COLACE) 100 MG capsule Take 100 mg by mouth 2 (two) times daily.      fluticasone (FLONASE) 50 mcg/actuation nasal spray 1 spray by Each Nare route 2 (two) times daily.       folic acid (FOLVITE) 1 MG tablet Take 1 mg by mouth once daily.      hydrOXYzine pamoate (VISTARIL) 25 MG Cap Take 25 mg by mouth 2 (two) times daily as needed.      levoFLOXacin (LEVAQUIN) 750 MG tablet Take 1 tablet (750 mg total) by mouth once daily. 7 tablet 0    losartan (COZAAR) 50 MG tablet Take 50 mg by mouth once daily.      metoprolol succinate (TOPROL-XL) 25 MG 24 hr tablet Take 100 mg by mouth once daily at 6am.      mirtazapine (REMERON) 30 MG tablet Take 1 tablet (30 mg total) by mouth every evening. 30 tablet 0    mupirocin (BACTROBAN) 2 % ointment SMARTSIG:sparingly Topical Twice Daily      nitrofurantoin (MACRODANTIN) 50 MG capsule Take 1 capsule (50 mg total) by mouth every evening. 90 capsule 3    ondansetron (ZOFRAN-ODT) 8 MG TbDL Take 8 mg by mouth 2 (two) times daily.      OXcarbazepine (TRILEPTAL) 600 MG Tab Take 1 tablet (600 mg total) by mouth 2 (two) times daily as needed. 60 tablet 1    pantoprazole (PROTONIX) 40 MG tablet Take 1 tablet (40 mg total) by mouth once daily. 30 tablet 3    phenazopyridine (PYRIDIUM) 200 MG tablet Take 200 mg by mouth daily as needed.      vortioxetine (TRINTELLIX) 20 mg Tab Take 20 mg by mouth.       No current facility-administered medications for this visit.       Review of Systems:  General: No fever, chills, fatigability, or weight loss.  Skin: No rashes,  itching, or changes in color or texture of skin.  Chest: Denies MCDONALD, cyanosis, wheezing, cough, and sputum production.  Abdomen: Appetite fine. No weight loss. Denies diarrhea, abdominal pain, hematemesis, or blood in stool.  Musculoskeletal: No joint stiffness or swelling. Denies back pain.  : As above.  All other review of systems negative.    PMH:  Past Medical History:   Diagnosis Date    Primary hypertension 2023    Seizures        PSH:  Past Surgical History:   Procedure Laterality Date    WISDOM TOOTH EXTRACTION         FamHx:  Family History   Problem Relation Age of Onset    Hypertension Mother     Arrhythmia Father     Heart attack Father     Hypertension Father     Arrhythmia Sister     Heart attack Maternal Grandmother     Stroke Maternal Grandmother     Heart attack Maternal Grandfather        SocHx:  Social History     Socioeconomic History    Marital status: Single   Tobacco Use    Smoking status: Former     Current packs/day: 0.00     Average packs/day: 0.3 packs/day for 10.0 years (2.5 ttl pk-yrs)     Types: Cigarettes     Start date: 1/15/2010     Quit date: 3/25/2014     Years since quittin.6     Passive exposure: Current    Smokeless tobacco: Never   Substance and Sexual Activity    Alcohol use: No    Drug use: No       Physical Exam:  There were no vitals filed for this visit.      Imaging:       Impression:  1.  Kidney stones      Plan: Increase fluid intake, limit salt in diet, limit protein to 30%, intake adequate calcium, decrease brand, soy, Beets, Almonds, Rhubard, Buckwheat flour, baked potato, raspberry, potato chips Spinach, Miso, Tahini, sesame, Swiss Chard. Instructed patient on Avoiding bladder irritants, such as alcohol, citrus drinks or foods,salty foods, energy drinks, spicy foods, caffeine, sodas. RTC 6 months

## 2023-11-09 DIAGNOSIS — G11.11 FRIEDREICH ATAXIA: ICD-10-CM

## 2023-11-09 RX ORDER — OXCARBAZEPINE 600 MG/1
600 TABLET, FILM COATED ORAL 2 TIMES DAILY PRN
Qty: 60 TABLET | Refills: 1 | Status: SHIPPED | OUTPATIENT
Start: 2023-11-09 | End: 2024-01-08 | Stop reason: SDUPTHER

## 2024-01-08 ENCOUNTER — CLINICAL SUPPORT (OUTPATIENT)
Dept: AUDIOLOGY | Facility: HOSPITAL | Age: 39
End: 2024-01-08
Payer: MEDICAID

## 2024-01-08 DIAGNOSIS — H90.3 SENSORINEURAL HEARING LOSS (SNHL) OF BOTH EARS: Primary | ICD-10-CM

## 2024-01-08 DIAGNOSIS — G11.11 FRIEDREICH ATAXIA: ICD-10-CM

## 2024-01-08 PROCEDURE — 92567 TYMPANOMETRY: CPT | Performed by: AUDIOLOGIST

## 2024-01-08 PROCEDURE — 92652 AEP THRSHLD EST MLT FREQ I&R: CPT | Performed by: AUDIOLOGIST

## 2024-01-08 RX ORDER — PANTOPRAZOLE SODIUM 40 MG/1
40 TABLET, DELAYED RELEASE ORAL DAILY
Qty: 30 TABLET | Refills: 3 | Status: SHIPPED | OUTPATIENT
Start: 2024-01-08

## 2024-01-08 RX ORDER — OXCARBAZEPINE 600 MG/1
600 TABLET, FILM COATED ORAL 2 TIMES DAILY PRN
Qty: 60 TABLET | Refills: 1 | Status: SHIPPED | OUTPATIENT
Start: 2024-01-08 | End: 2024-03-08

## 2024-01-08 NOTE — PROGRESS NOTES
HEARING EVALUATION    CASE HISTORY:  (24) Anuj Caldwell  1985 is a 38 y.o. male  referred by PCP Noemi Martin MD for hearing evaluation due to inconsistent behavioral audiogram. He was previously evaluated in clinic on 23 and testing revealed:   Right ear: Severe rising to mild, SNHL at 4000 Hz  Left ear:  Moderately-severe rising SNHL rising to normal hearing at 8000 Hz  He is accompanied today by his mother. Anuj reports bilateral ear pain and tinnitus. He has Friedreich's ataxia. No history of middle ear disorder/surgery. He endorses hearing difficulty. Mother is interested in pursuing hearing aids. She was advised to go through [facility] [place].     INTERPRETATION OF RESULTS:  Otoscopy revealed clear canal and normal TM, bilaterally.    Tympanometry revealed normal TM mobility/middle ear function, bilaterally. .  Right ear : Type [x]A; []As, []B, []B-large volume, []C  Left ear: Type [x]A; []As, []B, []B-large volume, []C    Distortion Product Otoacoustic Emissions (DPOAEs) present suggesting normal cochlear outer hair cell function 2k-5k Hz, bilaterally.   Right ear: [x]2k, [x]3k, [x]4k, [x]5k Hz  Left ear: [x]2k, [x]3k, [x]4k, [x]5k Hz    Auditory Brainstem Response (ABR) testing revealed no response to click stimulus and 500 Hz in the right ear suggesting at least moderately severe to severe hearing loss  500-4k Hz. There was no response to click and response at 65 dBeHL in the left ear suggesting moderately-severe hearing loss 500 Hz to at least severe hearing loss 2k-4k Hz. Morphology and replication were very poor. Noise was high, Electrode placement Fz, Fpz, M1, M2. Impedance verified <5.   Right ear: click (NR 85 dBeHL); 500 (NR 65 dBeHL)  Left ear: click (NR 85 dBeHL); 500 (65 dBeHL)    IMPRESSIONS:   ABR would suggest hearing poorer than indicated by behavioral thresholds 2k-4k Hz/not in agreement with present DPOAEs. ABR may not be good predictor of hearing due to  patient's neurological status/patient noise during testing.    RECOMMENDATIONS:   Mother will call to schedule appointment with ENT for assessment.   Mother will pursue hearing aids at a private practice once she has secured financial assistance.     Results and recommendations were discussed with caregiver who verbalized understanding.   Today's results will be reported to PCP     Kalen Rondon CCC-A

## 2024-01-30 ENCOUNTER — LAB VISIT (OUTPATIENT)
Dept: LAB | Facility: HOSPITAL | Age: 39
End: 2024-01-30
Attending: NURSE PRACTITIONER
Payer: MEDICAID

## 2024-01-30 DIAGNOSIS — R30.0 DYSURIA: Primary | ICD-10-CM

## 2024-01-30 DIAGNOSIS — R30.0 DYSURIA: ICD-10-CM

## 2024-01-30 PROCEDURE — 87086 URINE CULTURE/COLONY COUNT: CPT

## 2024-02-02 ENCOUNTER — OFFICE VISIT (OUTPATIENT)
Dept: UROLOGY | Facility: CLINIC | Age: 39
End: 2024-02-02
Payer: MEDICAID

## 2024-02-02 ENCOUNTER — DOCUMENTATION ONLY (OUTPATIENT)
Dept: FAMILY MEDICINE | Facility: CLINIC | Age: 39
End: 2024-02-02
Payer: MEDICAID

## 2024-02-02 DIAGNOSIS — T83.511D URINARY TRACT INFECTION ASSOCIATED WITH INDWELLING URETHRAL CATHETER, SUBSEQUENT ENCOUNTER: Primary | ICD-10-CM

## 2024-02-02 DIAGNOSIS — N39.0 RECURRENT UTI: Primary | ICD-10-CM

## 2024-02-02 DIAGNOSIS — N39.0 URINARY TRACT INFECTION ASSOCIATED WITH INDWELLING URETHRAL CATHETER, SUBSEQUENT ENCOUNTER: Primary | ICD-10-CM

## 2024-02-02 LAB
BACTERIA UR CULT: ABNORMAL
BACTERIA UR CULT: ABNORMAL

## 2024-02-02 PROCEDURE — 4010F ACE/ARB THERAPY RXD/TAKEN: CPT | Mod: CPTII,95,, | Performed by: NURSE PRACTITIONER

## 2024-02-02 PROCEDURE — 99442 PR PHYSICIAN TELEPHONE EVALUATION 11-20 MIN: CPT | Mod: 95,,, | Performed by: NURSE PRACTITIONER

## 2024-02-02 PROCEDURE — 1160F RVW MEDS BY RX/DR IN RCRD: CPT | Mod: CPTII,95,, | Performed by: NURSE PRACTITIONER

## 2024-02-02 PROCEDURE — 1159F MED LIST DOCD IN RCRD: CPT | Mod: CPTII,95,, | Performed by: NURSE PRACTITIONER

## 2024-02-02 RX ORDER — CEFDINIR 300 MG/1
300 CAPSULE ORAL 2 TIMES DAILY
Qty: 20 CAPSULE | Refills: 0 | Status: SHIPPED | OUTPATIENT
Start: 2024-02-02 | End: 2024-02-12

## 2024-02-02 NOTE — PROGRESS NOTES
Chart reviewed, pt has [facility]  And [facility], HH recert papers signed (recerts had not been coming to us, had wrong MD and faxes for months had HH correct info and all signed today)

## 2024-02-02 NOTE — PROGRESS NOTES
Established Patient - Audio Only Telehealth Visit     The patient location is: home  The chief complaint leading to consultation is:  Foul-smelling urine   Visit type: Virtual visit with audio only (telephone)  Total time spent with patient:  25  minutes     The reason for the audio only service rather than synchronous audio and video virtual visit was related to technical difficulties or patient preference/necessity.     Each patient to whom I provide medical services by telemedicine is:  (1) informed of the relationship between the physician and patient and the respective role of any other health care provider with respect to management of the patient; and (2) notified that they may decline to receive medical services by telemedicine and may withdraw from such care at any time. Patient verbally consented to receive this service via voice-only telephone call.     This service was not originating from a related E/M service provided within the previous 7 days nor will  to an E/M service or procedure within the next 24 hours or my soonest available appointment.  Prevailing standard of care was able to be met in this audio-only visit.      Chief Complaint:  Foul-smelling urine    HPI:  Patient is a 38-year-old male on his virtual visit for foul-smelling urine and culture and sensitivity results along with treatment regimen.  Today I am speaking to patient's mother who is deemed his chart allow to receive all medical information patient complaining of small foul-smelling urine and a urine culture and sensitivity grew out Klebsiella pneumoniae and Pseudomonas.  Patient does have an indwelling Shetty catheter for several years now and does have frequent UTIs with culture bacteria results.  Discussed with patient's mother at length regarding patient's culture and sensitivity results and that due to his frequent UTIs the culture and sensitivity recommendations for antibiotics were 4 generation cephalosporins and other  antibiotics which were all IV encompassing cefepime, ceftriaxone cefuroxime, gentamicin, meropenem, Mirapex him, tobramycin however I chose to go with a 3rd generation cephalosporin p.o. cefdinir 300 mg p.o. b.i.d. x7 days, repeat urine culture in 14 days to determine eradication of bacteria.    Allergies:  Review of patient's allergies indicates:   Allergen Reactions    Amoxicillin Other (See Comments)     Pt states does not know reaction (was a baby at that time)  Mother reports high fever       Medications:  Current Outpatient Medications   Medication Sig Dispense Refill    aspirin (ECOTRIN) 81 MG EC tablet Take 1 tablet (81 mg total) by mouth once daily.      brexpiprazole (REXULTI) 2 mg Tab Take 2 mg by mouth.      cefdinir (OMNICEF) 300 MG capsule Take 1 capsule (300 mg total) by mouth 2 (two) times daily. for 10 days 20 capsule 0    cyclobenzaprine (FLEXERIL) 10 MG tablet Take 10 mg by mouth.      diltiaZEM (DILACOR XR) 240 MG CDCR Take 1 capsule (240 mg total) by mouth 2 (two) times a day. 60 capsule 11    docusate sodium (COLACE) 100 MG capsule Take 100 mg by mouth 2 (two) times daily.      fluticasone (FLONASE) 50 mcg/actuation nasal spray 1 spray by Each Nare route 2 (two) times daily.       folic acid (FOLVITE) 1 MG tablet Take 1 mg by mouth once daily.      hydrOXYzine pamoate (VISTARIL) 25 MG Cap Take 25 mg by mouth 2 (two) times daily as needed.      levoFLOXacin (LEVAQUIN) 750 MG tablet Take 1 tablet (750 mg total) by mouth once daily. 7 tablet 0    losartan (COZAAR) 50 MG tablet Take 50 mg by mouth once daily.      metoprolol succinate (TOPROL-XL) 25 MG 24 hr tablet Take 100 mg by mouth once daily at 6am.      mirtazapine (REMERON) 30 MG tablet Take 1 tablet (30 mg total) by mouth every evening. 30 tablet 0    mupirocin (BACTROBAN) 2 % ointment SMARTSIG:sparingly Topical Twice Daily      nitrofurantoin (MACRODANTIN) 50 MG capsule Take 1 capsule (50 mg total) by mouth every evening. 90 capsule 3     ondansetron (ZOFRAN-ODT) 8 MG TbDL Take 8 mg by mouth 2 (two) times daily.      OXcarbazepine (TRILEPTAL) 600 MG Tab Take 1 tablet (600 mg total) by mouth 2 (two) times daily as needed. 60 tablet 1    pantoprazole (PROTONIX) 40 MG tablet Take 1 tablet (40 mg total) by mouth once daily. 30 tablet 3    phenazopyridine (PYRIDIUM) 200 MG tablet Take 200 mg by mouth daily as needed.      vortioxetine (TRINTELLIX) 20 mg Tab Take 20 mg by mouth.       No current facility-administered medications for this visit.       Review of Systems:  General: No fever, chills, fatigability, or weight loss.  Skin: No rashes, itching, or changes in color or texture of skin.  Chest: Denies MCDONALD, cyanosis, wheezing, cough, and sputum production.  Abdomen: Appetite fine. No weight loss. Denies diarrhea, abdominal pain, hematemesis, or blood in stool.  Musculoskeletal: No joint stiffness or swelling. Denies back pain.  : As above.  All other review of systems negative.    PMH:  Past Medical History:   Diagnosis Date    Primary hypertension 2023    Seizures        PSH:  Past Surgical History:   Procedure Laterality Date    WISDOM TOOTH EXTRACTION         FamHx:  Family History   Problem Relation Age of Onset    Hypertension Mother     Arrhythmia Father     Heart attack Father     Hypertension Father     Arrhythmia Sister     Heart attack Maternal Grandmother     Stroke Maternal Grandmother     Heart attack Maternal Grandfather        SocHx:  Social History     Socioeconomic History    Marital status: Single   Tobacco Use    Smoking status: Former     Current packs/day: 0.00     Average packs/day: 0.3 packs/day for 10.0 years (2.5 ttl pk-yrs)     Types: Cigarettes     Start date: 1/15/2010     Quit date: 3/25/2014     Years since quittin.8     Passive exposure: Current    Smokeless tobacco: Never   Substance and Sexual Activity    Alcohol use: No    Drug use: No       Physical Exam:  There were no vitals filed for this  visit.    Impression:  Recurrent UTI    Plan:  Instructed patient family member that we will start cefdinir 300 mg p.o. b.i.d. x7 days.  Repeat urine culture in 2 weeks and discuss results when completed.

## 2024-02-19 ENCOUNTER — OFFICE VISIT (OUTPATIENT)
Dept: UROLOGY | Facility: CLINIC | Age: 39
End: 2024-02-19
Payer: MEDICAID

## 2024-02-19 DIAGNOSIS — N39.0 RECURRENT UTI: Primary | ICD-10-CM

## 2024-02-19 PROCEDURE — 99442 PR PHYSICIAN TELEPHONE EVALUATION 11-20 MIN: CPT | Mod: 95,,, | Performed by: NURSE PRACTITIONER

## 2024-02-19 PROCEDURE — 1159F MED LIST DOCD IN RCRD: CPT | Mod: CPTII,95,, | Performed by: NURSE PRACTITIONER

## 2024-02-19 PROCEDURE — 4010F ACE/ARB THERAPY RXD/TAKEN: CPT | Mod: CPTII,95,, | Performed by: NURSE PRACTITIONER

## 2024-02-19 PROCEDURE — 1160F RVW MEDS BY RX/DR IN RCRD: CPT | Mod: CPTII,95,, | Performed by: NURSE PRACTITIONER

## 2024-02-19 NOTE — PROGRESS NOTES
Established Patient - Audio Only Telehealth Visit     The patient location is:  Home  The chief complaint leading to consultation is:  Chronic UTI  Visit type: Virtual visit with audio only (telephone)  Total time spent with patient:  25  minutes     The reason for the audio only service rather than synchronous audio virtual visit was related to technical difficulties or patient preference/necessity.     Each patient to whom I provide medical services by telemedicine is:  (1) informed of the relationship between the physician and patient and the respective role of any other health care provider with respect to management of the patient; and (2) notified that they may decline to receive medical services by telemedicine and may withdraw from such care at any time. Patient verbally consented to receive this service via voice-only telephone call.     This service was not originating from a related E/M service provided within the previous 7 days nor will  to an E/M service or procedure within the next 24 hours or my soonest available appointment.  Prevailing standard of care was able to be met in this audio-only visit.      Chief Complaint: No chief complaint on file.      HPI: Patient is a 38-year-old male on his virtual visit for chronic UTI.  Patient mother and caregiver recently called regarding patient's foul-smelling urine therefore a culture and sensitivity UA was ordered and treatment was rendered with cefdinir 300 mg p.o. b.i.d. x7 days patient is due to change Shetty and also have a repeat culture and sensitivity test for urine. Patient does have an indwelling Shetty catheter for several years now and does have frequent UTIs with culture bacteria results.  Discussed with patient's mother at length regarding patient's culture and sensitivity results and that due to his frequent UTIs the culture and sensitivity recommendations for antibiotics only if patient is having symptomatic UTI resulting in fever,  chills, body aches, septic symptoms, disorientation.  Patient recently was treated for UTI with the above mentioned medication cefdinir 300 mg p.o. b.i.d. x7 days however his culture and sensitivity required antibiotics which were all IV encompassing cefepime, ceftriaxone cefuroxime, gentamicin, meropenem, Mirapex. tobramycin however I chose to go with the above-mentioned treatment regimen due to mother did not want patient hospitalized and put on IV therapy.  Today patient's mother states he is still having thick gel like urine with out any foul-smelling and feels he needs to have further treatment I encouraged mother to have the home health changes Shetty and also check the urine at that time and we will determine a treatment regimen at that time and to remain a focus of only treating UTIs with symptomatic UTIs.  Allergies:  Review of patient's allergies indicates:   Allergen Reactions    Amoxicillin Other (See Comments)     Pt states does not know reaction (was a baby at that time)  Mother reports high fever       Medications:  Current Outpatient Medications   Medication Sig Dispense Refill    aspirin (ECOTRIN) 81 MG EC tablet Take 1 tablet (81 mg total) by mouth once daily.      brexpiprazole (REXULTI) 2 mg Tab Take 2 mg by mouth.      cyclobenzaprine (FLEXERIL) 10 MG tablet Take 10 mg by mouth.      diltiaZEM (DILACOR XR) 240 MG CDCR Take 1 capsule (240 mg total) by mouth 2 (two) times a day. 60 capsule 11    docusate sodium (COLACE) 100 MG capsule Take 100 mg by mouth 2 (two) times daily.      fluticasone (FLONASE) 50 mcg/actuation nasal spray 1 spray by Each Nare route 2 (two) times daily.       folic acid (FOLVITE) 1 MG tablet Take 1 mg by mouth once daily.      hydrOXYzine pamoate (VISTARIL) 25 MG Cap Take 25 mg by mouth 2 (two) times daily as needed.      levoFLOXacin (LEVAQUIN) 750 MG tablet Take 1 tablet (750 mg total) by mouth once daily. 7 tablet 0    losartan (COZAAR) 50 MG tablet Take 50 mg by mouth  once daily.      metoprolol succinate (TOPROL-XL) 25 MG 24 hr tablet Take 100 mg by mouth once daily at 6am.      mirtazapine (REMERON) 30 MG tablet Take 1 tablet (30 mg total) by mouth every evening. 30 tablet 0    mupirocin (BACTROBAN) 2 % ointment SMARTSIG:sparingly Topical Twice Daily      nitrofurantoin (MACRODANTIN) 50 MG capsule Take 1 capsule (50 mg total) by mouth every evening. 90 capsule 3    ondansetron (ZOFRAN-ODT) 8 MG TbDL Take 8 mg by mouth 2 (two) times daily.      OXcarbazepine (TRILEPTAL) 600 MG Tab Take 1 tablet (600 mg total) by mouth 2 (two) times daily as needed. 60 tablet 1    pantoprazole (PROTONIX) 40 MG tablet Take 1 tablet (40 mg total) by mouth once daily. 30 tablet 3    phenazopyridine (PYRIDIUM) 200 MG tablet Take 200 mg by mouth daily as needed.      vortioxetine (TRINTELLIX) 20 mg Tab Take 20 mg by mouth.       No current facility-administered medications for this visit.       Review of Systems:  General: No fever, chills, fatigability, or weight loss.  Skin: No rashes, itching, or changes in color or texture of skin.  Chest: Denies MCDONALD, cyanosis, wheezing, cough, and sputum production.  Abdomen: Appetite fine. No weight loss. Denies diarrhea, abdominal pain, hematemesis, or blood in stool.  Musculoskeletal: No joint stiffness or swelling. Denies back pain.  : As above.  All other review of systems negative.    PMH:  Past Medical History:   Diagnosis Date    Primary hypertension 6/6/2023    Seizures 2007       PSH:  Past Surgical History:   Procedure Laterality Date    WISDOM TOOTH EXTRACTION         FamHx:  Family History   Problem Relation Age of Onset    Hypertension Mother     Arrhythmia Father     Heart attack Father     Hypertension Father     Arrhythmia Sister     Heart attack Maternal Grandmother     Stroke Maternal Grandmother     Heart attack Maternal Grandfather        SocHx:  Social History     Socioeconomic History    Marital status: Single   Tobacco Use    Smoking  status: Former     Current packs/day: 0.00     Average packs/day: 0.3 packs/day for 10.0 years (2.5 ttl pk-yrs)     Types: Cigarettes     Start date: 1/15/2010     Quit date: 3/25/2014     Years since quittin.9     Passive exposure: Current    Smokeless tobacco: Never   Substance and Sexual Activity    Alcohol use: No    Drug use: No       Physical Exam:  There were no vitals filed for this visit.  General: A&Ox3, no apparent distress, no deformities  Neck: No masses, normal thyroid  Lungs: CTA griselda, no use of accessory muscles  Heart: RRR, no arrhythmias  Abdomen: Soft, NT, ND, no masses, no hernias, no hepatosplenomegaly  Lymphatic: Neck and groin nodes negative  Skin: The skin is warm and dry. No jaundice.  Ext: No c/c/e.          Impression:  Chronic UTI  Neurogenic bladder    Plan:  Instructed patient's mother caregiver to allow home health to change Shetty catheter and also check urine culture and sensitivity that time and only treat symptomatic urinary tract infections.

## 2024-02-21 ENCOUNTER — OFFICE VISIT (OUTPATIENT)
Dept: UROLOGY | Facility: CLINIC | Age: 39
End: 2024-02-21
Payer: MEDICAID

## 2024-02-21 VITALS
HEART RATE: 67 BPM | DIASTOLIC BLOOD PRESSURE: 86 MMHG | HEIGHT: 62 IN | BODY MASS INDEX: 42.14 KG/M2 | RESPIRATION RATE: 18 BRPM | WEIGHT: 229 LBS | SYSTOLIC BLOOD PRESSURE: 122 MMHG | OXYGEN SATURATION: 98 %

## 2024-02-21 DIAGNOSIS — Q64.70 ABNORMALITY OF URETHRAL MEATUS: ICD-10-CM

## 2024-02-21 DIAGNOSIS — N39.0 RECURRENT UTI: Primary | ICD-10-CM

## 2024-02-21 PROCEDURE — 3074F SYST BP LT 130 MM HG: CPT | Mod: CPTII,,, | Performed by: NURSE PRACTITIONER

## 2024-02-21 PROCEDURE — 1159F MED LIST DOCD IN RCRD: CPT | Mod: CPTII,,, | Performed by: NURSE PRACTITIONER

## 2024-02-21 PROCEDURE — 99499 UNLISTED E&M SERVICE: CPT | Mod: S$PBB,,, | Performed by: NURSE PRACTITIONER

## 2024-02-21 PROCEDURE — 4010F ACE/ARB THERAPY RXD/TAKEN: CPT | Mod: CPTII,,, | Performed by: NURSE PRACTITIONER

## 2024-02-21 PROCEDURE — 1160F RVW MEDS BY RX/DR IN RCRD: CPT | Mod: CPTII,,, | Performed by: NURSE PRACTITIONER

## 2024-02-21 PROCEDURE — 3008F BODY MASS INDEX DOCD: CPT | Mod: CPTII,,, | Performed by: NURSE PRACTITIONER

## 2024-02-21 PROCEDURE — 99215 OFFICE O/P EST HI 40 MIN: CPT | Mod: PBBFAC | Performed by: NURSE PRACTITIONER

## 2024-02-21 PROCEDURE — 3079F DIAST BP 80-89 MM HG: CPT | Mod: CPTII,,, | Performed by: NURSE PRACTITIONER

## 2024-02-21 NOTE — PROGRESS NOTES
Chief Complaint: No chief complaint on file.      HPI: Patient is a 38-year-old male here to discuss persistent chronic UTI.  Patient mother and caregiver recently called regarding patient's foul-smelling urine therefore a culture and sensitivity UA was ordered and treatment was rendered with cefdinir 300 mg p.o. b.i.d. x7 days, patient is due to change Shetty and also have a repeat culture and sensitivity test for urine. Patient does have an indwelling Shetty catheter for several years now and does have frequent UTIs with culture bacteria results.  Discussed with patient's mother at length regarding patient's culture and sensitivity results and that due to his frequent UTIs the culture and sensitivity recommendations for antibiotics only if patient is having symptomatic UTI resulting in fever, chills, body aches, septic symptoms, disorientation.  Patient recently was treated for UTI with the above mentioned medication cefdinir 300 mg p.o. b.i.d. x7 days however his culture and sensitivity required antibiotics which were all IV encompassing cefepime, ceftriaxone cefuroxime, gentamicin, meropenem, Mirapex. tobramycin however I chose to go with the above-mentioned treatment regimen due to mother did not want patient hospitalized and put on IV therapy.  Today patient presents with persistent milky urine and also meatal erosion, urine culture and sensitivity is in process and will notify patient is caregiver his mother of results when completed.  In the meantime patient's mother would like to have a consult to [place] for a suprapubic catheter due to meatal erosion.  I had a lengthy discussion with patient's mother regarding culture and sensitivity with UTIs with someone with an indwelling Shetty catheter we try not to treat with antibiotics unless patient has fever, chills, body aches.  She does complain of intermittent backaches however he is either a wheelchair or lying in bed majority of the  time.  Allergies:  Review of patient's allergies indicates:   Allergen Reactions    Amoxicillin Other (See Comments)     Pt states does not know reaction (was a baby at that time)  Mother reports high fever       Medications:  Current Outpatient Medications   Medication Sig Dispense Refill    aspirin (ECOTRIN) 81 MG EC tablet Take 1 tablet (81 mg total) by mouth once daily.      brexpiprazole (REXULTI) 2 mg Tab Take 2 mg by mouth.      cyclobenzaprine (FLEXERIL) 10 MG tablet Take 10 mg by mouth.      diltiaZEM (DILACOR XR) 240 MG CDCR Take 1 capsule (240 mg total) by mouth 2 (two) times a day. 60 capsule 11    docusate sodium (COLACE) 100 MG capsule Take 100 mg by mouth 2 (two) times daily.      fluticasone (FLONASE) 50 mcg/actuation nasal spray 1 spray by Each Nare route 2 (two) times daily.       folic acid (FOLVITE) 1 MG tablet Take 1 mg by mouth once daily.      hydrOXYzine pamoate (VISTARIL) 25 MG Cap Take 25 mg by mouth 2 (two) times daily as needed.      levoFLOXacin (LEVAQUIN) 750 MG tablet Take 1 tablet (750 mg total) by mouth once daily. 7 tablet 0    losartan (COZAAR) 50 MG tablet Take 50 mg by mouth once daily.      metoprolol succinate (TOPROL-XL) 25 MG 24 hr tablet Take 100 mg by mouth once daily at 6am.      mirtazapine (REMERON) 30 MG tablet Take 1 tablet (30 mg total) by mouth every evening. 30 tablet 0    mupirocin (BACTROBAN) 2 % ointment SMARTSIG:sparingly Topical Twice Daily      nitrofurantoin (MACRODANTIN) 50 MG capsule Take 1 capsule (50 mg total) by mouth every evening. 90 capsule 3    ondansetron (ZOFRAN-ODT) 8 MG TbDL Take 8 mg by mouth 2 (two) times daily.      OXcarbazepine (TRILEPTAL) 600 MG Tab Take 1 tablet (600 mg total) by mouth 2 (two) times daily as needed. 60 tablet 1    pantoprazole (PROTONIX) 40 MG tablet Take 1 tablet (40 mg total) by mouth once daily. 30 tablet 3    phenazopyridine (PYRIDIUM) 200 MG tablet Take 200 mg by mouth daily as needed.      vortioxetine (TRINTELLIX)  20 mg Tab Take 20 mg by mouth.       No current facility-administered medications for this visit.       Review of Systems:  General: No fever, chills, fatigability, or weight loss.  Skin: No rashes, itching, or changes in color or texture of skin.  Chest: Denies MCDONALD, cyanosis, wheezing, cough, and sputum production.  Abdomen: Appetite fine. No weight loss. Denies diarrhea, abdominal pain, hematemesis, or blood in stool.  Musculoskeletal: No joint stiffness or swelling. Denies back pain.  : As above.  All other review of systems negative.    PMH:  Past Medical History:   Diagnosis Date    Primary hypertension 2023    Seizures        PSH:  Past Surgical History:   Procedure Laterality Date    WISDOM TOOTH EXTRACTION         FamHx:  Family History   Problem Relation Age of Onset    Hypertension Mother     Arrhythmia Father     Heart attack Father     Hypertension Father     Arrhythmia Sister     Heart attack Maternal Grandmother     Stroke Maternal Grandmother     Heart attack Maternal Grandfather        SocHx:  Social History     Socioeconomic History    Marital status: Single   Tobacco Use    Smoking status: Former     Current packs/day: 0.00     Average packs/day: 0.3 packs/day for 10.0 years (2.5 ttl pk-yrs)     Types: Cigarettes     Start date: 1/15/2010     Quit date: 3/25/2014     Years since quittin.9     Passive exposure: Current    Smokeless tobacco: Never   Substance and Sexual Activity    Alcohol use: No    Drug use: No       Physical Exam:  There were no vitals filed for this visit.  General: A&Ox3, no apparent distress, no deformities  Neck: No masses, normal thyroid  Lungs: CTA griselda, no use of accessory muscles  Heart: RRR, no arrhythmias  Abdomen: Soft, NT, ND, no masses, no hernias, no hepatosplenomegaly  Lymphatic: Neck and groin nodes negative  Skin: The skin is warm and dry. No jaundice.  Ext: No c/c/e.    GUMale: Penis, with normal penile and scrotal skin. Meatus erosion approximately  25% no redness noted at this time.        Impression:  There are no diagnoses linked to this encounter.    Plan:  Will notify patient's caregiver of culture and sensitivity results when completed, referral to [place] for consult on suprapubic catheter due to meatal erosion.  Encouraged patient's caregiver to ensure good catheter care and hygiene and released tension on the meatus.  Instructed patient's caregiver to discontinue Macrobid for suppression therapy.

## 2024-02-21 NOTE — PROGRESS NOTES
Patient seen by Aleshia RIZO. Pt will RTC for upcoming appt. Patients mother will be notified of culture when its available. Pt eduction given both written and verbal.

## 2024-02-26 ENCOUNTER — OFFICE VISIT (OUTPATIENT)
Dept: UROLOGY | Facility: CLINIC | Age: 39
End: 2024-02-26
Payer: MEDICAID

## 2024-02-26 DIAGNOSIS — N39.0 RECURRENT UTI: Primary | ICD-10-CM

## 2024-02-26 PROCEDURE — 1159F MED LIST DOCD IN RCRD: CPT | Mod: CPTII,95,, | Performed by: NURSE PRACTITIONER

## 2024-02-26 PROCEDURE — 99499 UNLISTED E&M SERVICE: CPT | Mod: 95,,, | Performed by: NURSE PRACTITIONER

## 2024-02-26 PROCEDURE — 4010F ACE/ARB THERAPY RXD/TAKEN: CPT | Mod: CPTII,95,, | Performed by: NURSE PRACTITIONER

## 2024-02-26 PROCEDURE — 1160F RVW MEDS BY RX/DR IN RCRD: CPT | Mod: CPTII,95,, | Performed by: NURSE PRACTITIONER

## 2024-02-26 RX ORDER — CEFDINIR 300 MG/1
300 CAPSULE ORAL 2 TIMES DAILY
Qty: 20 CAPSULE | Refills: 0 | Status: SHIPPED | OUTPATIENT
Start: 2024-02-26 | End: 2024-03-07

## 2024-02-26 RX ORDER — CEFDINIR 300 MG/1
300 CAPSULE ORAL 2 TIMES DAILY
Qty: 20 CAPSULE | Refills: 0 | Status: SHIPPED | OUTPATIENT
Start: 2024-02-26 | End: 2024-02-26

## 2024-02-26 NOTE — PROGRESS NOTES
Established Patient - Audio Only Telehealth Visit     The patient location is:  Home  The chief complaint leading to consultation is:  Urine culture and sensitivity results  Visit type: Virtual visit with audio only (telephone)  Total time spent with patient:  25  minutes     The reason for the audio only service rather than synchronous audio visit was related to technical difficulties or patient preference/necessity.     Each patient to whom I provide medical services by telemedicine is:  (1) informed of the relationship between the physician and patient and the respective role of any other health care provider with respect to management of the patient; and (2) notified that they may decline to receive medical services by telemedicine and may withdraw from such care at any time. Patient verbally consented to receive this service via voice-only telephone call.     This service was not originating from a related E/M service provided within the previous 7 days nor will  to an E/M service or procedure within the next 24 hours or my soonest available appointment.  Prevailing standard of care was able to be met in this audio-only visit.  Chief Complaint:  UTI      HPI:  Spoke to patient's caregiver his mother regarding urinary tract infection associated with a positive bacteria culture and sensitivity.  Patient's caregiver here on his audio visit to discuss results of urine culture and sensitivity positive for Klebsiella pneumoniae and Pseudomonas.  Instructed patient's caregiver I will send cefdinir 300 mg p.o. b.i.d. 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  Allergies:  Review of patient's allergies indicates:   Allergen Reactions    Amoxicillin Other (See Comments)     Pt states does not know reaction (was a baby at that time)  Mother reports high fever  Metoprolol therapy  Monitor heart rate  Patient with history pacemaker placement    Hold Xarelto  NPO for surgery       Medications:  Current Outpatient Medications   Medication Sig Dispense Refill    aspirin (ECOTRIN) 81 MG EC tablet Take 1 tablet (81 mg total) by mouth once daily.      brexpiprazole (REXULTI) 2 mg Tab Take 2 mg by mouth.      cefdinir (OMNICEF) 300 MG capsule Take 1 capsule (300 mg total) by mouth 2 (two) times daily. for 10 days 20 capsule 0    cyclobenzaprine (FLEXERIL) 10 MG tablet Take 10 mg by mouth.      diltiaZEM (DILACOR XR) 240 MG CDCR Take 1 capsule (240 mg total) by mouth 2 (two) times a day. 60 capsule 11    docusate sodium (COLACE) 100 MG capsule Take 100 mg by mouth 2 (two) times daily.      fluticasone (FLONASE) 50 mcg/actuation nasal spray 1 spray by Each Nare route 2 (two) times daily.       folic acid (FOLVITE) 1 MG tablet Take 1 mg by mouth once daily.      hydrOXYzine pamoate (VISTARIL) 25 MG Cap Take 25 mg by mouth 2 (two) times daily as needed.      levoFLOXacin (LEVAQUIN) 750 MG tablet Take 1 tablet (750 mg total) by mouth once daily. 7 tablet 0    losartan (COZAAR) 50 MG tablet Take 50 mg by mouth once daily.      metoprolol succinate (TOPROL-XL) 25 MG 24 hr tablet Take 100 mg by mouth once daily at 6am.      mirtazapine (REMERON) 30 MG tablet Take 1 tablet (30 mg total) by mouth every evening. 30 tablet 0    mupirocin (BACTROBAN) 2 % ointment SMARTSIG:sparingly Topical Twice Daily      nitrofurantoin (MACRODANTIN) 50 MG capsule Take 1 capsule (50 mg total) by mouth every evening. 90 capsule 3    ondansetron (ZOFRAN-ODT) 8 MG TbDL Take 8 mg by mouth 2 (two) times daily.      OXcarbazepine (TRILEPTAL) 600 MG Tab Take 1 tablet (600 mg total) by mouth 2 (two) times daily as needed. 60 tablet 1    pantoprazole (PROTONIX) 40 MG tablet Take 1 tablet (40 mg total) by mouth once daily. 30 tablet 3    phenazopyridine (PYRIDIUM) 200 MG tablet Take 200 mg by mouth daily as needed.      vortioxetine (TRINTELLIX) 20 mg Tab Take 20 mg by mouth.       No current facility-administered medications for  this visit.       Review of Systems:  General: No fever, chills, fatigability, or weight loss.  Skin: No rashes, itching, or changes in color or texture of skin.  Chest: Denies MCDONALD, cyanosis, wheezing, cough, and sputum production.  Abdomen: Appetite fine. No weight loss. Denies diarrhea, abdominal pain, hematemesis, or blood in stool.  Musculoskeletal: No joint stiffness or swelling. Denies back pain.  : As above.  All other review of systems negative.    PMH:  Past Medical History:   Diagnosis Date    Primary hypertension 2023    Seizures        PSH:  Past Surgical History:   Procedure Laterality Date    WISDOM TOOTH EXTRACTION         FamHx:  Family History   Problem Relation Age of Onset    Hypertension Mother     Arrhythmia Father     Heart attack Father     Hypertension Father     Arrhythmia Sister     Heart attack Maternal Grandmother     Stroke Maternal Grandmother     Heart attack Maternal Grandfather        SocHx:  Social History     Socioeconomic History    Marital status: Single   Tobacco Use    Smoking status: Former     Current packs/day: 0.00     Average packs/day: 0.3 packs/day for 10.0 years (2.5 ttl pk-yrs)     Types: Cigarettes     Start date: 1/15/2010     Quit date: 3/25/2014     Years since quittin.9     Passive exposure: Current    Smokeless tobacco: Never   Substance and Sexual Activity    Alcohol use: No    Drug use: No       Physical Exam:  There were no vitals filed for this visit.        Impression:  1. Recurrent UTI  - cefdinir (OMNICEF) 300 MG capsule; Take 1 capsule (300 mg total) by mouth 2 (two) times daily. for 10 days  Dispense: 20 capsule; Refill: 0      Plan:  Instructed patient caregiver to start Omnicef 300 mg p.o. b.i.d. times 10 days.  Instructed patient caregiver to speak with [facility] and identify which physician will take on patient's case to place a suprapubic catheter due to penile erosion.

## 2024-03-08 ENCOUNTER — LAB VISIT (OUTPATIENT)
Dept: LAB | Facility: HOSPITAL | Age: 39
End: 2024-03-08
Attending: NURSE PRACTITIONER
Payer: MEDICAID

## 2024-03-08 DIAGNOSIS — N39.0 RECURRENT UTI: Primary | ICD-10-CM

## 2024-03-08 DIAGNOSIS — N39.0 RECURRENT UTI: ICD-10-CM

## 2024-03-08 PROCEDURE — 87086 URINE CULTURE/COLONY COUNT: CPT

## 2024-03-11 ENCOUNTER — OFFICE VISIT (OUTPATIENT)
Dept: UROLOGY | Facility: CLINIC | Age: 39
End: 2024-03-11
Payer: MEDICAID

## 2024-03-11 DIAGNOSIS — N48.89 PENILE EROSION: ICD-10-CM

## 2024-03-11 DIAGNOSIS — N39.0 RECURRENT UTI: Primary | ICD-10-CM

## 2024-03-11 LAB
BACTERIA UR CULT: ABNORMAL
BACTERIA UR CULT: ABNORMAL

## 2024-03-11 PROCEDURE — 99442 PR PHYSICIAN TELEPHONE EVALUATION 11-20 MIN: CPT | Mod: 95,,, | Performed by: NURSE PRACTITIONER

## 2024-03-11 PROCEDURE — 1160F RVW MEDS BY RX/DR IN RCRD: CPT | Mod: CPTII,95,, | Performed by: NURSE PRACTITIONER

## 2024-03-11 PROCEDURE — 1159F MED LIST DOCD IN RCRD: CPT | Mod: CPTII,95,, | Performed by: NURSE PRACTITIONER

## 2024-03-11 PROCEDURE — 4010F ACE/ARB THERAPY RXD/TAKEN: CPT | Mod: CPTII,95,, | Performed by: NURSE PRACTITIONER

## 2024-03-11 RX ORDER — CEFDINIR 300 MG/1
300 CAPSULE ORAL 2 TIMES DAILY
Qty: 20 CAPSULE | Refills: 0 | Status: SHIPPED | OUTPATIENT
Start: 2024-03-11 | End: 2024-03-11

## 2024-03-11 NOTE — PROGRESS NOTES
Established Patient - Audio Only Telehealth Visit     The patient location is:  Home  The chief complaint leading to consultation is:  Lab results  Visit type: Virtual visit with audio only (telephone)  Total time spent with patient:  20  minutes     The reason for the audio only service rather than synchronous audio virtual visit was related to technical difficulties or patient preference/necessity.     Each patient to whom I provide medical services by telemedicine is:  (1) informed of the relationship between the physician and patient and the respective role of any other health care provider with respect to management of the patient; and (2) notified that they may decline to receive medical services by telemedicine and may withdraw from such care at any time. Patient verbally consented to receive this service via voice-only telephone call.     This service was not originating from a related E/M service provided within the previous 7 days nor will  to an E/M service or procedure within the next 24 hours or my soonest available appointment.  Prevailing standard of care was able to be met in this audio-only visit.      Chief Complaint:  Lab results    HPI:  Patient is on this audio virtual visit for lab results of a culture and sensitivity to a recent abnormal UA, foul-smelling urine along with cloudy mucus.  Spoke to patient's caregiver his mother regarding urinary tract infection associated with a positive bacteria culture and sensitivity.  Patient's caregiver here on his audio visit to discuss results of urine culture and sensitivity positive for Klebsiella pneumoniae and Pseudomonas.  Instructed patient's caregiver I will send cefdinir 300 mg p.o. b.i.d. 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  Upon assessment the penile erosion is splitting by 25% down from the  the head of his meatus and head of the penis if it was possible to get a suprapubic catheter at a sooner date and be message appreciated due to patient's having persistent UTIs.      Allergies:  Review of patient's allergies indicates:   Allergen Reactions    Amoxicillin Other (See Comments)     Pt states does not know reaction (was a baby at that time)  Mother reports high fever       Medications:  Current Outpatient Medications   Medication Sig Dispense Refill    aspirin (ECOTRIN) 81 MG EC tablet Take 1 tablet (81 mg total) by mouth once daily.      brexpiprazole (REXULTI) 2 mg Tab Take 2 mg by mouth.      cefdinir (OMNICEF) 300 MG capsule Take 1 capsule (300 mg total) by mouth 2 (two) times daily. for 10 days 20 capsule 0    cyclobenzaprine (FLEXERIL) 10 MG tablet Take 10 mg by mouth.      diltiaZEM (DILACOR XR) 240 MG CDCR Take 1 capsule (240 mg total) by mouth 2 (two) times a day. 60 capsule 11    docusate sodium (COLACE) 100 MG capsule Take 100 mg by mouth 2 (two) times daily.      fluticasone (FLONASE) 50 mcg/actuation nasal spray 1 spray by Each Nare route 2 (two) times daily.       folic acid (FOLVITE) 1 MG tablet Take 1 mg by mouth once daily.      hydrOXYzine pamoate (VISTARIL) 25 MG Cap Take 25 mg by mouth 2 (two) times daily as needed.      levoFLOXacin (LEVAQUIN) 750 MG tablet Take 1 tablet (750 mg total) by mouth once daily. 7 tablet 0    losartan (COZAAR) 50 MG tablet Take 50 mg by mouth once daily.      metoprolol succinate (TOPROL-XL) 25 MG 24 hr tablet Take 100 mg by mouth once daily at 6am.      mirtazapine (REMERON) 30 MG tablet Take 1 tablet (30 mg total) by mouth every evening. 30 tablet 0    mupirocin (BACTROBAN) 2 % ointment SMARTSIG:sparingly Topical Twice Daily      nitrofurantoin (MACRODANTIN) 50 MG capsule Take 1 capsule (50 mg total) by mouth every evening. 90 capsule 3    ondansetron (ZOFRAN-ODT) 8 MG TbDL Take 8 mg by mouth 2 (two) times daily.      OXcarbazepine (TRILEPTAL) 600 MG  Tab Take 1 tablet (600 mg total) by mouth 2 (two) times daily as needed. 60 tablet 1    pantoprazole (PROTONIX) 40 MG tablet Take 1 tablet (40 mg total) by mouth once daily. 30 tablet 3    phenazopyridine (PYRIDIUM) 200 MG tablet Take 200 mg by mouth daily as needed.      vortioxetine (TRINTELLIX) 20 mg Tab Take 20 mg by mouth.       No current facility-administered medications for this visit.       Review of Systems:  General: No fever, chills, fatigability, or weight loss.  Skin: No rashes, itching, or changes in color or texture of skin.  Chest: Denies MCDONALD, cyanosis, wheezing, cough, and sputum production.  Abdomen: Appetite fine. No weight loss. Denies diarrhea, abdominal pain, hematemesis, or blood in stool.  Musculoskeletal: No joint stiffness or swelling. Denies back pain.  : As above.  All other review of systems negative.    PMH:  Past Medical History:   Diagnosis Date    Primary hypertension 2023    Seizures        PSH:  Past Surgical History:   Procedure Laterality Date    WISDOM TOOTH EXTRACTION         FamHx:  Family History   Problem Relation Age of Onset    Hypertension Mother     Arrhythmia Father     Heart attack Father     Hypertension Father     Arrhythmia Sister     Heart attack Maternal Grandmother     Stroke Maternal Grandmother     Heart attack Maternal Grandfather        SocHx:  Social History     Socioeconomic History    Marital status: Single   Tobacco Use    Smoking status: Former     Current packs/day: 0.00     Average packs/day: 0.3 packs/day for 10.0 years (2.5 ttl pk-yrs)     Types: Cigarettes     Start date: 1/15/2010     Quit date: 3/25/2014     Years since quittin.9     Passive exposure: Current    Smokeless tobacco: Never   Substance and Sexual Activity    Alcohol use: No    Drug use: No       Physical Exam:  There were no vitals filed for this visit.        Impression:  Recurrent UTI  Penile erosion  Referral for suprapubic catheter insertion    Plan:  Instructed  patient caregiver we will refer patient to [facility] for a suprapubic catheter insertion due penile erosion.  Instructed patient caregiver sending cefdinir 300 mg p.o. b.i.d. x7 days for recurrent UTI.  Follow up with Urology at [facility] regarding patient's frequent UTI in correlation with possible colonization.

## 2024-03-12 DIAGNOSIS — G11.11 FRIEDREICH ATAXIA: ICD-10-CM

## 2024-03-13 RX ORDER — OXCARBAZEPINE 600 MG/1
600 TABLET, FILM COATED ORAL 2 TIMES DAILY PRN
Qty: 60 TABLET | Refills: 1 | Status: SHIPPED | OUTPATIENT
Start: 2024-03-13 | End: 2024-05-13 | Stop reason: SDUPTHER

## 2024-03-22 ENCOUNTER — OFFICE VISIT (OUTPATIENT)
Dept: OTOLARYNGOLOGY | Facility: CLINIC | Age: 39
End: 2024-03-22
Payer: MEDICAID

## 2024-03-22 VITALS — HEART RATE: 72 BPM | TEMPERATURE: 98 F | DIASTOLIC BLOOD PRESSURE: 76 MMHG | SYSTOLIC BLOOD PRESSURE: 114 MMHG

## 2024-03-22 DIAGNOSIS — M79.18 MYOFASCIAL PAIN: ICD-10-CM

## 2024-03-22 DIAGNOSIS — H61.23 BILATERAL IMPACTED CERUMEN: ICD-10-CM

## 2024-03-22 DIAGNOSIS — H91.90 HEARING LOSS, UNSPECIFIED HEARING LOSS TYPE, UNSPECIFIED LATERALITY: ICD-10-CM

## 2024-03-22 DIAGNOSIS — J30.9 ALLERGIC RHINITIS, UNSPECIFIED SEASONALITY, UNSPECIFIED TRIGGER: ICD-10-CM

## 2024-03-22 DIAGNOSIS — G47.33 OSA (OBSTRUCTIVE SLEEP APNEA): Primary | ICD-10-CM

## 2024-03-22 PROCEDURE — 3074F SYST BP LT 130 MM HG: CPT | Mod: CPTII,,, | Performed by: NURSE PRACTITIONER

## 2024-03-22 PROCEDURE — 69210 REMOVE IMPACTED EAR WAX UNI: CPT | Mod: PBBFAC | Performed by: NURSE PRACTITIONER

## 2024-03-22 PROCEDURE — 99214 OFFICE O/P EST MOD 30 MIN: CPT | Mod: PBBFAC | Performed by: NURSE PRACTITIONER

## 2024-03-22 PROCEDURE — 1159F MED LIST DOCD IN RCRD: CPT | Mod: CPTII,,, | Performed by: NURSE PRACTITIONER

## 2024-03-22 PROCEDURE — 99203 OFFICE O/P NEW LOW 30 MIN: CPT | Mod: 25,S$PBB,, | Performed by: NURSE PRACTITIONER

## 2024-03-22 PROCEDURE — 69210 REMOVE IMPACTED EAR WAX UNI: CPT | Mod: S$PBB,,, | Performed by: NURSE PRACTITIONER

## 2024-03-22 PROCEDURE — 3078F DIAST BP <80 MM HG: CPT | Mod: CPTII,,, | Performed by: NURSE PRACTITIONER

## 2024-03-22 PROCEDURE — 4010F ACE/ARB THERAPY RXD/TAKEN: CPT | Mod: CPTII,,, | Performed by: NURSE PRACTITIONER

## 2024-03-22 RX ORDER — FLUTICASONE PROPIONATE 50 MCG
2 SPRAY, SUSPENSION (ML) NASAL 2 TIMES DAILY
Qty: 16 G | Refills: 11 | Status: SHIPPED | OUTPATIENT
Start: 2024-03-22

## 2024-03-22 NOTE — PROGRESS NOTES
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 He and mother are unable to say when this began, but at least a few years ago. Anuj's mother states he had purchased some OTC aids when he lived in an apartment but they were misplaced. He has been living with her for the past 6mo. He did not have any trouble hearing when he was younger and does not have any hx of recurrent ear infections or otologic procedures/surgeries. He does have a hx of cerumen impactions and would like to have his ears cleaned if needed. Anuj occasionally complains of right ear discomfort. Has a hx of TMJ. Also has a hx of DENISSE but CPAP was recalled 2-3 years ago. Mom reports he has gained weight, especially over the past 6 months. She states he snores very loudly. Also has AR. Mom states he used to be on flonase but ran out.     ROS:   10-point review of systems negative except per HPI      Review of patient's allergies indicates:   Allergen Reactions    Amoxicillin Other (See Comments)     Pt states does not know reaction (was a baby at that time)  Mother reports high fever       Past Medical History:   Diagnosis Date    Primary hypertension 2023    Seizures        Past Surgical History:   Procedure Laterality Date    WISDOM TOOTH EXTRACTION         Social History     Socioeconomic History    Marital status: Single   Tobacco Use    Smoking status: Former     Current packs/day: 0.00     Average packs/day: 0.3 packs/day for 10.0 years (2.5 ttl pk-yrs)     Types: Cigarettes     Start date: 1/15/2010     Quit date: 3/25/2014     Years since quittin.9     Passive exposure: Current    Smokeless tobacco: Never   Substance and Sexual Activity    Alcohol use: No    Drug use: No       Family History   Problem Relation Age of Onset    Hypertension Mother     Arrhythmia  Father     Heart attack Father     Hypertension Father     Arrhythmia Sister     Heart attack Maternal Grandmother     Stroke Maternal Grandmother     Heart attack Maternal Grandfather        Outpatient Encounter Medications as of 2024   Medication Sig Dispense Refill    aspirin (ECOTRIN) 81 MG EC tablet Take 1 tablet (81 mg total) by mouth once daily.      brexpiprazole (REXULTI) 2 mg Tab Take 2 mg by mouth.      [] cefdinir (OMNICEF) 300 MG capsule Take 1 capsule (300 mg total) by mouth 2 (two) times daily. for 10 days 20 capsule 0    cyclobenzaprine (FLEXERIL) 10 MG tablet Take 10 mg by mouth.      diltiaZEM (DILACOR XR) 240 MG CDCR Take 1 capsule (240 mg total) by mouth 2 (two) times a day. 60 capsule 11    docusate sodium (COLACE) 100 MG capsule Take 100 mg by mouth 2 (two) times daily.      fluticasone (FLONASE) 50 mcg/actuation nasal spray 1 spray by Each Nare route 2 (two) times daily.       folic acid (FOLVITE) 1 MG tablet Take 1 mg by mouth once daily.      hydrOXYzine pamoate (VISTARIL) 25 MG Cap Take 25 mg by mouth 2 (two) times daily as needed.      levoFLOXacin (LEVAQUIN) 750 MG tablet Take 1 tablet (750 mg total) by mouth once daily. 7 tablet 0    losartan (COZAAR) 50 MG tablet Take 50 mg by mouth once daily.      metoprolol succinate (TOPROL-XL) 25 MG 24 hr tablet Take 100 mg by mouth once daily at 6am.      mirtazapine (REMERON) 30 MG tablet Take 1 tablet (30 mg total) by mouth every evening. 30 tablet 0    mupirocin (BACTROBAN) 2 % ointment SMARTSIG:sparingly Topical Twice Daily      nitrofurantoin (MACRODANTIN) 50 MG capsule Take 1 capsule (50 mg total) by mouth every evening. 90 capsule 3    ondansetron (ZOFRAN-ODT) 8 MG TbDL Take 8 mg by mouth 2 (two) times daily.      OXcarbazepine (TRILEPTAL) 600 MG Tab Take 1 tablet (600 mg total) by mouth 2 (two) times daily as needed. 60 tablet 1    pantoprazole (PROTONIX) 40 MG tablet Take 1 tablet (40 mg total) by mouth once daily. 30 tablet 3     phenazopyridine (PYRIDIUM) 200 MG tablet Take 200 mg by mouth daily as needed.      vortioxetine (TRINTELLIX) 20 mg Tab Take 20 mg by mouth.       No facility-administered encounter medications on file as of 2/22/2024.       Physical Exam:  There were no vitals filed for this visit.    Physical Exam   General: NAD, voice normal.   Neuro: Alert intermittently but also dosing off during visit, CN II - XII grossly intact  Head/ Face: NCAT, symmetric, sensations intact bilaterally  Eyes: EOMI, PERRL  Ears: externally normal with grossly normal hearing  AD: EAC with some dry cerumen in lateral canal- atraumatic removal with curette using operative otoscope, TM intact, no middle ear effusion, no retractions  AS: EAC with some dry cerumen in lateral canal- atraumatic removal with curette using operative otoscope, TM intact, no middle ear effusion, no retractions  Nose: bilateral nares patent, midline septum, no rhinorrhea, no external deformity, no turbinate hypertrophy  OC/OP: MMM, no intraoral lesions, no trismus, dentition is good, no uvular deviation, bilaterally symmetric soft palate elevation, palatoglossus and palatopharyngeal fold wnl; tonsils are symmetric and 1+  Indirect laryngoscopy: deferred due to patient intolerance  Neck: soft, supple, no LAD, normal ROM, no thyromegaly  Respiratory: nonlabored, no wheezing, bilateral chest rise  Cardiovascular: RRR  Gastrointestinal: S NT ND  Skin: warm, no lesions  Musculoskeletal: 5/5 strength  Psych: Appropriate affect/mood     Pertinent Data:  ? LABS:  ? AUDIO:             ? PATH:      Imaging:   I personally reviewed the following images:        Assessment/Plan:  38 y.o. male with b/l hearing loss and AR. B/l cerumen impaction removed today. ABR suggestive of poorer hearing than behavioral test, type A tymps.   Medically cleared for amplification.   - Mother plans to look into going through [facility] for aid vs switching to [facility] for amplification  - Baby oil prn for  ceruminolysis  - PSG  - Flonase BID  -   - RTC 3mo    Madison Fowler NP

## 2024-03-25 ENCOUNTER — OFFICE VISIT (OUTPATIENT)
Dept: UROLOGY | Facility: CLINIC | Age: 39
End: 2024-03-25
Payer: MEDICAID

## 2024-03-25 VITALS
HEIGHT: 62 IN | SYSTOLIC BLOOD PRESSURE: 130 MMHG | BODY MASS INDEX: 42.14 KG/M2 | RESPIRATION RATE: 20 BRPM | WEIGHT: 229 LBS | HEART RATE: 76 BPM | DIASTOLIC BLOOD PRESSURE: 89 MMHG | TEMPERATURE: 98 F | OXYGEN SATURATION: 95 %

## 2024-03-25 DIAGNOSIS — N48.89 PENILE EROSION: Primary | ICD-10-CM

## 2024-03-25 PROCEDURE — 99213 OFFICE O/P EST LOW 20 MIN: CPT | Mod: S$PBB,,, | Performed by: NURSE PRACTITIONER

## 2024-03-25 PROCEDURE — 1159F MED LIST DOCD IN RCRD: CPT | Mod: CPTII,,, | Performed by: NURSE PRACTITIONER

## 2024-03-25 PROCEDURE — 3079F DIAST BP 80-89 MM HG: CPT | Mod: CPTII,,, | Performed by: NURSE PRACTITIONER

## 2024-03-25 PROCEDURE — 3008F BODY MASS INDEX DOCD: CPT | Mod: CPTII,,, | Performed by: NURSE PRACTITIONER

## 2024-03-25 PROCEDURE — 99215 OFFICE O/P EST HI 40 MIN: CPT | Mod: PBBFAC | Performed by: NURSE PRACTITIONER

## 2024-03-25 PROCEDURE — 3075F SYST BP GE 130 - 139MM HG: CPT | Mod: CPTII,,, | Performed by: NURSE PRACTITIONER

## 2024-03-25 PROCEDURE — 4010F ACE/ARB THERAPY RXD/TAKEN: CPT | Mod: CPTII,,, | Performed by: NURSE PRACTITIONER

## 2024-03-25 PROCEDURE — 1160F RVW MEDS BY RX/DR IN RCRD: CPT | Mod: CPTII,,, | Performed by: NURSE PRACTITIONER

## 2024-03-25 NOTE — PROGRESS NOTES
Seen by SALLIE Monk RTC 6 mnths 3 referrals put in for suprapubic cath to be put in by [facility] urology. LVM for clinc to get apt

## 2024-03-25 NOTE — PROGRESS NOTES
Chief Complaint:   Chief Complaint   Patient presents with    Follow-up     No changes        HPI:  Patient is a 38-year-old male here for follow-up for recurrent UTI and penile erosion.  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  Patient's caregiver states he has never heard any word back from [facility].  Today patient's urine mildly discolored however he has no fever no altered mental status and no foul-smelling urine currently.  A 3rd referral was sent to [facility] for penile erosion and suprapubic catheter placement evaluation and treatment.    Allergies:  Review of patient's allergies indicates:   Allergen Reactions    Amoxicillin Other (See Comments)     Pt states does not know reaction (was a baby at that time)  Mother reports high fever       Medications:  Current Outpatient Medications   Medication Sig Dispense Refill    aspirin (ECOTRIN) 81 MG EC tablet Take 1 tablet (81 mg total) by mouth once daily.      brexpiprazole (REXULTI) 2 mg Tab Take 2 mg by mouth.      cyclobenzaprine (FLEXERIL) 10 MG tablet Take 10 mg by mouth.      diltiaZEM (DILACOR XR) 240 MG CDCR Take 1 capsule (240 mg total) by mouth 2 (two) times a day. 60 capsule 11    docusate sodium (COLACE) 100 MG capsule Take 100 mg by mouth 2 (two) times daily.      fluticasone propionate (FLONASE) 50 mcg/actuation nasal spray 2 sprays (100 mcg total) by Each Nostril route 2 (two) times daily. 16 g 11    folic acid (FOLVITE) 1 MG tablet Take 1 mg by mouth once daily.      hydrOXYzine pamoate (VISTARIL) 25 MG Cap Take 25 mg by mouth 2 (two) times daily as needed.      losartan (COZAAR) 50 MG tablet Take 50 mg by mouth once daily.      metoprolol succinate (TOPROL-XL) 25 MG 24 hr tablet Take 100 mg by mouth once daily at 6am.      mirtazapine (REMERON) 30 MG tablet  Take 1 tablet (30 mg total) by mouth every evening. 30 tablet 0    mupirocin (BACTROBAN) 2 % ointment SMARTSIG:sparingly Topical Twice Daily      ondansetron (ZOFRAN-ODT) 8 MG TbDL Take 8 mg by mouth 2 (two) times daily.      OXcarbazepine (TRILEPTAL) 600 MG Tab Take 1 tablet (600 mg total) by mouth 2 (two) times daily as needed. 60 tablet 1    pantoprazole (PROTONIX) 40 MG tablet Take 1 tablet (40 mg total) by mouth once daily. 30 tablet 3    phenazopyridine (PYRIDIUM) 200 MG tablet Take 200 mg by mouth daily as needed.      vortioxetine (TRINTELLIX) 20 mg Tab Take 20 mg by mouth.      levoFLOXacin (LEVAQUIN) 750 MG tablet Take 1 tablet (750 mg total) by mouth once daily. (Patient not taking: Reported on 3/22/2024) 7 tablet 0    nitrofurantoin (MACRODANTIN) 50 MG capsule Take 1 capsule (50 mg total) by mouth every evening. (Patient not taking: Reported on 3/22/2024) 90 capsule 3     No current facility-administered medications for this visit.       Review of Systems:  General: No fever, chills, fatigability, or weight loss.  Skin: No rashes, itching, or changes in color or texture of skin.  Chest: Denies MCDONALD, cyanosis, wheezing, cough, and sputum production.  Abdomen: Appetite fine. No weight loss. Denies diarrhea, abdominal pain, hematemesis, or blood in stool.  Musculoskeletal: No joint stiffness or swelling. Denies back pain.  : As above.  All other review of systems negative.    PMH:  Past Medical History:   Diagnosis Date    Primary hypertension 6/6/2023    Seizures 2007       PSH:  Past Surgical History:   Procedure Laterality Date    WISDOM TOOTH EXTRACTION         FamHx:  Family History   Problem Relation Age of Onset    Hypertension Mother     Arrhythmia Father     Heart attack Father     Hypertension Father     Arrhythmia Sister     Heart attack Maternal Grandmother     Stroke Maternal Grandmother     Heart attack Maternal Grandfather        SocHx:  Social History     Socioeconomic History    Marital  status: Single   Tobacco Use    Smoking status: Former     Current packs/day: 0.00     Average packs/day: 0.3 packs/day for 10.0 years (2.5 ttl pk-yrs)     Types: Cigarettes     Start date: 1/15/2010     Quit date: 3/25/2014     Years since quitting: 10.0     Passive exposure: Current    Smokeless tobacco: Never   Substance and Sexual Activity    Alcohol use: No    Drug use: No       Physical Exam:  Vitals:    03/25/24 1043   BP: 130/89   Pulse: 76   Resp: 20   Temp: 98.2 °F (36.8 °C)     General: A&Ox3, no apparent distress, no deformities  Neck: No masses, normal thyroid  Lungs: CTA griselda, no use of accessory muscles  Heart: RRR, no arrhythmias  Abdomen: Soft, NT, ND, no masses, no hernias, no hepatosplenomegaly  Lymphatic: Neck and groin nodes negative  Skin: The skin is warm and dry. No jaundice.  Ext: No c/c/e.    Male :Penis, with normal penile and scrotal skin. Meatus erosion approximately 25% no redness noted at this time.        Impression:  1. Penile erosion   2. Recurrent UTI    Plan:  Instructed patient caregiver a 3rd referral to [facility] for suprapubic catheter placement due to penile erosion sent.  RTC 6 months for re-evaluation.  Instructed caregiver to notify clinic if any abnormal urologic symptoms occur between now and next appointment.

## 2024-05-06 ENCOUNTER — TELEPHONE (OUTPATIENT)
Dept: FAMILY MEDICINE | Facility: CLINIC | Age: 39
End: 2024-05-06
Payer: MEDICAID

## 2024-05-06 PROBLEM — N39.0 RECURRENT UTI: Status: RESOLVED | Noted: 2024-02-02 | Resolved: 2024-05-06

## 2024-05-06 NOTE — TELEPHONE ENCOUNTER
Need a letter for patient mother to apply for a maxim[employer] for equipment for patient that is needed.need letter with Diagnosis and condition of patient.

## 2024-05-13 DIAGNOSIS — G11.11 FRIEDREICH ATAXIA: ICD-10-CM

## 2024-05-13 RX ORDER — PANTOPRAZOLE SODIUM 40 MG/1
40 TABLET, DELAYED RELEASE ORAL DAILY
Qty: 30 TABLET | Refills: 3 | Status: SHIPPED | OUTPATIENT
Start: 2024-05-13

## 2024-05-13 RX ORDER — OXCARBAZEPINE 600 MG/1
600 TABLET, FILM COATED ORAL 2 TIMES DAILY PRN
Qty: 60 TABLET | Refills: 1 | Status: SHIPPED | OUTPATIENT
Start: 2024-05-13 | End: 2024-07-12

## 2024-05-17 ENCOUNTER — DOCUMENTATION ONLY (OUTPATIENT)
Dept: FAMILY MEDICINE | Facility: CLINIC | Age: 39
End: 2024-05-17
Payer: MEDICAID

## 2024-05-17 NOTE — PROGRESS NOTES
Chart reviewed, pt has FU [facility]  And [facility] (5/2024), HH recert papers signed (recerts had not been coming to us, had wrong recert  signed today)

## 2024-05-31 ENCOUNTER — OFFICE VISIT (OUTPATIENT)
Dept: FAMILY MEDICINE | Facility: CLINIC | Age: 39
End: 2024-05-31
Payer: MEDICAID

## 2024-05-31 VITALS
SYSTOLIC BLOOD PRESSURE: 122 MMHG | HEART RATE: 79 BPM | RESPIRATION RATE: 18 BRPM | WEIGHT: 169 LBS | DIASTOLIC BLOOD PRESSURE: 87 MMHG | TEMPERATURE: 98 F | BODY MASS INDEX: 31.1 KG/M2 | HEIGHT: 62 IN

## 2024-05-31 DIAGNOSIS — R21 RASH AND NONSPECIFIC SKIN ERUPTION: Primary | ICD-10-CM

## 2024-05-31 DIAGNOSIS — G11.11 FRIEDREICH'S ATAXIA: ICD-10-CM

## 2024-05-31 DIAGNOSIS — Z97.8 CHRONIC INDWELLING FOLEY CATHETER: ICD-10-CM

## 2024-05-31 PROCEDURE — 99215 OFFICE O/P EST HI 40 MIN: CPT | Mod: PBBFAC | Performed by: STUDENT IN AN ORGANIZED HEALTH CARE EDUCATION/TRAINING PROGRAM

## 2024-05-31 RX ORDER — CYCLOBENZAPRINE HCL 10 MG
10 TABLET ORAL NIGHTLY
Qty: 90 TABLET | Refills: 0 | Status: SHIPPED | OUTPATIENT
Start: 2024-05-31

## 2024-05-31 RX ORDER — CETIRIZINE HYDROCHLORIDE 10 MG/1
10 TABLET ORAL DAILY
Qty: 30 TABLET | Refills: 0 | Status: SHIPPED | OUTPATIENT
Start: 2024-05-31 | End: 2024-06-30

## 2024-05-31 RX ORDER — DOXYCYCLINE HYCLATE 100 MG
100 TABLET ORAL 2 TIMES DAILY
Qty: 14 TABLET | Refills: 0 | Status: SHIPPED | OUTPATIENT
Start: 2024-05-31 | End: 2024-06-07

## 2024-05-31 NOTE — PROGRESS NOTES
Chief Complaint  Follow-up      History of Present Illness  Anuj Caldwell is a 38 y.o. year old male presents to the clinic accompanied by his mom for rash. Mom states rash started about 1.5 weeks ago, not able to identify a trigger. No new medications, no new foods, or detergent. Pt has been scratching all the affected areas. Mom also reports that pt is scheduled for a suprapubic catheter placement on June 5th with Dr. Chris Arnold at [facility]. He had pre-op labs done on 5/28/24 as well as UA, with evidence of infection, cultures pending.   Mom is requesting letter of medical necessity for skilled nursing hours along with prescritpion for skilled nursing fo8 hrs daily 7 days weekly        Review of Systems   Constitutional:  Negative for fever.   Respiratory:  Negative for shortness of breath.    Cardiovascular:  Negative for chest pain, palpitations and leg swelling.   Gastrointestinal:  Negative for constipation, diarrhea, nausea and vomiting.   Skin:  Positive for itching and rash.   Neurological:  Negative for headaches.         Physical Exam  Vitals and nursing note reviewed.   Constitutional:       General: He is not in acute distress.     Comments: In wheelchair   Eyes:      Conjunctiva/sclera: Conjunctivae normal.   Cardiovascular:      Rate and Rhythm: Normal rate and regular rhythm.      Heart sounds: Normal heart sounds.   Pulmonary:      Effort: Pulmonary effort is normal.      Breath sounds: Normal breath sounds.   Genitourinary:     Comments: Shetty catheter noted  Musculoskeletal:      Right lower leg: No edema.      Left lower leg: No edema.   Skin:     Comments: Diffuse erythematous macular rash with severe overlying excoriations involving bilateral lower and upper extremities as well as some lesions on the abdomen.   Mostly present on bilateral lower extremities around hair follicles with overlying crusting and some pus   Neurological:      General: No focal deficit present.    Psychiatric:         Mood and Affect: Mood normal.         Vitals:    05/31/24 1125   BP: 122/87   Pulse: 79   Resp: 18   Temp: 98 °F (36.7 °C)     Wt Readings from Last 2 Encounters:   05/31/24 76.7 kg (169 lb)   03/25/24 103.9 kg (229 lb)     Will need to discuss weight, unsure how was pt measured with or without wheelchair      Current Outpatient Medications  Current Outpatient Medications   Medication Instructions    aspirin (ECOTRIN) 81 mg, Oral, Daily    brexpiprazole (REXULTI) 2 mg, Oral    cetirizine (ZYRTEC) 10 mg, Oral, Daily    cyclobenzaprine (FLEXERIL) 10 mg, Oral, Nightly    diltiaZEM (DILACOR XR) 240 mg, Oral, 2 times daily    docusate sodium (COLACE) 100 mg, Oral, 2 times daily    doxycycline (VIBRA-TABS) 100 mg, Oral, 2 times daily    fluticasone propionate (FLONASE) 100 mcg, Each Nostril, 2 times daily    folic acid (FOLVITE) 1 mg, Oral, Daily    hydrOXYzine pamoate (VISTARIL) 25 mg, Oral, 2 times daily PRN    levoFLOXacin (LEVAQUIN) 750 mg, Oral, Daily    losartan (COZAAR) 50 mg, Oral, Daily    metoprolol succinate (TOPROL-XL) 100 mg, Oral, Daily    mirtazapine (REMERON) 30 mg, Oral, Nightly    mupirocin (BACTROBAN) 2 % ointment SMARTSIG:sparingly Topical Twice Daily    ondansetron (ZOFRAN-ODT) 8 mg, Oral, 2 times daily    OXcarbazepine (TRILEPTAL) 600 mg, Oral, 2 times daily PRN    pantoprazole (PROTONIX) 40 mg, Oral, Daily    phenazopyridine (PYRIDIUM) 200 mg, Oral, Daily PRN    vortioxetine (TRINTELLIX) 20 mg, Oral             Assessment / Plan:    1. Rash and nonspecific skin eruption  -will treat overlying folliculitis with doxycyline 100mg bid for 7 days  -recommended hydroxyzine qPM for itching at night, if that does not work, can try zyrtec  -will see pt again in 3-4 days to ensure rash is improving  -no fevers or systemic signs of infection  -would avoid steroids given pt is scheduled for surgery in 5 days    2. Friedreich's ataxia  3. Chronic indwelling Shetty catheter  - Ambulatory  referral/consult to Skilled Nursing; Future  -filled out form provided by pt along with order sent for skilled nursing (printed) and letter written stating medical necessity for the skilled nursing referral.         Follow up:    In 3-4 days for rash, or earlier if needed.     Noemi Martin M.D.  U  PGY-3

## 2024-05-31 NOTE — LETTER
May 31, 2024    Anuj TSANG Page  [address]  [place] [address] [facility]  [address]  [facility] [address]  Phone: [phone] To whom it may concern,     The above mentioned patient is under my care at [facility] for the diagnosis of Friedreich's ataxia. This letter is to serve confirming that this patient has the medical necessity for skilled nursing care 8 hours/day, 7 days weekly. He is wheelchair bound and currently has an indwelling Shetty catheter that is tentatively scheduled to be changed for a suprapubic catheter.         If you have any questions or concerns, please don't hesitate to call.    Sincerely,        Noemi Martin MD

## 2024-06-04 ENCOUNTER — OFFICE VISIT (OUTPATIENT)
Dept: FAMILY MEDICINE | Facility: CLINIC | Age: 39
End: 2024-06-04
Payer: MEDICAID

## 2024-06-04 VITALS
TEMPERATURE: 98 F | DIASTOLIC BLOOD PRESSURE: 82 MMHG | SYSTOLIC BLOOD PRESSURE: 118 MMHG | RESPIRATION RATE: 20 BRPM | OXYGEN SATURATION: 99 % | WEIGHT: 169 LBS | HEIGHT: 62 IN | BODY MASS INDEX: 31.1 KG/M2 | HEART RATE: 69 BPM

## 2024-06-04 DIAGNOSIS — R21 RASH AND NONSPECIFIC SKIN ERUPTION: ICD-10-CM

## 2024-06-04 DIAGNOSIS — G11.11 FRIEDREICH'S ATAXIA: Primary | ICD-10-CM

## 2024-06-04 PROCEDURE — 99215 OFFICE O/P EST HI 40 MIN: CPT | Mod: PBBFAC

## 2024-06-04 NOTE — PROGRESS NOTES
"[facility]    Subjective:      Chief Complaint: Rash and Follow-up    HPI   Anuj Caldwell is a 38 y.o. male who presents to [facility] for skin rash f/u. Initial presentation with rash on bilateral upper and lower extremities, appeared to have overlying folliculitis treated with doxycycline x7 days which patient has been taking, has 3 days remaining. Mom believes pruritus is due to giving peanut butter so she has been avoiding. No prior history of peanut allergy. Rash and pruritus near complete resolution. All detergents and lotions that mom uses are scent free and for sensitive skin. Denies systemic symptoms. Scheduled for suprapubic catheter placement tomorrow.     Review of Systems  As per HPI.    Objective:     /82 (BP Location: Right arm, Patient Position: Sitting, BP Method: X-Large (Automatic))   Pulse 69   Temp 98 °F (36.7 °C) (Oral)   Resp 20   Ht 5' 2" (1.575 m)   Wt 76.7 kg (169 lb)   SpO2 99%   BMI 30.91 kg/m²     Physical Exam:  Gen: No acute distress, seated in wheelchair due to chronic disability, accompanied by mother  CV: RRR, no murmurs  Resp: CTAB, breathing non labored on room air.  Skin: Excoriations on bilateral upper and lower extremities, appear to be well healing, rash from prior visit near complete resolution without pustules, only small punctate areas of erythema.       Current Outpatient Medications:     aspirin (ECOTRIN) 81 MG EC tablet, Take 1 tablet (81 mg total) by mouth once daily., Disp: , Rfl:     brexpiprazole (REXULTI) 2 mg Tab, Take 2 mg by mouth., Disp: , Rfl:     cetirizine (ZYRTEC) 10 MG tablet, Take 1 tablet (10 mg total) by mouth once daily., Disp: 30 tablet, Rfl: 0    cyclobenzaprine (FLEXERIL) 10 MG tablet, Take 1 tablet (10 mg total) by mouth every evening., Disp: 90 tablet, Rfl: 0    diltiaZEM (DILACOR XR) 240 MG CDCR, Take 1 capsule (240 mg total) by mouth 2 (two) times a day., Disp: 60 capsule, Rfl: 11    docusate sodium (COLACE) 100 MG " capsule, Take 100 mg by mouth 2 (two) times daily., Disp: , Rfl:     doxycycline (VIBRA-TABS) 100 MG tablet, Take 1 tablet (100 mg total) by mouth 2 (two) times daily. for 7 days, Disp: 14 tablet, Rfl: 0    fluticasone propionate (FLONASE) 50 mcg/actuation nasal spray, 2 sprays (100 mcg total) by Each Nostril route 2 (two) times daily., Disp: 16 g, Rfl: 11    folic acid (FOLVITE) 1 MG tablet, Take 1 mg by mouth once daily., Disp: , Rfl:     hydrOXYzine pamoate (VISTARIL) 25 MG Cap, Take 25 mg by mouth 2 (two) times daily as needed., Disp: , Rfl:     levoFLOXacin (LEVAQUIN) 750 MG tablet, Take 1 tablet (750 mg total) by mouth once daily. (Patient not taking: Reported on 3/22/2024), Disp: 7 tablet, Rfl: 0    losartan (COZAAR) 50 MG tablet, Take 50 mg by mouth once daily., Disp: , Rfl:     metoprolol succinate (TOPROL-XL) 25 MG 24 hr tablet, Take 100 mg by mouth once daily at 6am., Disp: , Rfl:     mirtazapine (REMERON) 30 MG tablet, Take 1 tablet (30 mg total) by mouth every evening., Disp: 30 tablet, Rfl: 0    mupirocin (BACTROBAN) 2 % ointment, SMARTSIG:sparingly Topical Twice Daily, Disp: , Rfl:     ondansetron (ZOFRAN-ODT) 8 MG TbDL, Take 8 mg by mouth 2 (two) times daily., Disp: , Rfl:     OXcarbazepine (TRILEPTAL) 600 MG Tab, Take 1 tablet (600 mg total) by mouth 2 (two) times daily as needed., Disp: 60 tablet, Rfl: 1    pantoprazole (PROTONIX) 40 MG tablet, Take 1 tablet (40 mg total) by mouth once daily., Disp: 30 tablet, Rfl: 3    phenazopyridine (PYRIDIUM) 200 MG tablet, Take 200 mg by mouth daily as needed., Disp: , Rfl:     vortioxetine (TRINTELLIX) 20 mg Tab, Take 20 mg by mouth., Disp: , Rfl:      Assessment/Plan:     Skin rash  Folliculitis  - Dermatology referral submitted for ongoing pruritus and recurrent folliculitis  - PRN hydroxyzine  - Continue using sensitive skin lotions and detergents, avoid scented products  - Complete 7 day course of doxycycline    Freidreich's ataxia  - Neurology referral  sent    Follow-up: 3 months for chronic medical problems    Thien Giordano MD   [employer] [facility] PGY-II

## 2024-06-05 ENCOUNTER — TELEPHONE (OUTPATIENT)
Dept: FAMILY MEDICINE | Facility: CLINIC | Age: 39
End: 2024-06-05

## 2024-06-05 NOTE — PROGRESS NOTES
I reviewed History, PE, A/P and medical record.  Services provided in outpatient department of a teaching hospital/facility, I was immediately available.  I agree with resident, care reasonable and necessary.   I evaluated the patient with resident at time of visit, participated in key parts of H/P and management was discussed.    Overall doing OK  Will try to arrange derm in few weeks  Has SP cath upcoming    Luli Mata MD  [facility]     Per omari message returning her call  Left message for patient at      Telephone Information:   Mobile [phone]    to schedule procedure.  Patient to return call to Angela BROWN [phone].

## 2024-06-17 ENCOUNTER — TELEPHONE (OUTPATIENT)
Dept: NEUROLOGY | Facility: CLINIC | Age: 39
End: 2024-06-17
Payer: MEDICAID

## 2024-06-17 NOTE — TELEPHONE ENCOUNTER
Good Morning    A neurology referral was received and per our neurology provider review, the patient needs a referral to Dr Sg [facility].     (P) [phone]   (F) [phone].    The referral to [facility] will be cancelled.     Thank you

## 2024-06-21 ENCOUNTER — OFFICE VISIT (OUTPATIENT)
Dept: FAMILY MEDICINE | Facility: CLINIC | Age: 39
End: 2024-06-21
Payer: MEDICAID

## 2024-06-21 VITALS
OXYGEN SATURATION: 95 % | HEIGHT: 62 IN | WEIGHT: 169 LBS | SYSTOLIC BLOOD PRESSURE: 112 MMHG | HEART RATE: 58 BPM | BODY MASS INDEX: 31.1 KG/M2 | TEMPERATURE: 98 F | DIASTOLIC BLOOD PRESSURE: 9 MMHG

## 2024-06-21 DIAGNOSIS — Z09 HOSPITAL DISCHARGE FOLLOW-UP: Primary | ICD-10-CM

## 2024-06-21 DIAGNOSIS — G11.11 FRIEDREICH'S ATAXIA: ICD-10-CM

## 2024-06-21 DIAGNOSIS — Z93.59 SUPRAPUBIC CATHETER: ICD-10-CM

## 2024-06-21 PROCEDURE — 99215 OFFICE O/P EST HI 40 MIN: CPT | Mod: PBBFAC

## 2024-06-21 NOTE — PROGRESS NOTES
"[facility]    Subjective:      Chief Complaint: HOSPITAL F/U    HPI   Anuj Caldwell is a 38 y.o. male who presents to [facility] for hospital discharge f/u. Had suprapubic catheter placed on 6/5/24. Subsequently developed a superficial infection surrounding the stoma and was admitted on 6/11/24. Treated with IV antibiotics and was discharged 3 days later on ciprofloxacin and doxycycline as well as topical antibiotics. He has a few days remaining of prescribed antibiotics and has no complaints of pain around catheter site. Erythema and inflammation has resolved. Mother reporting no drainage and back to baseline.    Notified mom that neurology referral to [facility] was denied because physician here does not treat patients condition. Advised to send referral to Dr. Simon Harman in [place]. Agreeable toreferral.     Review of Systems  As per [facility].    Objective:     BP (!) 112/9 (BP Location: Right arm, Patient Position: Sitting, BP Method: Large (Automatic))   Pulse (!) 58   Temp 98.2 °F (36.8 °C) (Oral)   Ht 5' 2" (1.575 m)   Wt 76.7 kg (169 lb)   SpO2 95%   BMI 30.91 kg/m²     Physical Exam:  Gen: No acute distress  CV: RRR, no murmurs.  Resp: CTAB, breathing non labored on room air.  Abd: Soft, non-distended, non-tender, bowel sounds normoactive. Suprapubic catheter in place, stoma c/d/I without surrounding erythema.      Current Outpatient Medications:     cetirizine (ZYRTEC) 10 MG tablet, Take 1 tablet (10 mg total) by mouth once daily., Disp: 30 tablet, Rfl: 0    aspirin (ECOTRIN) 81 MG EC tablet, Take 1 tablet (81 mg total) by mouth once daily., Disp: , Rfl:     brexpiprazole (REXULTI) 2 mg Tab, Take 2 mg by mouth., Disp: , Rfl:     cyclobenzaprine (FLEXERIL) 10 MG tablet, Take 1 tablet (10 mg total) by mouth every evening., Disp: 90 tablet, Rfl: 0    diltiaZEM (DILACOR XR) 240 MG CDCR, Take 1 capsule (240 mg total) by mouth 2 (two) times a day., Disp: 60 capsule, Rfl: 11    docusate sodium (COLACE) " 100 MG capsule, Take 100 mg by mouth 2 (two) times daily., Disp: , Rfl:     fluticasone propionate (FLONASE) 50 mcg/actuation nasal spray, 2 sprays (100 mcg total) by Each Nostril route 2 (two) times daily., Disp: 16 g, Rfl: 11    folic acid (FOLVITE) 1 MG tablet, Take 1 mg by mouth once daily., Disp: , Rfl:     hydrOXYzine pamoate (VISTARIL) 25 MG Cap, Take 25 mg by mouth 2 (two) times daily as needed., Disp: , Rfl:     levoFLOXacin (LEVAQUIN) 750 MG tablet, Take 1 tablet (750 mg total) by mouth once daily. (Patient not taking: Reported on 3/22/2024), Disp: 7 tablet, Rfl: 0    losartan (COZAAR) 50 MG tablet, Take 50 mg by mouth once daily., Disp: , Rfl:     metoprolol succinate (TOPROL-XL) 25 MG 24 hr tablet, Take 100 mg by mouth once daily at 6am., Disp: , Rfl:     mirtazapine (REMERON) 30 MG tablet, Take 1 tablet (30 mg total) by mouth every evening., Disp: 30 tablet, Rfl: 0    mupirocin (BACTROBAN) 2 % ointment, SMARTSIG:sparingly Topical Twice Daily, Disp: , Rfl:     ondansetron (ZOFRAN-ODT) 8 MG TbDL, Take 8 mg by mouth 2 (two) times daily., Disp: , Rfl:     OXcarbazepine (TRILEPTAL) 600 MG Tab, Take 1 tablet (600 mg total) by mouth 2 (two) times daily as needed., Disp: 60 tablet, Rfl: 1    pantoprazole (PROTONIX) 40 MG tablet, Take 1 tablet (40 mg total) by mouth once daily., Disp: 30 tablet, Rfl: 3    phenazopyridine (PYRIDIUM) 200 MG tablet, Take 200 mg by mouth daily as needed., Disp: , Rfl:     vortioxetine (TRINTELLIX) 20 mg Tab, Take 20 mg by mouth., Disp: , Rfl:   No current facility-administered medications for this visit.     Assessment/Plan:     Hospital discharge follow-up  Suprapubic catheter  Friedreich's ataxia  - Continue ciprofloxacin until completion of 10 day course  - Keep scheduled f/u with Urology  - Ambulatory referral/consult to Neurology; Future    Follow-up: 3 months with PCP    Thien Giordano MD   [facility] - PGY-II

## 2024-06-21 NOTE — PATIENT INSTRUCTIONS
Dermatology appointment 8/16/2024 at 08:00 at family medicine clinic [facility]

## 2024-07-15 DIAGNOSIS — G11.11 FRIEDREICH ATAXIA: ICD-10-CM

## 2024-07-16 ENCOUNTER — TELEPHONE (OUTPATIENT)
Dept: PEDIATRICS | Facility: CLINIC | Age: 39
End: 2024-07-16
Payer: MEDICAID

## 2024-07-16 RX ORDER — OXCARBAZEPINE 600 MG/1
600 TABLET, FILM COATED ORAL 2 TIMES DAILY PRN
Qty: 60 TABLET | Refills: 1 | Status: SHIPPED | OUTPATIENT
Start: 2024-07-16 | End: 2024-09-14

## 2024-07-16 NOTE — TELEPHONE ENCOUNTER
Trileptal refilled. I called mom @ [phone] to inform her. She will  medication.    Kang Merrill MD, [facility] Resident, [facility]  10:05 AM.

## 2024-08-15 DIAGNOSIS — G11.11 FRIEDREICH ATAXIA: ICD-10-CM

## 2024-08-15 RX ORDER — OXCARBAZEPINE 600 MG/1
600 TABLET, FILM COATED ORAL 2 TIMES DAILY PRN
Qty: 60 TABLET | Refills: 1 | Status: SHIPPED | OUTPATIENT
Start: 2024-08-15

## 2024-08-16 ENCOUNTER — TELEPHONE (OUTPATIENT)
Dept: FAMILY MEDICINE | Facility: CLINIC | Age: 39
End: 2024-08-16
Payer: MEDICAID

## 2024-08-16 ENCOUNTER — OFFICE VISIT (OUTPATIENT)
Dept: DERMATOLOGY | Facility: CLINIC | Age: 39
End: 2024-08-16
Payer: MEDICAID

## 2024-08-16 VITALS
OXYGEN SATURATION: 97 % | WEIGHT: 196 LBS | BODY MASS INDEX: 36.07 KG/M2 | DIASTOLIC BLOOD PRESSURE: 81 MMHG | HEIGHT: 62 IN | TEMPERATURE: 98 F | HEART RATE: 70 BPM | SYSTOLIC BLOOD PRESSURE: 122 MMHG

## 2024-08-16 DIAGNOSIS — L20.84 INTRINSIC ECZEMA: Primary | ICD-10-CM

## 2024-08-16 PROCEDURE — 99215 OFFICE O/P EST HI 40 MIN: CPT | Mod: PBBFAC | Performed by: DERMATOLOGY

## 2024-08-16 RX ORDER — TRIAMCINOLONE ACETONIDE 40 MG/ML
20 INJECTION, SUSPENSION INTRA-ARTICULAR; INTRAMUSCULAR
Status: DISCONTINUED | OUTPATIENT
Start: 2024-08-16 | End: 2024-08-16

## 2024-08-16 RX ORDER — TACROLIMUS 1 MG/G
OINTMENT TOPICAL 2 TIMES DAILY
Qty: 100 G | Refills: 1 | Status: SHIPPED | OUTPATIENT
Start: 2024-08-16

## 2024-08-16 RX ORDER — TRIAMCINOLONE ACETONIDE 40 MG/ML
20 INJECTION, SUSPENSION INTRA-ARTICULAR; INTRAMUSCULAR
Status: COMPLETED | OUTPATIENT
Start: 2024-08-16 | End: 2024-08-16

## 2024-08-16 RX ORDER — LIDOCAINE HYDROCHLORIDE 10 MG/ML
0.5 INJECTION, SOLUTION EPIDURAL; INFILTRATION; INTRACAUDAL; PERINEURAL
Status: DISCONTINUED | OUTPATIENT
Start: 2024-08-16 | End: 2024-08-16

## 2024-08-16 RX ORDER — MUPIROCIN 20 MG/G
OINTMENT TOPICAL DAILY
Qty: 30 G | Refills: 1 | Status: SHIPPED | OUTPATIENT
Start: 2024-08-16

## 2024-08-16 RX ORDER — TRIAMCINOLONE ACETONIDE 1 MG/G
CREAM TOPICAL 2 TIMES DAILY
Qty: 453.6 G | Refills: 1 | Status: SHIPPED | OUTPATIENT
Start: 2024-08-16

## 2024-08-16 RX ADMIN — TRIAMCINOLONE ACETONIDE 20 MG: 40 INJECTION, SUSPENSION INTRA-ARTICULAR; INTRAMUSCULAR at 09:08

## 2024-08-16 NOTE — PROGRESS NOTES
"[facility]    Subjective:      Chief Complaint: new patient and Rash (Pt's mom states x 3 months since pt had surgery he has broke out with a rash on bilateral legs, arms,and chest. A lot of itchiness. )      HPI   Anuj Caldwell is a 38 y.o. male with a hx of Enrique's ataxia who presents to [facility] for rash on exposed areas of bilateral upper and lower extremities for the past 2.5 months. He was initially treated with a 7-day course of doxycycline for folliculitis which is significantly improved from previously. He does have a few pustules scattered over upper and lower extremities but this does not appear to be the underlying diagnosis. Mom states that he is constantly scratching his arms and legs evidenced by the numerous excoriations at different stages of healing. He also has areas of splotchy erythema primarily on lateral upper arms and flexor surface of elbow that appears eczematous in nature. He has no personal history of eczema but mom and sisters all suffer with eczema. She does avoid hot tub baths and uses sensitive skin products including Vanicream and Dove soap. Denies recent dietary changes, changes in detergents, medication changes.     Review of Systems  As per HPI.    Objective:     /81 (BP Location: Right arm, Patient Position: Sitting, BP Method: Large (Automatic))   Pulse 70   Temp 98.2 °F (36.8 °C) (Oral)   Ht 5' 2" (1.575 m)   Wt 88.9 kg (196 lb)   SpO2 97%   BMI 35.85 kg/m²     Physical Exam:  Gen: No acute distress, accompanied by mother  Skin: numerous excoriations on exposed skin, splotchy erythematous patches over lateral upper arms eczematous appearing, few papules resembling folliculitis.      Current Outpatient Medications:     aspirin (ECOTRIN) 81 MG EC tablet, Take 1 tablet (81 mg total) by mouth once daily., Disp: , Rfl:     brexpiprazole (REXULTI) 2 mg Tab, Take 2 mg by mouth., Disp: , Rfl:     cetirizine (ZYRTEC) 10 MG tablet, Take 1 tablet (10 mg " total) by mouth once daily., Disp: 30 tablet, Rfl: 0    cyclobenzaprine (FLEXERIL) 10 MG tablet, Take 1 tablet (10 mg total) by mouth every evening., Disp: 90 tablet, Rfl: 0    diltiaZEM (DILACOR XR) 240 MG CDCR, Take 1 capsule (240 mg total) by mouth 2 (two) times a day., Disp: 60 capsule, Rfl: 11    docusate sodium (COLACE) 100 MG capsule, Take 100 mg by mouth 2 (two) times daily., Disp: , Rfl:     fluticasone propionate (FLONASE) 50 mcg/actuation nasal spray, 2 sprays (100 mcg total) by Each Nostril route 2 (two) times daily., Disp: 16 g, Rfl: 11    folic acid (FOLVITE) 1 MG tablet, Take 1 mg by mouth once daily., Disp: , Rfl:     hydrOXYzine pamoate (VISTARIL) 25 MG Cap, Take 25 mg by mouth 2 (two) times daily as needed., Disp: , Rfl:     levoFLOXacin (LEVAQUIN) 750 MG tablet, Take 1 tablet (750 mg total) by mouth once daily. (Patient not taking: Reported on 3/22/2024), Disp: 7 tablet, Rfl: 0    losartan (COZAAR) 50 MG tablet, Take 50 mg by mouth once daily., Disp: , Rfl:     metoprolol succinate (TOPROL-XL) 25 MG 24 hr tablet, Take 100 mg by mouth once daily at 6am., Disp: , Rfl:     mirtazapine (REMERON) 30 MG tablet, Take 1 tablet (30 mg total) by mouth every evening., Disp: 30 tablet, Rfl: 0    mupirocin (BACTROBAN) 2 % ointment, Apply topically once daily., Disp: 30 g, Rfl: 1    ondansetron (ZOFRAN-ODT) 8 MG TbDL, Take 8 mg by mouth 2 (two) times daily., Disp: , Rfl:     OXcarbazepine (TRILEPTAL) 600 MG Tab, Take 1 tablet (600 mg total) by mouth 2 (two) times daily as needed., Disp: 60 tablet, Rfl: 1    pantoprazole (PROTONIX) 40 MG tablet, Take 1 tablet (40 mg total) by mouth once daily., Disp: 30 tablet, Rfl: 3    phenazopyridine (PYRIDIUM) 200 MG tablet, Take 200 mg by mouth daily as needed., Disp: , Rfl:     tacrolimus (PROTOPIC) 0.1 % ointment, Apply topically 2 (two) times daily., Disp: 100 g, Rfl: 1    triamcinolone acetonide 0.1% (KENALOG) 0.1 % cream, Apply topically 2 (two) times daily., Disp:  453.6 g, Rfl: 1    vortioxetine (TRINTELLIX) 20 mg Tab, Take 20 mg by mouth., Disp: , Rfl:     Current Facility-Administered Medications:     triamcinolone acetonide injection 20 mg, 20 mg, Intramuscular, 1 time in Clinic/HOD,      Assessment/Plan:     Intrinsic eczema  - Continue to use sensitive skin products and unscented detergents  - Kenalog 20 mg IM administered today  - Kenalog 0.1% cream topically BID to eczema affected areas  - Tacrolimus 0.1% ointment BID  - Bactroban 2% ointment to pustules, avoid picking  - Consider Dupixent if fails to improve with above regimen    Follow-up: 1 month for eczema    Thien Giordano MD   [facility] - PGY-III

## 2024-09-13 ENCOUNTER — OFFICE VISIT (OUTPATIENT)
Dept: DERMATOLOGY | Facility: CLINIC | Age: 39
End: 2024-09-13
Payer: MEDICAID

## 2024-09-13 VITALS
OXYGEN SATURATION: 96 % | RESPIRATION RATE: 18 BRPM | HEART RATE: 75 BPM | DIASTOLIC BLOOD PRESSURE: 88 MMHG | TEMPERATURE: 98 F | SYSTOLIC BLOOD PRESSURE: 124 MMHG

## 2024-09-13 DIAGNOSIS — L02.612 ABSCESS OF LEFT FOOT: Primary | ICD-10-CM

## 2024-09-13 DIAGNOSIS — L20.84 INTRINSIC ECZEMA: ICD-10-CM

## 2024-09-13 PROCEDURE — 99214 OFFICE O/P EST MOD 30 MIN: CPT | Mod: PBBFAC | Performed by: DERMATOLOGY

## 2024-09-13 RX ORDER — MUPIROCIN 20 MG/G
OINTMENT TOPICAL 2 TIMES DAILY
Qty: 30 G | Refills: 1 | Status: SHIPPED | OUTPATIENT
Start: 2024-09-13

## 2024-09-13 NOTE — PROGRESS NOTES
[facility] DERMATOLOGY OFFICE VISIT NOTE  Anuj Caldwell  [id number]  09/13/2024    Chief Complaint: Recurrent Skin Infections (Left foot) and Follow-up    HPI:  Anuj Caldwell is a 38 y.o. male  presenting to [facility] for f/u of intrinsic eczema and recurrent skin infections. Hx of Enrique's ataxia. Was seen in ED on 8/29/24 for recurrent left wound/abscess with erythema and swelling for 10 days. Completed 7-day course of doxycycline prescribed following urgent care visi, but symptoms worsened. Some purulent drainage reported by mother. I&D performed in ED and wound was packed with quarter inch iodoform and wound culture was obtained, but not run. Was given Clindamycin 150 mg BID for 10 days and Diflucan 200 mg for 7 days. Mother reports that doxycycline was also started at the ED for an additional 10 days, but it is not listed in the note.     Last seen in clinic on 8/16/24 for a rash on B/L legs, arms, and chest for 2.5 months. Was found to have intrinsic eczema and given a Kenalog 20 mg IM injection in clinic. Started on Kenalog cream BID to eczema affected areas, Tacrolimus ointment BID, and Bactroban to pustules following visit. Reports doing better. Insurance did not cover Tacrolimus,  doing well on just the kenalog cream. Using as needed and using Cerevae lotion.     ROS:  Gen: denies fever or chills  Cardio: denies chest pain  Resp: denies shortness of breath  Skin: as per HPI    PE:  Vitals:    09/13/24 1014   BP: 124/88   Pulse: 75   Resp: 18   Temp: 98.3 °F (36.8 °C)     Gen: alert, no acute distress  Skin: Warm, dry. Well healed since last visit. Dorsal aspect of left foot has healing abscess following I&D, with small opening. Some swelling, no erythema or warmth.  Psych: cooperative, appropriate mood and affect    Assessment:   1. Abscess of left foot    2. Intrinsic eczema        Plan:  - Refilled Bactroban ointment for topical application to healing foot abscess  - Continue Kenalog cream as  needed for eczema affected areas  - Continue to use sensitive skin products and unscented detergents  - Avoid taking long, hot showers or baths  - Consider Dupixent if eczema returns or worsens      Return to clinic PRN for f/u    Patricia Shaw DO  [facility], -I

## 2024-09-17 ENCOUNTER — OFFICE VISIT (OUTPATIENT)
Dept: FAMILY MEDICINE | Facility: CLINIC | Age: 39
End: 2024-09-17
Payer: MEDICAID

## 2024-09-17 VITALS
RESPIRATION RATE: 20 BRPM | TEMPERATURE: 97 F | WEIGHT: 196 LBS | BODY MASS INDEX: 36.07 KG/M2 | OXYGEN SATURATION: 95 % | SYSTOLIC BLOOD PRESSURE: 110 MMHG | DIASTOLIC BLOOD PRESSURE: 72 MMHG | HEIGHT: 62 IN | HEART RATE: 72 BPM

## 2024-09-17 DIAGNOSIS — I10 PRIMARY HYPERTENSION: ICD-10-CM

## 2024-09-17 DIAGNOSIS — K21.9 GASTROESOPHAGEAL REFLUX DISEASE, UNSPECIFIED WHETHER ESOPHAGITIS PRESENT: ICD-10-CM

## 2024-09-17 DIAGNOSIS — L02.612 ABSCESS OF LEFT FOOT: Primary | ICD-10-CM

## 2024-09-17 DIAGNOSIS — G11.11 FRIEDREICH ATAXIA: ICD-10-CM

## 2024-09-17 PROCEDURE — 99215 OFFICE O/P EST HI 40 MIN: CPT | Mod: PBBFAC

## 2024-09-17 RX ORDER — ONDANSETRON 8 MG/1
8 TABLET, ORALLY DISINTEGRATING ORAL 2 TIMES DAILY
Qty: 15 TABLET | Refills: 1 | Status: SHIPPED | OUTPATIENT
Start: 2024-09-17

## 2024-09-17 RX ORDER — PANTOPRAZOLE SODIUM 40 MG/1
40 TABLET, DELAYED RELEASE ORAL DAILY
Qty: 90 TABLET | Refills: 1 | Status: SHIPPED | OUTPATIENT
Start: 2024-09-17

## 2024-09-17 RX ORDER — OXCARBAZEPINE 600 MG/1
600 TABLET, FILM COATED ORAL 2 TIMES DAILY PRN
Qty: 60 TABLET | Refills: 1 | Status: SHIPPED | OUTPATIENT
Start: 2024-09-17

## 2024-09-17 RX ORDER — CYCLOBENZAPRINE HCL 10 MG
10 TABLET ORAL NIGHTLY
Qty: 90 TABLET | Refills: 0 | Status: SHIPPED | OUTPATIENT
Start: 2024-09-17

## 2024-09-17 RX ORDER — MUPIROCIN 20 MG/G
OINTMENT TOPICAL 2 TIMES DAILY
Qty: 30 G | Refills: 1 | Status: SHIPPED | OUTPATIENT
Start: 2024-09-17

## 2024-09-17 NOTE — PROGRESS NOTES
[facility] Note    Subjective:     Patient ID: Anuj Caldwell is a 38 y.o. male    Chief Complaint:   Chief Complaint   Patient presents with    Follow-up     3 month follow up. Mom wants pt's right foot checked    Medication Refill       HPI  Anuj Caldwell is a 38 y.o. male who presents for routine follow-up. Here today with his mother and his home nurse.     HTN  -BP at goal, SBP in 110's and 120's at home. 110/72 in office today.    Friedreich's ataxia  -wheelchair bound  -Follows with [facility], annual Echo    Left foot abscess:  -s/p Doxyclycine x 7 days  -Saw Dermatology, prescribed Bactroban topical ointment they were not able to   -Needs new Bactroban Rx sent     GERD:  -Reflux stable on protonix  -Swallowing fine, no choking spells  -Tolerates solids/mix has to be moist, grainy or foods he can aspirate avoided    Neurogenic bladder  -Suprapubic catheter: 2.5 hours June 5th, 2024.   -Cellulitis at site resolved  -Home health nurse takes care of patient      Review of Systems  As per HPI    Current Outpatient Medications   Medication Instructions    aspirin (ECOTRIN) 81 mg, Oral, Daily    brexpiprazole (REXULTI) 2 mg, Oral    cetirizine (ZYRTEC) 10 mg, Oral, Daily    cyclobenzaprine (FLEXERIL) 10 mg, Oral, Nightly    diltiaZEM (DILACOR XR) 240 mg, Oral, 2 times daily    docusate sodium (COLACE) 100 mg, Oral, 2 times daily    fluticasone propionate (FLONASE) 100 mcg, Each Nostril, 2 times daily    folic acid (FOLVITE) 1 mg, Oral, Daily    hydrOXYzine pamoate (VISTARIL) 25 mg, Oral, 2 times daily PRN    levoFLOXacin (LEVAQUIN) 750 mg, Oral, Daily    losartan (COZAAR) 50 mg, Oral, Daily    metoprolol succinate (TOPROL-XL) 100 mg, Oral, Daily    mirtazapine (REMERON) 30 mg, Oral, Nightly    mupirocin (BACTROBAN) 2 % ointment Topical (Top), 2 times daily    ondansetron (ZOFRAN-ODT) 8 mg, Oral, 2 times daily    OXcarbazepine (TRILEPTAL) 600 mg, Oral, 2 times daily PRN    pantoprazole (PROTONIX) 40  "mg, Oral, Daily    phenazopyridine (PYRIDIUM) 200 mg, Oral, Daily PRN    tacrolimus (PROTOPIC) 0.1 % ointment Topical (Top), 2 times daily    triamcinolone acetonide 0.1% (KENALOG) 0.1 % cream Topical (Top), 2 times daily    vortioxetine (TRINTELLIX) 20 mg, Oral       Objective:         8/16/2024     8:18 AM 9/13/2024    10:14 AM 9/17/2024     1:44 PM   Vitals - 1 value per visit   SYSTOLIC 122 124 110   DIASTOLIC 81 88 72   Pulse 70 75 72   Temp 98.2 °F (36.8 °C) 98.3 °F (36.8 °C) 97.2 °F (36.2 °C)   Resp  18 20   SPO2 97 % 96 % 95 %   Weight (lb) 196  196   Weight (kg) 88.905  88.905   Height 5' 2" (1.575 m)  5' 2" (1.575 m)   BMI (Calculated) 35.8  35.8   Pain Score Zero Zero Zero     Physical Exam  Gen: No acute distress, wheelchair-bound  CV: RRR, no murmurs. No LE edema.  Resp: CTAB, breathing non labored on room air.  Abd: Soft, non-distended, non-tender, bowel sounds normoactive, suprapubic catheter in place  MSK: significant spasticity present on all extremities  Skin: wounds to anterior left foot and left ankle healing appropriately, no discharge or drainage noted. Mild erythema noted at the left ankle.               Assessment & Plan     Assessment & Plan  1. Abscess of left foot    2. Friedreich ataxia    3. Primary hypertension    4. Gastroesophageal reflux disease, unspecified whether esophagitis present      Plan  -Wounds improving  -Bactroban topical ointment Rx sent  -Consider wound care referral if worsening/without resolution  -Keep future appointment with ENT, Urology, and GI  -BP at goal. Continue current antihypertensive regimen.  -Continue Protonix 40 mg q.d, refill sent  -Other medications refilled as shown below.      Health Maintenance  Orders Placed This Encounter    mupirocin (BACTROBAN) 2 % ointment    pantoprazole (PROTONIX) 40 MG tablet    ondansetron (ZOFRAN-ODT) 8 MG TbDL    OXcarbazepine (TRILEPTAL) 600 MG Tab    cyclobenzaprine (FLEXERIL) 10 MG tablet     Health Maintenance   Topic " Date Due    TETANUS VACCINE  08/14/2016    Lipid Panel  02/26/2026    Hepatitis C Screening  Completed     Future Appointments   Date Time Provider Department Center   9/25/2024 10:00 AM Dwain Monk NP [facility] UROLO [place]   10/18/2024 11:30 AM Madison Fowler FNP [facility] ENT [place] [facility]       RTC in 1 month for routine visit.    Kang Merrill MD, MBS  [facility] Resident, -II

## 2024-10-14 DIAGNOSIS — L02.612 ABSCESS OF LEFT FOOT: ICD-10-CM

## 2024-10-14 DIAGNOSIS — G11.11 FRIEDREICH ATAXIA: ICD-10-CM

## 2024-10-14 DIAGNOSIS — R11.0 NAUSEA: Primary | ICD-10-CM

## 2024-10-15 RX ORDER — OXCARBAZEPINE 600 MG/1
600 TABLET, FILM COATED ORAL 2 TIMES DAILY PRN
Qty: 60 TABLET | Refills: 1 | Status: SHIPPED | OUTPATIENT
Start: 2024-10-15

## 2024-10-15 RX ORDER — MUPIROCIN 20 MG/G
OINTMENT TOPICAL 2 TIMES DAILY
Qty: 30 G | Refills: 1 | Status: SHIPPED | OUTPATIENT
Start: 2024-10-15

## 2024-10-15 RX ORDER — ONDANSETRON 8 MG/1
8 TABLET, ORALLY DISINTEGRATING ORAL 2 TIMES DAILY
Qty: 15 TABLET | Refills: 1 | Status: SHIPPED | OUTPATIENT
Start: 2024-10-15

## 2024-10-18 ENCOUNTER — OFFICE VISIT (OUTPATIENT)
Dept: OTOLARYNGOLOGY | Facility: CLINIC | Age: 39
End: 2024-10-18
Payer: MEDICAID

## 2024-10-18 VITALS — TEMPERATURE: 99 F | HEART RATE: 78 BPM | DIASTOLIC BLOOD PRESSURE: 90 MMHG | SYSTOLIC BLOOD PRESSURE: 131 MMHG

## 2024-10-18 DIAGNOSIS — H91.90 HEARING LOSS, UNSPECIFIED HEARING LOSS TYPE, UNSPECIFIED LATERALITY: Primary | ICD-10-CM

## 2024-10-18 DIAGNOSIS — H61.22 IMPACTED CERUMEN OF LEFT EAR: ICD-10-CM

## 2024-10-18 PROCEDURE — 99214 OFFICE O/P EST MOD 30 MIN: CPT | Mod: PBBFAC | Performed by: NURSE PRACTITIONER

## 2024-10-18 NOTE — PROGRESS NOTES
[facility]  Otolaryngology Clinic Note    Anuj TSANG Page  YOB: 1985    Chief Complaint: hearing loss       HPI: 03/22/2024: 38 y.o. male PMH Friedreich's ataxia referred for hearing loss. He and mother are unable to say when this began, but at least a few years ago. Anuj's mother states he had purchased some OTC aids when he lived in an apartment but they were misplaced. He has been living with her for the past 6mo. He did not have any trouble hearing when he was younger and does not have any hx of recurrent ear infections or otologic procedures/surgeries. He does have a hx of cerumen impactions and would like to have his ears cleaned if needed. Anuj occasionally complains of right ear discomfort. Has a hx of TMJ. Also has a hx of DENISSE but CPAP was recalled 2-3 years ago. Mom reports he has gained weight, especially over the past 6 months. She states he snores very loudly. Also has AR. Mom states he used to be on flonase but ran out.     10/18/2024: Mom states she spoke with  & there is a special fund through which he can get hearing aids, he just needs a prescription. Has not been able to schedule PSG yet. Anuj has had a surgery for issues with his suprapubic catheter. Mom also states she would be unable to stay with Anuj for overnight PSG at the hospital, because she has a special needs daughter at home as well.     ROS:   10-point review of systems negative except per HPI      Review of patient's allergies indicates:   Allergen Reactions    Amoxicillin Other (See Comments)     Pt states does not know reaction (was a baby at that time)  Mother reports high fever       Past Medical History:   Diagnosis Date    Primary hypertension 6/6/2023    Seizures 2007       Past Surgical History:   Procedure Laterality Date    WISDOM TOOTH EXTRACTION         Social History     Socioeconomic History    Marital status: Single   Tobacco Use    Smoking status: Former     Current  packs/day: 0.00     Average packs/day: 0.3 packs/day for 10.0 years (2.5 ttl pk-yrs)     Types: Cigarettes     Start date: 1/15/2010     Quit date: 3/25/2014     Years since quittin.9     Passive exposure: Current    Smokeless tobacco: Never   Substance and Sexual Activity    Alcohol use: No    Drug use: No       Family History   Problem Relation Age of Onset    Hypertension Mother     Arrhythmia Father     Heart attack Father     Hypertension Father     Arrhythmia Sister     Heart attack Maternal Grandmother     Stroke Maternal Grandmother     Heart attack Maternal Grandfather        Outpatient Encounter Medications as of 2024   Medication Sig Dispense Refill    aspirin (ECOTRIN) 81 MG EC tablet Take 1 tablet (81 mg total) by mouth once daily.      brexpiprazole (REXULTI) 2 mg Tab Take 2 mg by mouth.      [] cefdinir (OMNICEF) 300 MG capsule Take 1 capsule (300 mg total) by mouth 2 (two) times daily. for 10 days 20 capsule 0    cyclobenzaprine (FLEXERIL) 10 MG tablet Take 10 mg by mouth.      diltiaZEM (DILACOR XR) 240 MG CDCR Take 1 capsule (240 mg total) by mouth 2 (two) times a day. 60 capsule 11    docusate sodium (COLACE) 100 MG capsule Take 100 mg by mouth 2 (two) times daily.      fluticasone (FLONASE) 50 mcg/actuation nasal spray 1 spray by Each Nare route 2 (two) times daily.       folic acid (FOLVITE) 1 MG tablet Take 1 mg by mouth once daily.      hydrOXYzine pamoate (VISTARIL) 25 MG Cap Take 25 mg by mouth 2 (two) times daily as needed.      levoFLOXacin (LEVAQUIN) 750 MG tablet Take 1 tablet (750 mg total) by mouth once daily. 7 tablet 0    losartan (COZAAR) 50 MG tablet Take 50 mg by mouth once daily.      metoprolol succinate (TOPROL-XL) 25 MG 24 hr tablet Take 100 mg by mouth once daily at 6am.      mirtazapine (REMERON) 30 MG tablet Take 1 tablet (30 mg total) by mouth every evening. 30 tablet 0    mupirocin (BACTROBAN) 2 % ointment SMARTSIG:sparingly Topical Twice Daily       nitrofurantoin (MACRODANTIN) 50 MG capsule Take 1 capsule (50 mg total) by mouth every evening. 90 capsule 3    ondansetron (ZOFRAN-ODT) 8 MG TbDL Take 8 mg by mouth 2 (two) times daily.      OXcarbazepine (TRILEPTAL) 600 MG Tab Take 1 tablet (600 mg total) by mouth 2 (two) times daily as needed. 60 tablet 1    pantoprazole (PROTONIX) 40 MG tablet Take 1 tablet (40 mg total) by mouth once daily. 30 tablet 3    phenazopyridine (PYRIDIUM) 200 MG tablet Take 200 mg by mouth daily as needed.      vortioxetine (TRINTELLIX) 20 mg Tab Take 20 mg by mouth.       No facility-administered encounter medications on file as of 2/22/2024.       Physical Exam:  There were no vitals filed for this visit.    Physical Exam   General: NAD, voice normal.   Neuro: Alert intermittently but also dosing off during visit, CN II - XII grossly intact  Head/ Face: NCAT, symmetric, sensations intact bilaterally  Eyes: EOMI, PERRL  Ears: externally normal with grossly normal hearing  AD: EAC clear, TM intact, no middle ear effusion, no retractions  AS: EAC with some dry cerumen in lateral canal- atraumatic removal with curette using operative otoscope, TM intact, no middle ear effusion, no retractions  Nose: bilateral nares patent, midline septum, no rhinorrhea, no external deformity, no turbinate hypertrophy  OC/OP: MMM, no intraoral lesions, no trismus, dentition is good, no uvular deviation, bilaterally symmetric soft palate elevation, palatoglossus and palatopharyngeal fold wnl; tonsils are symmetric and 1+  Indirect laryngoscopy: deferred due to patient intolerance  Neck: soft, supple, no LAD, normal ROM, no thyromegaly  Respiratory: nonlabored, no wheezing, bilateral chest rise  Cardiovascular: RRR  Gastrointestinal: S NT ND  Skin: warm, no lesions  Musculoskeletal: 5/5 strength  Psych: Appropriate affect/mood     Pertinent Data:  ? LABS:  ? AUDIO:             ? PATH:      Imaging:   I personally reviewed the following  images:        Assessment/Plan:  38 y.o. male with b/l hearing loss and AR. Left cerumen impaction removed today. ABR 1/2024 suggestive of poorer hearing than behavioral test, type A tymps.   Medically cleared for amplification.   - Will reach out to audiology regarding potential amplification options through [facility]  - Baby oil prn for ceruminolysis  - PSG (mom will ask about home study)  - Flonase BID  - Annual audio  - RTC 1yr    Madison Fowler NP

## 2024-10-22 ENCOUNTER — TELEPHONE (OUTPATIENT)
Dept: FAMILY MEDICINE | Facility: CLINIC | Age: 39
End: 2024-10-22
Payer: MEDICAID

## 2024-10-22 DIAGNOSIS — T17.308D CHOKING, SUBSEQUENT ENCOUNTER: Primary | ICD-10-CM

## 2024-10-22 NOTE — TELEPHONE ENCOUNTER
Izabela with home health call requesting swallowing test ,patient has been choking and coughing.  Thank you.

## 2024-10-22 NOTE — TELEPHONE ENCOUNTER
Patient's care giver called to reports recent episodes of choking with food. Have occurred previously in the past. I will order swallowing study with ST. Patient's care giver will be contacted to schedule test.    Kang Merrill MD, [facility] Resident, hospitals

## 2024-10-23 ENCOUNTER — TELEPHONE (OUTPATIENT)
Dept: AUDIOLOGY | Facility: HOSPITAL | Age: 39
End: 2024-10-23
Payer: MEDICAID

## 2024-10-23 NOTE — TELEPHONE ENCOUNTER
Spoke with patient's mother in regards to getting hearing aids. Patient's current insurance does not cover hearing aids for adults. Mother has received some special funding from a third party company, which we are not set up to collect. Mother has been advised to seek out some local private practices. Testing will most likely need to be done wherever he is receiving hearing aids. ABR that we have from 2024 may not be a good predictor of hearing due to patient's neurological status/patient noise that was present during testing.

## 2024-11-05 ENCOUNTER — CLINICAL SUPPORT (OUTPATIENT)
Dept: REHABILITATION | Facility: HOSPITAL | Age: 39
End: 2024-11-05
Payer: MEDICAID

## 2024-11-05 ENCOUNTER — TELEPHONE (OUTPATIENT)
Dept: FAMILY MEDICINE | Facility: CLINIC | Age: 39
End: 2024-11-05

## 2024-11-05 ENCOUNTER — HOSPITAL ENCOUNTER (OUTPATIENT)
Dept: RADIOLOGY | Facility: HOSPITAL | Age: 39
Discharge: HOME | End: 2024-11-05
Payer: MEDICAID

## 2024-11-05 DIAGNOSIS — T17.308D CHOKING, SUBSEQUENT ENCOUNTER: ICD-10-CM

## 2024-11-05 PROCEDURE — 74230 X-RAY XM SWLNG FUNCJ C+: CPT | Mod: TC

## 2024-11-05 PROCEDURE — 92611 MOTION FLUOROSCOPY/SWALLOW: CPT

## 2024-11-05 NOTE — PLAN OF CARE
[facility]  MODIFIED BARIUM SWALLOW STUDY  Outpatient    Date: 11/05/2024      Name: Anuj Caldwell   MRN: [id number]    Therapy Diagnosis: oropharyngeal dysphagia    Physician: Kang Merrill MD  Physician Orders: SLP Video Swallow  Medical Diagnosis from Referral:     Date of Evaluation:  11/05/2024    Time In:  1000  Time Out:  1100  Total Billable Time: 60m     Procedure Min.   Fl Modified Barium Swallow Speech  60     Precautions: Standard, Fall, and Aspiration    Recommendations:   Consistency Recommendations:  Thin (via Provale cup (controlled sip cup) liquids (IDDSI Soft/bite size) and   consistencies (IDDSI 6).  Medications should be taken whole in puree and crushed (when possible) in puree.   Precautions:supervision recommended, feed only when alert, sit as upright as possible, upright 30 minutes after meals, alternate food and liquid, decrease bite/drink size, frequent oral care, and slower rate during PO intake.  Risk Management: use good oral hygiene , sit upright for all PO intake, and increase physical mobility as tolerated  Specialist Referrals:   Ancillary Tests:   Therapy: Dysphagia therapy is not recommended at this time.  Frequency:   Follow-up exam: Follow up swallow study is not indicated at this time.    Subjective       Past Medical History: Anuj Caldwell  has a past surgical history that includes [place] tooth extraction.         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    S/P ablation of atrial fibrillation 11/20/2015    Neurogenic bladder 08/25/2022    Kidney stones 03/02/2023    History of fibromyalgia 06/06/2023    Epilepsy 06/06/2023    Gastroesophageal reflux disease 06/06/2023    Primary hypertension 06/06/2023    Schizophrenia 06/06/2023    Spasm 06/06/2023    Symptomatic cholelithiasis 11/26/2019    Major depressive  disorder with single episode 06/06/2023    Penile erosion 03/11/2024     Resolved Ambulatory Problems     Diagnosis Date Noted    S/P ablation of atrial flutter 11/20/2015    Depressive disorder 06/06/2023    Recurrent UTI 02/02/2024     Past Medical History:   Diagnosis Date    Seizures 2007      Medical Hx and Allergies:    Review of patient's allergies indicates:   Allergen Reactions    Amoxicillin Other (See Comments)     Pt states does not know reaction (was a baby at that time)  Mother reports high fever       Modified barium swallow study (MBSS) completed to assess swallow effectiveness, ID/rule out penetration and aspiration, make appropriate recommendations regarding safest diet consistency, effective compensatory strategies and safe eating environment.     The patient is a 39 y.o. male who complains of choking.     The patient's mother gave the following history:   -Current diet at home: Minced/honey thick liquids       The following observations were made:   -Mental status: Alert and Cooperative  -Factors affecting performance: impairment or difficulty noted in mental status and impairment or difficulty in following directions  -Feeding Method: dependent for feeding    Respiratory Status:   -Respiratory Status: room air      Pain Scale:  0/10 on VAS currently.   Pain Location: no pain reported      Objective   Cranial Nerve Examination  Cranial Nerve 5: Trigeminal Nerve  Motor Jaw Posture at rest: Closed  Mandible Elevation/Depression: DNT  Mandible lateralization:  DNT  Abnormal movement: absent Interpretation:   intact   Sensory Forehead: WFL  Cheek: WFL  Jaw: WFL  Facial Pain: None noted Interpretation:   intact     Cranial Nerve 7: Facial Nerve  Motor Facial Symmetry: WNL  Wrinkle Forehead: DNT  Close eyes tightly: WFL  Labial Protrusion: WFL  Labial Retraction: WFL  Abnormal movement: absent Interpretation:   intact   Sensory Formal testing not completed. Patient denied any changes in taste       Cranial Nerves IX and X: Glossopharyngeal and Vagus Nerves  Motor Palatal Symmetry (Rest): DNT  Palatal Symmetry (Movement): DNT  Cough: Perceptually strong  Voice Prior to PO intake: Clear  Resonance: Normal  Abnormal movement:  Interpretation:   intact     Cranial Nerve XII: Hypoglossal Nerve  Motor Tongue at rest: WNL  Lingual Protrusion: WNL  Lingual Protrusion against Resistance: WNL  Lingual Lateralization: WNL  Abnormal movement: absent Interpretation:   intact     Other information:  Volitional Swallow: Able to palpate laryngeal rise  Mucosal Quality: No abnormal findings  Secretion Management: Secretion Mgmt: adequate  Dentition: Good condition for speech and mastication     CONSISTENCIES ADMINISTERED:  Thin Liquids (IDDSI 0):  Mode: SLP fed   Oral Residue: trace    Vallecular Residue: none    Pyriform Sinus Residue: none    Rosenbeck's 8-Point Penetration-Aspiration Scale:  Best: (1) Material does not enter the airway  penetration or aspiration did not occur during the swallow with thin liquids via straw  Worst: (4) Material enters the airway, contacts the vocal folds, and is ejected from the airway  penetration did occur during the swallow with thin liquids via straw (consecutive sips)  Strategies attempted:   Barium Tablet: DNT    Mildly Thick Liquids (IDDSI 2):  Mode: SLP fed  Oral Residue: trace   Vallecular Residue: trace   Pyriform Sinus Residue: none   Rosenbeck's 8-Point Penetration-Aspiration Scale:   Best: (1) Material does not enter the airway  penetration or aspiration did not occur during the swallow with mildly thick liquids via straw  Worst: (2) Material enters the airway, remains above the vocal folds, and is ejected from the airway  penetration did occur during the swallow with mildly thick liquids via straw  Strategies attempted:  controlled sip rate  Barium Tablet:     Puree (IDDSI 4):  Mode: SLP fed  Oral Residue: none   Vallecular Residue: none   Pyriform Sinus Residue: none    Rosenbeck's 8-Point Penetration-Aspiration Scale:   Best: (1) Material does not enter the airway  penetration or aspiration did not occur during the swallow.    Strategies attempted:   Barium Tablet:     Mixed (Soft and Bite Sized) Consistency Bolus (IDDSI 6 with IDDSI 2):  Mode: SLP fed  Oral Residue: trace   Vallecular Residue: none   Pyriform Sinus Residue: none   Rosenbeck's 8-Point Penetration-Aspiration Scale:   Best: (1) Material does not enter the airway  penetration or aspiration did not occur during the swallow.    Strategies attempted:     Regular (IDDSI 7):  Mode: SLP fed  Oral Residue: mild   Vallecular Residue: trace   Pyriform Sinus Residue: none   Rosenbeck's 8-Point Penetration-Aspiration Scale:   Best: (1) Material does not enter the airway  penetration or aspiration did not occur during the swallow.    Strategies attempted:     Impression   Oral phase remarkable for  adequate bolus acceptance from utensils. Reduced oral control posteriorly appreciated which resulted in loss of bolus to the pharyngeal space. Reduced mastication timing and  range of motion resulted in unchewed bolus being swallowed.  No pocketing. Pharyngeal phase remarkable for reduced epiglottic closure resulting in penetration of thin liquids. Reduced bolus size and rate increased swallow safety with liquids. No aspiration appreciated with any consistencies during this study. Based on this assessment, patient appears safe to consume a modified PO diet with aspiration precautions/compensatory strategies. One compensatory strategy recommended was a controlled sip cup (Provale) for administration of thin liquids. Verbal understanding from parent given. Cognitive deficits can increase risk for aspiration, 1:1 assist with PO intake recommended.  Written and verbal instructions provided. No further skilled ST services indicated at this time.       Assessment     Preparatory Phase:     Patient Positioning:   Upright in radiology chair       Level of Assistance:   Total assistance      Regulation of Bolus:   SLP fed    Oral Phase:     Labial Seal:   no labial escape      Lingual Movement:   Lingual motion was c/b repetitive and disorganized tongue motion.    Bolus Acceptance:   WFL     Oral Manipulation:   Impaired     Mastication:   disorganized with solid pieces of bolus unchewed    Oral Transit:   Reduced oral control posteriorly      Oral Pocketing:   No    Oral Residue:   There was no significant oral residue.    Pharyngeal Phase:     Tongue Base Retraction:     Coating of contrast between base of tongue and posterior pharyngeal wall with some consistencies       Initiation of Pharyngeal Swallow:   Occurs when the bolus head is at the ramus of the mandible with some consistencies    Occurs when the bolus head reaches the valleculae with some consistencies       Epiglottic Closure:  Partial inversion      Laryngeal Elevation:   Partial elevation      Anterior Hyoid Excursion:   Partial excursion      Pharyngeal Contractions (A-P view only):   DNT     Pharyngeal Stripping Wave:   Present       Vallecular Residue:  None observed with most consistencies presented      Posterior Pharyngeal Wall Residue:   None observed with consistencies presented      Pyriform Sinus Residue:       Coating observed with some consistencies      Esophageal Phase:   DNT        Goals:     LONG TERM GOAL    Anuj Caldwell will tolerate Soft and Bite Sized (IDDSI Level 6) consistency diet with Thin (IDDSI Level 0) liquids (controlled sip cup) without overt s/sx of aspiration to maintain appropriate nutrition and hydration.  Initial         The Functional Oral Intake Scale (FOIS) is an ordinal scale that is used to assess the current status and meaningful change in the oral intake. FOIS levels include:        TUBE DEPENDENT   (Levels 1-3) 1. No oral intake    2. Tube dependent with minimal/inconsistent oral intake    3. Tube supplements with consistent oral intake           TOTAL ORAL INTAKE (Levels 4-7) 4. Total oral intake of a single consistency    5. Total oral intake of multiple consistencies requiring special preparation    6. Total oral intake with no special preparation, but must avoid specific foods or liquid items    7. Total oral intake with no restrictions   Patient is currently judged to be at FOIS Level 6          *AMANDA Wahl (2005, March). Pneumonia: Factors Beyond Aspiration. Perspectives in Swallowing and Swallowing Disorders (Dysphagia), 14, 10-16.  Education   Education: Results of the study, Aspiration precautions, and Recommendations were discussed with the patient, parent and home nurse. Both expressed understanding.   .     Barriers to Learning: co-morbidities, medical diagnosis, participation level, and severity of symptoms    Teaching Method: Verbal, Audio/Visual          Rolando Turner M.S. CCC-SLP  [facility]

## 2024-11-05 NOTE — TELEPHONE ENCOUNTER
Spoke to patient's mom concerning swallow study done today. Reports no recurrent choking spell since last episode. Reports study was normal and that a specific diet has been recommended. I will review note or documentation for this encounter when it becomes available. Patient to keep next appointment with me in one month or sooner if needed.       Kang Merrill MD, [facility], -II  3:00 PM

## 2025-01-09 DIAGNOSIS — L02.612 ABSCESS OF LEFT FOOT: ICD-10-CM

## 2025-01-09 RX ORDER — MUPIROCIN 20 MG/G
OINTMENT TOPICAL 2 TIMES DAILY
Qty: 30 G | Refills: 1 | Status: SHIPPED | OUTPATIENT
Start: 2025-01-09

## 2025-01-10 ENCOUNTER — TELEPHONE (OUTPATIENT)
Dept: FAMILY MEDICINE | Facility: CLINIC | Age: 40
End: 2025-01-10
Payer: MEDICAID

## 2025-03-11 ENCOUNTER — OFFICE VISIT (OUTPATIENT)
Dept: FAMILY MEDICINE | Facility: CLINIC | Age: 40
End: 2025-03-11
Payer: MEDICAID

## 2025-03-11 VITALS
OXYGEN SATURATION: 96 % | RESPIRATION RATE: 20 BRPM | DIASTOLIC BLOOD PRESSURE: 82 MMHG | BODY MASS INDEX: 35.85 KG/M2 | TEMPERATURE: 98 F | HEART RATE: 84 BPM | SYSTOLIC BLOOD PRESSURE: 113 MMHG | HEIGHT: 62 IN

## 2025-03-11 DIAGNOSIS — T17.308D CHOKING, SUBSEQUENT ENCOUNTER: ICD-10-CM

## 2025-03-11 DIAGNOSIS — G11.11 FRIEDREICH ATAXIA: Primary | ICD-10-CM

## 2025-03-11 PROCEDURE — 99215 OFFICE O/P EST HI 40 MIN: CPT | Mod: PBBFAC

## 2025-03-11 NOTE — PROGRESS NOTES
[facility] Note    Subjective:     Patient ID: Anuj Caldwell is a 39 y.o. male    Chief Complaint:   Chief Complaint   Patient presents with    Medication Refill     Flexeril    Protonix    Trileptal        HPI  Anuj Caldwell is a 39 y.o. male who presents for follow-up      HTN  -BP at goal, 113/82  -SBP in 110's and 120's at home. 110/72 in office today.    Choking episode  -no recent episode  -Modified Barium Swallowing study 11/05/2024 w/o evidence of aspiration  -Diet: Thin (via Provale cup (controlled sip cup) liquids (IDDSI Soft/bite size) and consistencies (IDDSI 6)      Friedreich's ataxia  -wheelchair bound  -No new concern  -Requesting Rx for suction supplies and hospital mattress at [facility]'s     Right Axilla Abscess  -ED visit 01/28/2025 S/p I&D  -Completed course of Doxy    GERD:  -Reflux stable on protonix  -Swallowing fine, no choking spells  -Tolerates solids/mix has to be moist, grainy or foods he can aspirate avoided     Neurogenic bladder  -Suprapubic catheter: 2.5 hours June 5th, 2024.   -Cellulitis at site resolved  -Home health nurse takes care of patient     PMHx:  Freidreich ataxia  HTN  Neurogenic bladder  GERD  Minimal dextroscoliosis of the thoracolumbar spine. Reed angle measurement of 9 degrees was obtained between T12 and L4.      Care Team  -Dr. Reyes: GI  -Sees no Neurologist presently      Current Outpatient Medications   Medication Instructions    aspirin (ECOTRIN) 81 mg, Oral, Daily    brexpiprazole (REXULTI) 2 mg    cetirizine (ZYRTEC) 10 mg, Oral, Daily    cyclobenzaprine (FLEXERIL) 10 mg, Oral, Nightly    diltiaZEM (DILACOR XR) 240 mg, Oral, 2 times daily    docusate sodium (COLACE) 100 mg, 2 times daily    fluticasone propionate (FLONASE) 100 mcg, Each Nostril, 2 times daily    folic acid (FOLVITE) 1 mg, Daily    hydrOXYzine pamoate (VISTARIL) 25 mg, 2 times daily PRN    levoFLOXacin (LEVAQUIN) 750 mg, Oral, Daily    losartan (COZAAR) 50 mg, Daily     "metoprolol succinate (TOPROL-XL) 100 mg, Daily    mirtazapine (REMERON) 30 mg, Oral, Nightly    mupirocin (BACTROBAN) 2 % ointment Topical (Top), 2 times daily    ondansetron (ZOFRAN-ODT) 8 mg, Oral, 2 times daily    OXcarbazepine (TRILEPTAL) 600 mg, Oral, 2 times daily PRN    pantoprazole (PROTONIX) 40 mg, Oral, Daily    phenazopyridine (PYRIDIUM) 200 mg, Daily PRN    tacrolimus (PROTOPIC) 0.1 % ointment Topical (Top), 2 times daily    triamcinolone acetonide 0.1% (KENALOG) 0.1 % cream Topical (Top), 2 times daily    vortioxetine (TRINTELLIX) 20 mg       Review of Systems  As per HPI    Objective:         9/17/2024     1:44 PM 10/18/2024    12:11 PM 3/11/2025     1:27 PM   Vitals - 1 value per visit   SYSTOLIC 110 131 113   DIASTOLIC 72 90 82   Pulse 72 78 84   Temp 97.2 °F (36.2 °C) 99 °F (37.2 °C) 97.9 °F (36.6 °C)   Resp 20  20   SPO2 95 %  96 %   Weight (lb) 196     Weight (kg) 88.905     Height 5' 2" (1.575 m)  5' 2" (1.575 m)   BMI (Calculated) 35.8     Pain Score Zero Zero      Physical Exam  Gen: No acute distress, wheelchair-bound  CV: RRR, no murmurs. No LE edema.  Resp: CTAB, breathing non labored on room air.  Abd: Soft, non-distended, non-tender, bowel sounds normoactive, suprapubic catheter in place  MSK: significant spasticity present on all extremities    Assessment & Plan     Assessment & Plan  1. Friedreich ataxia    2. Choking, subsequent encounter      Plan:  -Patient stable, no concerns  -Neurology referral placed. Mother will attempt to contact Neuro clinic to expedite scheduling  -Will order suction supplies, orders for wheel chair and hospital mattress  -Patient will benefit from new size wheelchair given immobility and increased BMI since acquiring current wheelchair.  -Dr. Mata graciously stopped by to say hi to patient. Signs off on patient HH forms.  -BP at goal. Continue current antihypertensive regimen.  -Continue Protonix 40 mg q.d, refill sent  -Choking and ED precautions " reviewed.  -Keep future appointment with ENT, Urology, and GI      Orders Placed This Encounter    Ambulatory referral/consult to Neurology     Health Maintenance   Topic Date Due    TETANUS VACCINE  08/14/2016    COVID-19 Vaccine (3 - 2024-25 season) 09/01/2024    Lipid Panel  02/26/2026    Hemoglobin A1c (Diabetic Prevention Screening)  06/06/2026    RSV Vaccine (Age 60+ and Pregnant patients) (1 - 1-dose 75+ series) 10/26/2061    Hepatitis C Screening  Completed    Influenza Vaccine  Completed    HIV Screening  Completed    Pneumococcal Vaccines (Age 0-49)  Aged Out     Future Appointments   Date Time Provider Department Center   6/17/2025  1:00 PM Kang Merrill MD [facility] FM RES [place]   10/15/2025 11:30 AM Madison Fowler FNP [facility] ENT [place]         RTC in 3 months      Kang Merrill MD, MBS  U Family Medicine Resident, [facility]

## 2025-03-18 ENCOUNTER — TELEPHONE (OUTPATIENT)
Dept: FAMILY MEDICINE | Facility: CLINIC | Age: 40
End: 2025-03-18
Payer: MEDICAID

## 2025-03-18 RX ORDER — CYCLOBENZAPRINE HCL 10 MG
10 TABLET ORAL NIGHTLY
Qty: 90 TABLET | Refills: 1 | Status: SHIPPED | OUTPATIENT
Start: 2025-03-18

## 2025-03-18 RX ORDER — PANTOPRAZOLE SODIUM 40 MG/1
40 TABLET, DELAYED RELEASE ORAL DAILY
Qty: 90 TABLET | Refills: 1 | Status: SHIPPED | OUTPATIENT
Start: 2025-03-18

## 2025-03-18 NOTE — TELEPHONE ENCOUNTER
Flexeril and Protonix refill sent to pharmacy of choice.      Kang Merrill MD, [facility]  [facility] Family Medicine Resident, HO-II  6:50 PM

## 2025-03-20 ENCOUNTER — TELEPHONE (OUTPATIENT)
Dept: FAMILY MEDICINE | Facility: CLINIC | Age: 40
End: 2025-03-20
Payer: MEDICAID

## 2025-03-20 DIAGNOSIS — G11.11 FRIEDREICH ATAXIA: Primary | ICD-10-CM

## 2025-03-20 NOTE — TELEPHONE ENCOUNTER
Rx for hospital bed mattress placed. Message to  sent. Order will be sent to [facility] to complete order.      Kang Merrill MD, [facility] Family Medicine Resident, -II  [phone]

## 2025-03-21 ENCOUNTER — TELEPHONE (OUTPATIENT)
Dept: FAMILY MEDICINE | Facility: CLINIC | Age: 40
End: 2025-03-21
Payer: MEDICAID

## 2025-03-27 ENCOUNTER — TELEPHONE (OUTPATIENT)
Dept: FAMILY MEDICINE | Facility: CLINIC | Age: 40
End: 2025-03-27
Payer: MEDICAID

## 2025-04-03 ENCOUNTER — TELEPHONE (OUTPATIENT)
Dept: FAMILY MEDICINE | Facility: CLINIC | Age: 40
End: 2025-04-03
Payer: MEDICAID

## 2025-04-03 NOTE — TELEPHONE ENCOUNTER
----- Message from Kang Merrill sent at 3/20/2025  6:06 PM CDT -----  Regarding: RE: Prescription  Order placed. Please print, and have patient caregivers  form. Thanks.  ----- Message -----  From: Bev Morfin  Sent: 3/19/2025  11:08 AM CDT  To: Kang Merrill MD; [facility] Family Medicine Clini#  Subject: Prescription                                     Patient's mother called inquiring about the prescription and clinical  notes and order for hospital mattress from [facility].  They are waiting for the orders to come through.Also asked about assistance with getting a new wheelchair.

## 2025-04-03 NOTE — TELEPHONE ENCOUNTER
Need order for wheelchair to [facility], and chart notes

## 2025-04-04 ENCOUNTER — TELEPHONE (OUTPATIENT)
Dept: EMERGENCY MEDICINE | Facility: HOSPITAL | Age: 40
End: 2025-04-04
Payer: MEDICAID

## 2025-04-04 ENCOUNTER — TELEPHONE (OUTPATIENT)
Dept: FAMILY MEDICINE | Facility: CLINIC | Age: 40
End: 2025-04-04
Payer: MEDICAID

## 2025-04-04 DIAGNOSIS — E87.1 HYPONATREMIA: Primary | ICD-10-CM

## 2025-04-04 DIAGNOSIS — G11.11 FRIEDREICH ATAXIA: Primary | ICD-10-CM

## 2025-04-04 NOTE — TELEPHONE ENCOUNTER
Spoke with Ms. Graham concerning wheel chair order. She provided specification to help facilitate ne RX: Head/neck rest; tilt mechanism preferred. Duration: lifetime.  PT may go into evaluate and make further recommendation per need. Will place order, Ms. Howard will have pt  order.    Neurology referral to [facility] denied, insurance recommend referral to Dr. Simon Harman in [place] for associated diagnosis. Caregiver, Ms. Graham not amenable. Agreeable to calling insurance to inquire of a closer provider, perhaps around to [facility].    Also intimated of abnormal labs, hyponatremia from labs over 1 year ago. Pt has been asymptomatic. Caregiver reported speaking with Dr. Mata who instructed HH to obtain repeat BMP. Will send salt tabs if still abnormal. F/u in few weeks.           Kang Merrill MD, [facility] Family Medicine Resident, HO-II  1:56 PM

## 2025-04-04 NOTE — TELEPHONE ENCOUNTER
Spoke to [facility] , received DR Temi gaviria from 3.23 showing sodium of 129, ordered repeat BMP ,they will try to go today and have my cell # to call with results tomorrow.

## 2025-04-05 ENCOUNTER — TELEPHONE (OUTPATIENT)
Dept: FAMILY MEDICINE | Facility: CLINIC | Age: 40
End: 2025-04-05
Payer: MEDICAID

## 2025-04-05 NOTE — TELEPHONE ENCOUNTER
Spoke with mother of patient over the phone regarding recent hyponatremia lab of 125 on 04/04/25. Mother states that patient is eating well (3 meals/day) without nausea/vomiting. She states he has been taking Trileptal for a very long time without any drug holidays. Informed mother about options to go to the ED for further workup of his hyponatremia regarding urine studies due to persistent hyponatremia for the last several months vs outpatient workup with labs being done by home health. Mother states he is overall doing well and states she is having to take care of her daughter who is bed-bound along with her  who is also in a wheelchair. She states she has a lot of responsibilities over the weekend taking care of her family that would make her uncomfortable to leave the house for several hours away from rest of family members and wait in the ED. Mother of patient states she would much rather have workup completed by the home health nurse. Informed mother to administer one salt tablet OTC with each meal (3x daily). Mother voiced understanding.   - will order Serum Osmolality, Urine Osmolality, Urine Na+, repeat BMP, UA to begin workup  - if labs are consistent with SIADH 2/2 Tripletal, pt will need alternative antiepileptic seizure medication. Will plan to send refer to neurology. Mother of patient states she cannot drive to [facility] to see  Dr. Simon Harman due to distance and would much rather see a neurologist in [place] or [place] if possible.     Sergio Patel MD   \[facility]\"" Family Medicine Resident  04/05/2025

## 2025-04-06 ENCOUNTER — TELEPHONE (OUTPATIENT)
Dept: HEPATOLOGY | Facility: HOSPITAL | Age: 40
End: 2025-04-06
Payer: MEDICAID

## 2025-04-06 DIAGNOSIS — G11.11 FRIEDREICH'S ATAXIA: Primary | ICD-10-CM

## 2025-04-07 NOTE — TELEPHONE ENCOUNTER
Order for wheelchair placed. Message sent to  to fax to [place] [facility].      Kang Merrill MD, MBS  U Family Medicine Resident, -II

## 2025-04-08 ENCOUNTER — TELEPHONE (OUTPATIENT)
Dept: FAMILY MEDICINE | Facility: CLINIC | Age: 40
End: 2025-04-08
Payer: MEDICAID

## 2025-04-08 DIAGNOSIS — Z87.898 HISTORY OF SEIZURE: Primary | ICD-10-CM

## 2025-04-08 NOTE — TELEPHONE ENCOUNTER
----- Message from Bev sent at 4/8/2025  1:43 PM CDT -----  Regarding: Phone Call Message  Kushal De La Cruz (Nurse) called to speak to the provider regarding orders to administer OTC medicine 3x daily with meals.  Please call [phone].  Thank You.

## 2025-04-08 NOTE — TELEPHONE ENCOUNTER
Spoke with mother over the phone regarding neurology referral. 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   Informed mother of pt that a new neurology referral will be made to [facility] neurology for seizures since he was diagnosed with Tonic-clonic seizure 20 years ago and has been on Trileptal since. Mother states she has to take care of both disabled  and bed-bound daughter which is difficulty to leave them home by themselves w/o supervision for prolonged time.    Sergio Patel MD   U Family Medicine Resident  04/08/2025

## 2025-04-10 ENCOUNTER — TELEPHONE (OUTPATIENT)
Dept: FAMILY MEDICINE | Facility: CLINIC | Age: 40
End: 2025-04-10
Payer: MEDICAID

## 2025-04-10 DIAGNOSIS — G40.919 INTRACTABLE EPILEPSY WITHOUT STATUS EPILEPTICUS, UNSPECIFIED EPILEPSY TYPE: Primary | ICD-10-CM

## 2025-04-10 DIAGNOSIS — N30.00 ACUTE CYSTITIS WITHOUT HEMATURIA: Primary | ICD-10-CM

## 2025-04-10 RX ORDER — NITROFURANTOIN 25; 75 MG/1; MG/1
100 CAPSULE ORAL 2 TIMES DAILY
Qty: 10 CAPSULE | Refills: 0 | Status: SHIPPED | OUTPATIENT
Start: 2025-04-10 | End: 2025-04-15

## 2025-04-10 NOTE — TELEPHONE ENCOUNTER
----- Message from COREY Barrera sent at 4/10/2025  9:19 AM CDT -----  Regarding: referral  We have received the referral for seizure on this patient. Can you kindly order a routine EEG (NEU4) so we  may have it on file when evaluated by Dr. San? Thank you!

## 2025-04-11 NOTE — TELEPHONE ENCOUNTER
Attempted to call nurse at number provided below. Unable to reach. Will try at a later time.    Kang Merrill MD, [facility] Family Medicine Resident, HO-II            ----- Message from Bev sent at 4/8/2025  1:43 PM CDT -----  Regarding: Phone Call Message  Kushal De La Cruz (Nurse) called to speak to the provider regarding orders to administer OTC medicine 3x daily with meals.  Please call [phone].  Thank You.

## 2025-04-11 NOTE — TELEPHONE ENCOUNTER
Spoke with pt's mother, Ms. Graham about recent  labs. Urine sodium pending.   Will treat UTI per lab, to choice of pharmacy. Macrobid 100 mg BID x 5 days sent to choice of pharmacy.   Avoid Bactrim, Trimethoprim component has been should to be associated with inducing hyponatremia via various mechanisms.    Need for routine EEG discussed. Mother prefers to follow with Neurologist locally, states [facility] indicated pt will likely be seen in October, pt could be seen sooner if any cancellation.    Reason for hyponatremia work-up discussed. Pt asymptomatic, denies confusion, HA, dizziness, N/V or lethargy. Will follow up on the urine study. Manage hyponatremia accordingly pending result.          Kang Merrill MD, MBS  [facility] Family Medicine Resident, HO-II  [phone]

## 2025-04-21 ENCOUNTER — TELEPHONE (OUTPATIENT)
Dept: FAMILY MEDICINE | Facility: CLINIC | Age: 40
End: 2025-04-21
Payer: MEDICAID

## 2025-04-21 NOTE — TELEPHONE ENCOUNTER
Patient has completed antibiotic for UTI, had 5 daysdo you want another round of antibiotic?  And it has been almost 3 mo on sodium tabs and time for another level [facility] to [facility].

## 2025-05-05 ENCOUNTER — TELEPHONE (OUTPATIENT)
Dept: FAMILY MEDICINE | Facility: CLINIC | Age: 40
End: 2025-05-05
Payer: MEDICAID

## 2025-05-05 DIAGNOSIS — E87.1 HYPONATREMIA: Primary | ICD-10-CM

## 2025-05-22 ENCOUNTER — TELEPHONE (OUTPATIENT)
Dept: FAMILY MEDICINE | Facility: CLINIC | Age: 40
End: 2025-05-22
Payer: MEDICAID

## 2025-05-22 DIAGNOSIS — E87.1 CHRONIC HYPONATREMIA: Primary | ICD-10-CM

## 2025-05-22 NOTE — TELEPHONE ENCOUNTER
Called mother of pt to ascertain concerns. Pt taking thermotabs 3x daily. Denies altered mentation, drowsiness or weakness. Reports some nausea over the past couple of days and withheld given salt tab this past 2-3 days. Advised to resume taking salt tablets at least twice daily for now. Will order HH lab for tomorrow. Most recent level has been at 131, which is acceptable. Will CTM. ED precaution given, mother voiced understanding.     Kang Merrill MD, [facility] Family Medicine Resident, -  10:45 AM          Patient mother called, stating she had to stop giving Therma tab, patient extremely thirsty,  And nauseated. Please advise.

## 2025-06-05 ENCOUNTER — PROCEDURE VISIT (OUTPATIENT)
Dept: NEUROLOGY | Facility: HOSPITAL | Age: 40
End: 2025-06-05
Attending: UROLOGY
Payer: MEDICAID

## 2025-06-05 DIAGNOSIS — Z87.898 HISTORY OF SEIZURE: Primary | ICD-10-CM

## 2025-06-05 DIAGNOSIS — G40.919 INTRACTABLE EPILEPSY WITHOUT STATUS EPILEPTICUS, UNSPECIFIED EPILEPSY TYPE: ICD-10-CM

## 2025-06-05 PROCEDURE — 95816 EEG AWAKE AND DROWSY: CPT

## 2025-06-05 NOTE — PROCEDURES
Date of Service: 06/05/2025       Patient Name: Anuj Caldwell  MRN: [id number]   Location: Room/bed info not found                 Electroencephalogram Procedure Note      Indications: Diagnostic, seizures     Activation Procedures:  photic     EEG Description: background is continuous, mixed frequency slowing, PDR 7hz, significant movement artifact, no definitive epileptiform discharges. No clinical events captured.     Impression: abnormal EEG due to mixed frequency slowing, no definitive epileptiform discharges appreciated. Clinical correlation advised.      Robb Peguero MD  Neurology      -----------------------------------------------------------------     Technical Description   International 10-20 electrode placement was used. The record was obtained with the patient awake/drowsy.  The record is of good technical quality for purposes of interpretation.

## 2025-06-11 ENCOUNTER — TELEPHONE (OUTPATIENT)
Dept: FAMILY MEDICINE | Facility: CLINIC | Age: 40
End: 2025-06-11
Payer: MEDICAID

## 2025-06-16 ENCOUNTER — TELEPHONE (OUTPATIENT)
Dept: FAMILY MEDICINE | Facility: CLINIC | Age: 40
End: 2025-06-16
Payer: MEDICAID

## 2025-06-18 ENCOUNTER — TELEPHONE (OUTPATIENT)
Dept: FAMILY MEDICINE | Facility: CLINIC | Age: 40
End: 2025-06-18
Payer: MEDICAID

## 2025-06-18 DIAGNOSIS — E87.1 HYPONATREMIA: Primary | ICD-10-CM

## 2025-06-18 NOTE — TELEPHONE ENCOUNTER
Mom of patient contacted. Reports patient remains asymptomatic without denies altered mentation, drowsiness or weakness. Patient takes 2 salt tablet daily. Drinks about 8 x  8 oz cups of water daily (64 ounces = 1,890 cc of water). Recommend restricting to only 6 cups of 8 ounces cup of water ( 48 ounces = approx 1,400 cc of water). Mother reports understanding and amenable. ED precaution given. Any of the symptoms mentioned above should prompt caregiver to bring patient immediately to Emergency Department. Mother verbalizes understanding.       New work-up labs: Serum Osmol, urine osmol, urine Na+, K, and Cl- ordered.  nurse to complete labs as soon as tomorrow.  Nephrology consult already placed. I messaged Dr. Mcguire concerning patient and case.     Neurology appt with [facility] (Dr. San) for 10/15/2025 pending. I will talk to Dr. San to try to expedite process.      Kang Merrill MD, [facility] Family Medicine Resident, HO-II                  Please   call mom and discuss sodium of 127 on 6/6/2025 labs, needs CEDEÑO, discuss with Neuro (trileptal says BID prn but I imagine he takes BID routine), Nephrology referral placed, persistent hyponatremia for > 6 mos  without change in meds

## 2025-06-24 ENCOUNTER — TELEPHONE (OUTPATIENT)
Dept: FAMILY MEDICINE | Facility: CLINIC | Age: 40
End: 2025-06-24
Payer: MEDICAID

## 2025-06-24 NOTE — TELEPHONE ENCOUNTER
HH returned lab BMP with sodium level at 129. Recently adjusted regimen to alternating thermotabs 425 mg tablets  TID and BID every other day. Fluid restriction in place. Please phone encounter from 6/18/2025.    Dr. Mcguire (Nephrology) intimated about patient and will follow. Dr. San (Neurology) contacted about patient to explore seeing pt sooner. Currently scheduled for 10/15/2025 and will plan to see sooner if any cancellations. Mother and caregiver amenable to plan.      Other labs still pending (Serum Osmol, urine osmol, urine Na+, K, and Cl- ). Will follow up on.      Kang Merrill MD, [facility]  [facility] Family Medicine Resident, -II  6/24/2025

## 2025-07-05 ENCOUNTER — TELEPHONE (OUTPATIENT)
Dept: FAMILY MEDICINE | Facility: CLINIC | Age: 40
End: 2025-07-05
Payer: MEDICAID

## 2025-07-05 DIAGNOSIS — R39.89 SUSPECTED UTI: Primary | ICD-10-CM

## 2025-07-07 ENCOUNTER — OFFICE VISIT (OUTPATIENT)
Dept: URGENT CARE | Facility: CLINIC | Age: 40
End: 2025-07-07
Payer: MEDICAID

## 2025-07-07 ENCOUNTER — HOSPITAL ENCOUNTER (INPATIENT)
Facility: HOSPITAL | Age: 40
LOS: 8 days | Discharge: HOME HEALTH | DRG: 689 | End: 2025-07-15
Attending: STUDENT IN AN ORGANIZED HEALTH CARE EDUCATION/TRAINING PROGRAM | Admitting: FAMILY MEDICINE
Payer: MEDICAID

## 2025-07-07 VITALS
SYSTOLIC BLOOD PRESSURE: 115 MMHG | DIASTOLIC BLOOD PRESSURE: 80 MMHG | HEART RATE: 79 BPM | TEMPERATURE: 98 F | HEIGHT: 62 IN | BODY MASS INDEX: 35.33 KG/M2 | WEIGHT: 192 LBS | RESPIRATION RATE: 20 BRPM | OXYGEN SATURATION: 92 %

## 2025-07-07 DIAGNOSIS — G11.11 FRIEDREICH'S ATAXIA: ICD-10-CM

## 2025-07-07 DIAGNOSIS — R07.9 CHEST PAIN: ICD-10-CM

## 2025-07-07 DIAGNOSIS — R91.8 BILATERAL PULMONARY INFILTRATES: ICD-10-CM

## 2025-07-07 DIAGNOSIS — N31.9 NEUROGENIC BLADDER: ICD-10-CM

## 2025-07-07 DIAGNOSIS — J18.9 PNEUMONIA OF BOTH LOWER LOBES DUE TO INFECTIOUS ORGANISM: Primary | ICD-10-CM

## 2025-07-07 DIAGNOSIS — I49.9 IRREGULAR HEART RHYTHM: ICD-10-CM

## 2025-07-07 DIAGNOSIS — R79.89 ELEVATED LFTS: ICD-10-CM

## 2025-07-07 DIAGNOSIS — R06.82 TACHYPNEA: ICD-10-CM

## 2025-07-07 DIAGNOSIS — R06.2 WHEEZING: ICD-10-CM

## 2025-07-07 DIAGNOSIS — R10.11 RIGHT UPPER QUADRANT ABDOMINAL PAIN: ICD-10-CM

## 2025-07-07 DIAGNOSIS — R06.4 LABORED BREATHING: Primary | ICD-10-CM

## 2025-07-07 DIAGNOSIS — N39.0 COMPLICATED UTI (URINARY TRACT INFECTION): ICD-10-CM

## 2025-07-07 DIAGNOSIS — R09.02 HYPOXIA: ICD-10-CM

## 2025-07-07 DIAGNOSIS — R05.9 COUGH: ICD-10-CM

## 2025-07-07 DIAGNOSIS — K21.9 GASTROESOPHAGEAL REFLUX DISEASE, UNSPECIFIED WHETHER ESOPHAGITIS PRESENT: ICD-10-CM

## 2025-07-07 DIAGNOSIS — N30.01 ACUTE CYSTITIS WITH HEMATURIA: ICD-10-CM

## 2025-07-07 DIAGNOSIS — R06.02 SOB (SHORTNESS OF BREATH): ICD-10-CM

## 2025-07-07 LAB
ALBUMIN SERPL-MCNC: 3.9 G/DL (ref 3.5–5)
ALBUMIN/GLOB SERPL: 0.9 RATIO (ref 1.1–2)
ALLENS TEST BLOOD GAS (OHS): ABNORMAL
ALP SERPL-CCNC: 139 UNIT/L (ref 40–150)
ALT SERPL-CCNC: 24 UNIT/L (ref 0–55)
ANION GAP SERPL CALC-SCNC: 9 MEQ/L
AST SERPL-CCNC: 20 UNIT/L (ref 11–45)
B PERT.PT PRMT NPH QL NAA+NON-PROBE: NOT DETECTED
BACTERIA #/AREA URNS AUTO: ABNORMAL /HPF
BASE EXCESS BLD CALC-SCNC: 4.3 MMOL/L
BASOPHILS # BLD AUTO: 0.07 X10(3)/MCL
BASOPHILS NFR BLD AUTO: 0.8 %
BILIRUB SERPL-MCNC: 0.4 MG/DL
BILIRUB UR QL STRIP.AUTO: NEGATIVE
BLOOD GAS SAMPLE TYPE (OHS): ABNORMAL
BNP BLD-MCNC: 32 PG/ML
BUN SERPL-MCNC: 9.8 MG/DL (ref 8.9–20.6)
C PNEUM DNA NPH QL NAA+NON-PROBE: NOT DETECTED
CALCIUM SERPL-MCNC: 9.5 MG/DL (ref 8.4–10.2)
CHLORIDE SERPL-SCNC: 96 MMOL/L (ref 98–107)
CLARITY UR: ABNORMAL
CO2 BLDA-SCNC: 31.2 MMOL/L
CO2 SERPL-SCNC: 26 MMOL/L (ref 22–29)
COHGB MFR BLDA: 2.9 %
COLOR UR AUTO: YELLOW
CREAT SERPL-MCNC: 0.92 MG/DL (ref 0.72–1.25)
CREAT/UREA NIT SERPL: 11
DRAWN BY BLOOD GAS (OHS): ABNORMAL
EOSINOPHIL # BLD AUTO: 0.62 X10(3)/MCL (ref 0–0.9)
EOSINOPHIL NFR BLD AUTO: 7.2 %
ERYTHROCYTE [DISTWIDTH] IN BLOOD BY AUTOMATED COUNT: 13 % (ref 11.5–17)
FLUAV AG UPPER RESP QL IA.RAPID: NOT DETECTED
FLUBV AG UPPER RESP QL IA.RAPID: NOT DETECTED
GFR SERPLBLD CREATININE-BSD FMLA CKD-EPI: >60 ML/MIN/1.73/M2
GLOBULIN SER-MCNC: 4.4 GM/DL (ref 2.4–3.5)
GLUCOSE SERPL-MCNC: 104 MG/DL (ref 74–100)
GLUCOSE UR QL STRIP: NORMAL
HADV DNA NPH QL NAA+NON-PROBE: NOT DETECTED
HCO3 BLDA-SCNC: 29.8 MMOL/L
HCOV 229E RNA NPH QL NAA+NON-PROBE: NOT DETECTED
HCOV HKU1 RNA NPH QL NAA+NON-PROBE: NOT DETECTED
HCOV NL63 RNA NPH QL NAA+NON-PROBE: NOT DETECTED
HCOV OC43 RNA NPH QL NAA+NON-PROBE: NOT DETECTED
HCT VFR BLD AUTO: 47.8 % (ref 42–52)
HGB BLD-MCNC: 16.4 G/DL (ref 14–18)
HGB UR QL STRIP: ABNORMAL
HMPV RNA NPH QL NAA+NON-PROBE: NOT DETECTED
HOLD SPECIMEN: NORMAL
HPIV1 RNA NPH QL NAA+NON-PROBE: NOT DETECTED
HPIV2 RNA NPH QL NAA+NON-PROBE: NOT DETECTED
HPIV3 RNA NPH QL NAA+NON-PROBE: NOT DETECTED
HPIV4 RNA NPH QL NAA+NON-PROBE: NOT DETECTED
HYALINE CASTS #/AREA URNS LPF: ABNORMAL /LPF
IMM GRANULOCYTES # BLD AUTO: 0.02 X10(3)/MCL (ref 0–0.04)
IMM GRANULOCYTES NFR BLD AUTO: 0.2 %
KETONES UR QL STRIP: NEGATIVE
LACTATE SERPL-SCNC: 1.8 MMOL/L (ref 0.5–2.2)
LEUKOCYTE ESTERASE UR QL STRIP: 500
LYMPHOCYTES # BLD AUTO: 1.91 X10(3)/MCL (ref 0.6–4.6)
LYMPHOCYTES NFR BLD AUTO: 22.2 %
M PNEUMO DNA NPH QL NAA+NON-PROBE: NOT DETECTED
MCH RBC QN AUTO: 28.5 PG (ref 27–31)
MCHC RBC AUTO-ENTMCNC: 34.3 G/DL (ref 33–36)
MCV RBC AUTO: 83 FL (ref 80–94)
METHGB MFR BLDA: 0.8 %
MONOCYTES # BLD AUTO: 0.86 X10(3)/MCL (ref 0.1–1.3)
MONOCYTES NFR BLD AUTO: 10 %
MUCOUS THREADS URNS QL MICRO: ABNORMAL /LPF
NEUTROPHILS # BLD AUTO: 5.11 X10(3)/MCL (ref 2.1–9.2)
NEUTROPHILS NFR BLD AUTO: 59.6 %
NITRITE UR QL STRIP: NEGATIVE
NRBC BLD AUTO-RTO: 0 %
O2 HB BLOOD GAS (OHS): 90.7 %
OXYHGB MFR BLDA: 17.2 G/DL
PCO2 BLDA: 46 MMHG (ref 40–50)
PH BLDA: 7.42 [PH] (ref 7.3–7.4)
PH UR STRIP: 7 [PH]
PLATELET # BLD AUTO: 329 X10(3)/MCL (ref 130–400)
PMV BLD AUTO: 8.6 FL (ref 7.4–10.4)
PO2 BLDA: 62 MMHG (ref 30–40)
POTASSIUM SERPL-SCNC: 3.7 MMOL/L (ref 3.5–5.1)
PROT SERPL-MCNC: 8.3 GM/DL (ref 6.4–8.3)
PROT UR QL STRIP: ABNORMAL
RBC # BLD AUTO: 5.76 X10(6)/MCL (ref 4.7–6.1)
RBC #/AREA URNS AUTO: >100 /HPF
RSV A 5' UTR RNA NPH QL NAA+PROBE: NOT DETECTED
RSV RNA NPH QL NAA+NON-PROBE: NOT DETECTED
RV+EV RNA NPH QL NAA+NON-PROBE: NOT DETECTED
SAO2 % BLDA: 94.2 %
SARS-COV-2 RNA RESP QL NAA+PROBE: NOT DETECTED
SODIUM SERPL-SCNC: 131 MMOL/L (ref 136–145)
SP GR UR STRIP.AUTO: 1.02 (ref 1–1.03)
SQUAMOUS #/AREA URNS LPF: ABNORMAL /HPF
UROBILINOGEN UR STRIP-ACNC: NORMAL
WBC # BLD AUTO: 8.59 X10(3)/MCL (ref 4.5–11.5)
WBC #/AREA URNS AUTO: >100 /HPF
WBC CLUMPS UR QL AUTO: ABNORMAL

## 2025-07-07 PROCEDURE — 25000003 PHARM REV CODE 250: Performed by: STUDENT IN AN ORGANIZED HEALTH CARE EDUCATION/TRAINING PROGRAM

## 2025-07-07 PROCEDURE — 99900035 HC TECH TIME PER 15 MIN (STAT)

## 2025-07-07 PROCEDURE — 11000001 HC ACUTE MED/SURG PRIVATE ROOM

## 2025-07-07 PROCEDURE — 63600175 PHARM REV CODE 636 W HCPCS

## 2025-07-07 PROCEDURE — 25000242 PHARM REV CODE 250 ALT 637 W/ HCPCS: Performed by: STUDENT IN AN ORGANIZED HEALTH CARE EDUCATION/TRAINING PROGRAM

## 2025-07-07 PROCEDURE — 63600175 PHARM REV CODE 636 W HCPCS: Performed by: STUDENT IN AN ORGANIZED HEALTH CARE EDUCATION/TRAINING PROGRAM

## 2025-07-07 PROCEDURE — 99215 OFFICE O/P EST HI 40 MIN: CPT | Mod: PBBFAC,25

## 2025-07-07 PROCEDURE — 81015 MICROSCOPIC EXAM OF URINE: CPT | Performed by: STUDENT IN AN ORGANIZED HEALTH CARE EDUCATION/TRAINING PROGRAM

## 2025-07-07 PROCEDURE — 25000003 PHARM REV CODE 250

## 2025-07-07 PROCEDURE — 80053 COMPREHEN METABOLIC PANEL: CPT | Performed by: STUDENT IN AN ORGANIZED HEALTH CARE EDUCATION/TRAINING PROGRAM

## 2025-07-07 PROCEDURE — 21400001 HC TELEMETRY ROOM

## 2025-07-07 PROCEDURE — 87486 CHLMYD PNEUM DNA AMP PROBE: CPT | Performed by: STUDENT IN AN ORGANIZED HEALTH CARE EDUCATION/TRAINING PROGRAM

## 2025-07-07 PROCEDURE — 82803 BLOOD GASES ANY COMBINATION: CPT

## 2025-07-07 PROCEDURE — 94640 AIRWAY INHALATION TREATMENT: CPT

## 2025-07-07 PROCEDURE — 87077 CULTURE AEROBIC IDENTIFY: CPT | Performed by: STUDENT IN AN ORGANIZED HEALTH CARE EDUCATION/TRAINING PROGRAM

## 2025-07-07 PROCEDURE — 87637 SARSCOV2&INF A&B&RSV AMP PRB: CPT

## 2025-07-07 PROCEDURE — 83605 ASSAY OF LACTIC ACID: CPT | Performed by: STUDENT IN AN ORGANIZED HEALTH CARE EDUCATION/TRAINING PROGRAM

## 2025-07-07 PROCEDURE — 85025 COMPLETE CBC W/AUTO DIFF WBC: CPT | Performed by: STUDENT IN AN ORGANIZED HEALTH CARE EDUCATION/TRAINING PROGRAM

## 2025-07-07 PROCEDURE — 83880 ASSAY OF NATRIURETIC PEPTIDE: CPT | Performed by: STUDENT IN AN ORGANIZED HEALTH CARE EDUCATION/TRAINING PROGRAM

## 2025-07-07 PROCEDURE — 87040 BLOOD CULTURE FOR BACTERIA: CPT | Performed by: STUDENT IN AN ORGANIZED HEALTH CARE EDUCATION/TRAINING PROGRAM

## 2025-07-07 PROCEDURE — 99215 OFFICE O/P EST HI 40 MIN: CPT | Mod: S$PBB,,,

## 2025-07-07 RX ORDER — IBUPROFEN 200 MG
16 TABLET ORAL
Status: DISCONTINUED | OUTPATIENT
Start: 2025-07-07 | End: 2025-07-15 | Stop reason: HOSPADM

## 2025-07-07 RX ORDER — FLUTICASONE PROPIONATE 50 MCG
2 SPRAY, SUSPENSION (ML) NASAL 2 TIMES DAILY
Status: DISCONTINUED | OUTPATIENT
Start: 2025-07-07 | End: 2025-07-15 | Stop reason: HOSPADM

## 2025-07-07 RX ORDER — ASPIRIN 81 MG/1
81 TABLET ORAL DAILY
Status: DISCONTINUED | OUTPATIENT
Start: 2025-07-08 | End: 2025-07-15 | Stop reason: HOSPADM

## 2025-07-07 RX ORDER — DOCUSATE SODIUM 100 MG/1
100 CAPSULE, LIQUID FILLED ORAL 2 TIMES DAILY
Status: DISCONTINUED | OUTPATIENT
Start: 2025-07-07 | End: 2025-07-15 | Stop reason: HOSPADM

## 2025-07-07 RX ORDER — CYCLOBENZAPRINE HCL 10 MG
10 TABLET ORAL NIGHTLY
Status: DISCONTINUED | OUTPATIENT
Start: 2025-07-07 | End: 2025-07-15 | Stop reason: HOSPADM

## 2025-07-07 RX ORDER — CETIRIZINE HYDROCHLORIDE 10 MG/1
10 TABLET ORAL DAILY
Status: DISCONTINUED | OUTPATIENT
Start: 2025-07-08 | End: 2025-07-15 | Stop reason: HOSPADM

## 2025-07-07 RX ORDER — SODIUM CHLORIDE 0.9 % (FLUSH) 0.9 %
10 SYRINGE (ML) INJECTION EVERY 12 HOURS PRN
Status: DISCONTINUED | OUTPATIENT
Start: 2025-07-07 | End: 2025-07-15 | Stop reason: HOSPADM

## 2025-07-07 RX ORDER — PANTOPRAZOLE SODIUM 40 MG/1
40 TABLET, DELAYED RELEASE ORAL DAILY
Status: DISCONTINUED | OUTPATIENT
Start: 2025-07-08 | End: 2025-07-15 | Stop reason: HOSPADM

## 2025-07-07 RX ORDER — IPRATROPIUM BROMIDE AND ALBUTEROL SULFATE 2.5; .5 MG/3ML; MG/3ML
3 SOLUTION RESPIRATORY (INHALATION)
Status: COMPLETED | OUTPATIENT
Start: 2025-07-07 | End: 2025-07-07

## 2025-07-07 RX ORDER — GLUCAGON 1 MG
1 KIT INJECTION
Status: DISCONTINUED | OUTPATIENT
Start: 2025-07-07 | End: 2025-07-15 | Stop reason: HOSPADM

## 2025-07-07 RX ORDER — NALOXONE HCL 0.4 MG/ML
0.02 VIAL (ML) INJECTION
Status: DISCONTINUED | OUTPATIENT
Start: 2025-07-07 | End: 2025-07-15 | Stop reason: HOSPADM

## 2025-07-07 RX ORDER — ARIPIPRAZOLE 2 MG/1
2 TABLET ORAL ONCE
Status: DISCONTINUED | OUTPATIENT
Start: 2025-07-07 | End: 2025-07-07

## 2025-07-07 RX ORDER — HYDROXYZINE PAMOATE 25 MG/1
25 CAPSULE ORAL 2 TIMES DAILY PRN
Status: DISCONTINUED | OUTPATIENT
Start: 2025-07-07 | End: 2025-07-15 | Stop reason: HOSPADM

## 2025-07-07 RX ORDER — OXCARBAZEPINE 300 MG/1
600 TABLET, FILM COATED ORAL 2 TIMES DAILY PRN
Status: DISCONTINUED | OUTPATIENT
Start: 2025-07-07 | End: 2025-07-08

## 2025-07-07 RX ORDER — IBUPROFEN 200 MG
24 TABLET ORAL
Status: DISCONTINUED | OUTPATIENT
Start: 2025-07-07 | End: 2025-07-15 | Stop reason: HOSPADM

## 2025-07-07 RX ORDER — ENOXAPARIN SODIUM 100 MG/ML
40 INJECTION SUBCUTANEOUS EVERY 24 HOURS
Status: DISCONTINUED | OUTPATIENT
Start: 2025-07-07 | End: 2025-07-15 | Stop reason: HOSPADM

## 2025-07-07 RX ORDER — LOSARTAN POTASSIUM 25 MG/1
50 TABLET ORAL DAILY
Status: DISCONTINUED | OUTPATIENT
Start: 2025-07-08 | End: 2025-07-15 | Stop reason: HOSPADM

## 2025-07-07 RX ORDER — FOLIC ACID 1 MG/1
1 TABLET ORAL DAILY
Status: DISCONTINUED | OUTPATIENT
Start: 2025-07-08 | End: 2025-07-15 | Stop reason: HOSPADM

## 2025-07-07 RX ORDER — DILTIAZEM HYDROCHLORIDE 120 MG/1
120 CAPSULE, COATED, EXTENDED RELEASE ORAL DAILY
Status: DISCONTINUED | OUTPATIENT
Start: 2025-07-08 | End: 2025-07-15 | Stop reason: HOSPADM

## 2025-07-07 RX ORDER — CEFEPIME HYDROCHLORIDE 2 G/1
2 INJECTION, POWDER, FOR SOLUTION INTRAVENOUS
Status: DISCONTINUED | OUTPATIENT
Start: 2025-07-07 | End: 2025-07-10

## 2025-07-07 RX ORDER — METOPROLOL SUCCINATE 50 MG/1
100 TABLET, EXTENDED RELEASE ORAL DAILY
Status: DISCONTINUED | OUTPATIENT
Start: 2025-07-08 | End: 2025-07-15 | Stop reason: HOSPADM

## 2025-07-07 RX ORDER — DEXAMETHASONE SODIUM PHOSPHATE 4 MG/ML
8 INJECTION, SOLUTION INTRA-ARTICULAR; INTRALESIONAL; INTRAMUSCULAR; INTRAVENOUS; SOFT TISSUE
Status: COMPLETED | OUTPATIENT
Start: 2025-07-07 | End: 2025-07-07

## 2025-07-07 RX ORDER — CEFTRIAXONE 1 G/1
1 INJECTION, POWDER, FOR SOLUTION INTRAMUSCULAR; INTRAVENOUS
Status: COMPLETED | OUTPATIENT
Start: 2025-07-07 | End: 2025-07-07

## 2025-07-07 RX ADMIN — CEFEPIME 2 G: 2 INJECTION, POWDER, FOR SOLUTION INTRAVENOUS at 06:07

## 2025-07-07 RX ADMIN — DEXAMETHASONE SODIUM PHOSPHATE 8 MG: 4 INJECTION, SOLUTION INTRA-ARTICULAR; INTRALESIONAL; INTRAMUSCULAR; INTRAVENOUS; SOFT TISSUE at 02:07

## 2025-07-07 RX ADMIN — IPRATROPIUM BROMIDE AND ALBUTEROL SULFATE 3 ML: .5; 3 SOLUTION RESPIRATORY (INHALATION) at 02:07

## 2025-07-07 RX ADMIN — AZITHROMYCIN MONOHYDRATE 500 MG: 500 INJECTION, POWDER, LYOPHILIZED, FOR SOLUTION INTRAVENOUS at 04:07

## 2025-07-07 RX ADMIN — CEFTRIAXONE SODIUM 1 G: 1 INJECTION, POWDER, FOR SOLUTION INTRAMUSCULAR; INTRAVENOUS at 04:07

## 2025-07-07 RX ADMIN — ENOXAPARIN SODIUM 40 MG: 40 INJECTION SUBCUTANEOUS at 06:07

## 2025-07-07 RX ADMIN — CYCLOBENZAPRINE 10 MG: 10 TABLET, FILM COATED ORAL at 08:07

## 2025-07-07 RX ADMIN — DOCUSATE SODIUM 100 MG: 100 CAPSULE, LIQUID FILLED ORAL at 08:07

## 2025-07-07 NOTE — ED PROVIDER NOTES
Encounter Date: 7/7/2025       History     Chief Complaint   Patient presents with    Shortness of Breath     Sent from urgent care for SOB, wheezing and R/O UTI     HPI    39-year-old male with a past medical history differential sick ataxia, hypertension, neurogenic bladder status post recent suprapubic catheter presents emergency department for cough and concerns for aspiration.  Family's also concerned for UTI.  States he has been having worsening shortness breath and wheezing for last day.  States that he is more fatigued and not as awake as he normally is.  No recorded fever    Review of patient's allergies indicates:   Allergen Reactions    Amoxicillin Other (See Comments)     Pt states does not know reaction (was a baby at that time)  Mother reports high fever     Past Medical History:   Diagnosis Date    Primary hypertension 6/6/2023    Seizures 2007     Past Surgical History:   Procedure Laterality Date    WISDOM TOOTH EXTRACTION       Family History   Problem Relation Name Age of Onset    Hypertension Mother      Arrhythmia Father      Heart attack Father      Hypertension Father      Arrhythmia Sister      Heart attack Maternal Grandmother      Stroke Maternal Grandmother      Heart attack Maternal Grandfather       Social History[1]  Review of Systems   Constitutional:  Negative for fever.   Respiratory:  Positive for cough, shortness of breath and wheezing.    Cardiovascular:  Negative for chest pain.   Gastrointestinal:  Negative for abdominal pain, constipation, diarrhea, nausea and vomiting.   Neurological:  Negative for headaches.   All other systems reviewed and are negative.      Physical Exam     Initial Vitals [07/07/25 1343]   BP Pulse Resp Temp SpO2   (!) 136/92 76 (!) 24 98.4 °F (36.9 °C) (!) 94 %      MAP       --         Physical Exam    Nursing note and vitals reviewed.  Constitutional: He appears well-developed and well-nourished. No distress.   Cardiovascular:  Normal rate and regular  rhythm.           Pulmonary/Chest: No respiratory distress. He has wheezes. He has rhonchi (more so to the right).   Abdominal: Abdomen is soft. There is no abdominal tenderness.   Suprapubic catheter   Musculoskeletal:         General: No tenderness. Normal range of motion.     Neurological: He is alert and oriented to person, place, and time.   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 Capillary refill takes less than 2 seconds.         ED Course   Procedures  Labs Reviewed   COMPREHENSIVE METABOLIC PANEL - Abnormal       Result Value    Sodium 131 (*)     Potassium 3.7      Chloride 96 (*)     CO2 26      Glucose 104 (*)     Blood Urea Nitrogen 9.8      Creatinine 0.92      Calcium 9.5      Protein Total 8.3      Albumin 3.9      Globulin 4.4 (*)     Albumin/Globulin Ratio 0.9 (*)     Bilirubin Total 0.4            ALT 24      AST 20      eGFR >60      Anion Gap 9.0      BUN/Creatinine Ratio 11     URINALYSIS, REFLEX TO URINE CULTURE - Abnormal    Color, UA Yellow      Appearance, UA Turbid (*)     Specific Gravity, UA 1.022      pH, UA 7.0      Protein, UA 2+ (*)     Glucose, UA Normal      Ketones, UA Negative      Blood, UA 3+ (*)     Bilirubin, UA Negative      Urobilinogen, UA Normal      Nitrites, UA Negative      Leukocyte Esterase,  (*)     RBC, UA >100 (*)     WBC, UA >100 (*)     WBC Clumps, UA Few (*)     Bacteria, UA Many (*)     Squamous Epithelial Cells, UA Trace (*)     Mucous, UA Occasional (*)     Hyaline Casts, UA None Seen     BLOOD GAS - Abnormal    Sample Type Venous Blood      Drawn by LAB      pH, Blood gas 7.420 (*)     pCO2, Blood gas 46.0      pO2, Blood gas 62.0 (*)     TOC2, Blood gas 31.2      Base Excess, Blood gas 4.30      sO2, Blood gas 94.2      HCO3, Blood gas 29.8      THb, Blood gas 17.2      O2 Hb, Blood Gas 90.7      CO Hgb 2.9      Met Hgb 0.8      Allens Test N/A     B-TYPE NATRIURETIC PEPTIDE - Normal    Natriuretic  Peptide 32.0     LACTIC ACID, PLASMA - Normal    Lactic Acid Level 1.8     BLOOD CULTURE OLG   BLOOD CULTURE OLG   CULTURE, URINE   CBC W/ AUTO DIFFERENTIAL    Narrative:     The following orders were created for panel order CBC auto differential.  Procedure                               Abnormality         Status                     ---------                               -----------         ------                     CBC with Differential[[id number]]                           Final result                 Please view results for these tests on the individual orders.   CBC WITH DIFFERENTIAL    WBC 8.59      RBC 5.76      Hgb 16.4      Hct 47.8      MCV 83.0      MCH 28.5      MCHC 34.3      RDW 13.0      Platelet 329      MPV 8.6      Neut % 59.6      Lymph % 22.2      Mono % 10.0      Eos % 7.2      Basophil % 0.8      Imm Grans % 0.2      Neut # 5.11      Lymph # 1.91      Mono # 0.86      Eos # 0.62      Baso # 0.07      Imm Gran # 0.02      NRBC% 0.0     EXTRA TUBES    Narrative:     The following orders were created for panel order EXTRA TUBES.  Procedure                               Abnormality         Status                     ---------                               -----------         ------                     Light Blue Top Hold[[id number]]                             Final result               Red Top Hold[[id number]]                                    Final result               Light Green Top Hold[[id number]]                            Final result               Gold Top Hold[[id number]]                                   Final result               Pink Top Hold[[id number]]                                   Final result                 Please view results for these tests on the individual orders.   LIGHT BLUE TOP HOLD    Extra Tube Hold for add-ons.     RED TOP HOLD    Extra Tube Hold for add-ons.     LIGHT GREEN TOP HOLD    Extra Tube Hold for add-ons.     GOLD TOP HOLD    Extra Tube Hold for add-ons.      PINK TOP HOLD    Extra Tube Hold for add-ons.     RESPIRATORY PANEL          Imaging Results              CT Chest Without Contrast (Final result)  Result time 07/07/25 15:58:18      Final result by Jessica Arauz MD (07/07/25 15:58:18)                   Impression:      Bilateral lower lobe pneumonia appears acute      Electronically signed by: Long Arauz  Date:    07/07/2025  Time:    15:58               Narrative:    EXAMINATION:  CT CHEST WITHOUT CONTRAST    CLINICAL HISTORY:  Aspiration;Respiratory illness, nondiagnostic xray;    TECHNIQUE:  Low dose axial images, sagittal and coronal reformations were obtained from the thoracic inlet to the lung bases. Contrast was not administered.  Automatic exposure control is utilized to reduce patient radiation exposure.    COMPARISON:  07/07/2025 chest x-ray    FINDINGS:  There is a bilateral lower lobe interstitial pneumonia.  This is slightly worse on the right.  There is developing consolidation in the right lower lobe.  No mass is seen.  No lesion is seen.  No pleural effusion is seen.  No pleural thickening is seen.  The mediastinum appears grossly unremarkable.  No abnormal lymphadenopathy is seen.  Thoracic aorta appears grossly unremarkable.  Heart appears normal.    Visualized portions of the upper abdomen show no acute abnormality.                                       X-Ray Chest 1 View (Final result)  Result time 07/07/25 15:02:55      Final result by Mejia Ordonez MD (07/07/25 15:02:55)                   Impression:      Findings suggestive of pulmonary edema or atypical infection      Electronically signed by: Mejia Ordonez MD  Date:    07/07/2025  Time:    15:02               Narrative:    EXAMINATION:  XR CHEST 1 VIEW    CLINICAL HISTORY:  Cough, unspecified    COMPARISON:  07/29/2021    FINDINGS:  Single view of the chest shows central vascular congestion with prominent interstitial opacities in the mid lower lungs.  No pneumothorax or  large effusion.  Heart is upper normal in size.                                       Medications   azithromycin (ZITHROMAX) 500 mg in 0.9% NaCl 250 mL IVPB (admixture device) (500 mg Intravenous New Bag 25 1625)   dexAMETHasone injection 8 mg (8 mg Intravenous Given 25 1443)   albuterol-ipratropium 2.5 mg-0.5 mg/3 mL nebulizer solution 3 mL (3 mLs Nebulization Given 25 1437)   cefTRIAXone injection 1 g (1 g Intravenous Given 25 1618)     Medical Decision Making  Problems Addressed:  Complicated UTI (urinary tract infection): acute illness or injury  Pneumonia of both lower lobes due to infectious organism: acute illness or injury    Amount and/or Complexity of Data Reviewed  Labs: ordered. Decision-making details documented in ED Course.  Radiology: ordered.    Risk  Prescription drug management.  Decision regarding hospitalization.               ED Course as of 25   162 Medicine agrees with admit [BS]   1621 WBC, UA(!): >100 [BS]      ED Course User Index  [BS] Alessio Lyons MD                           Clinical Impression:  Final diagnoses:  [R05.9] Cough  [J18.9] Pneumonia of both lower lobes due to infectious organism (Primary)  [R09.02] Hypoxia  [N39.0] Complicated UTI (urinary tract infection)          ED Disposition Condition    Admit                     Alessio Lyons MD  25 162         [1]   Social History  Tobacco Use    Smoking status: Former     Current packs/day: 0.00     Average packs/day: 0.3 packs/day for 10.0 years (2.5 ttl pk-yrs)     Types: Cigarettes     Start date: 1/15/2010     Quit date: 3/25/2014     Years since quittin.2     Passive exposure: Current    Smokeless tobacco: Never   Substance Use Topics    Alcohol use: No    Drug use: No        Alessio Lyons MD  25 [id number]

## 2025-07-07 NOTE — CARE UPDATE
Transition of Care Progress Note      Dr. Burgess and I have received checkout from Dr. Davis regarding this patient.     HO1 assessment:  Admission diagnosis: Pneumonia of both lower lobes due to infectious organism [J18.9]       Overnight management:   Monitor vitals. PM meds as scheduled. Cefepime q8h x 14 d (1/14).     Code status:   Full    Please call [phone] from 07/07/2025 4PM to 07/08/2025 7AM if any issues arise.    Jovany Ruiz MD  [facility], [facility], HO-[facility]  07/07/2025

## 2025-07-07 NOTE — PROGRESS NOTES
"Subjective:       Patient ID: Anuj Caldwell is a 39 y.o. male.    Vitals:  height is 5' 2" (1.575 m) and weight is 87.1 kg (192 lb). His temperature is 98.1 °F (36.7 °C). His blood pressure is 115/80 and his pulse is 79. His respiration is 20 and oxygen saturation is 92% (abnormal).     Chief Complaint: Hematuria (Possible hematuria, dark urine several days. Also c/o wheezing since Thursday.)    39-year-old male presents to the clinic with complaints of wheezing and difficulty breathing that began on Thursday and hematuria with dark urine for several days.  Patient's home health nurse accompanies him to the visit as well as his mother and they report that labored breathing started yesterday and continued today.  Patient's home health nurses concern for aspiration pneumonia as patient has had "a few choking episodes" over the last week.  Patient's mother states that patient normally does not require home oxygen and states that patient has been complaining of shortness of breath for the last 2 days.    All other systems are negative    Chart reviewed    Objective:   Physical Exam   Constitutional: He appears well-developed.  Non-toxic appearance. He does not appear ill. No distress.   HENT:   Head: Atraumatic.   Nose: No purulent discharge. Right sinus exhibits no maxillary sinus tenderness and no frontal sinus tenderness. Left sinus exhibits no maxillary sinus tenderness and no frontal sinus tenderness.   Mouth/Throat: Uvula is midline.   Eyes: Right eye exhibits no discharge. Left eye exhibits no discharge. Extraocular movement intact   Neck: Neck supple. No neck rigidity present.   Cardiovascular: Regular rhythm.   Pulmonary/Chest: Accessory muscle usage present. Tachypnea noted. No respiratory distress. He has wheezes in the right upper field, the right middle field, the right lower field, the left upper field, the left middle field and the left lower field. He has rhonchi in the right lower field and the left " lower field. He has no rales. He exhibits retraction.   Lymphadenopathy:     He has no cervical adenopathy.   Neurological: He is alert.   Skin: Skin is warm, dry and not diaphoretic.   Psychiatric: His behavior is normal.   Nursing note and vitals reviewed.        Assessment:     1. Labored breathing    2. Wheezing    3. Tachypnea            Plan:     Discussed that further evaluation due to labored breathing and retractions is needed at the emergency room  Patient's home health nurse and mother refused ambulance services to ER and states they will escort the patient across the parking lot to the emergency room  Report called to [facility] ER to YECENIA Peres by nurse practitioner    Labored breathing    Wheezing    Tachypnea        Please note: This chart was completed via voice to text dictation. It may contain typographical/word recognition errors. If there are any questions, please contact the provider for final clarification.

## 2025-07-07 NOTE — H&P
[facility] Wards History & Physical Note     Resident Team: [facility] Family Medicine List   Attending Physician: Laura Calles MD  Resident: Axel Davis MD , Dr. Mccord     Date of Admit: 7/7/2025    Chief Complaint     Shortness of Breath (Sent from urgent care for SOB, wheezing and R/O UTI)       Subjective:      History of Present Illness:  Anuj Caldwell is a 39 y.o.  male with a history of Friedreich's ataxia, neurogenic bladder, and hypertension who presented on 7/7/2025  with c/o  dark urine and cough onset 1 week ago.  Mother states the patient's urine has been dark, with visible sediment.  Patient has a suprapubic catheter in place . He has been coughing significantly more.  Cough has produced a pale yellow sputum. .  There was an occasion about week ago where the patient did spit up some meat, which least 2 mother feeling some concern for aspiration.  He has not had any fevers at home, he denies any abdominal pain.  There has been no nausea, vomiting, or diarrhea.  Of note, patient had urine cultures positive for Pseudomonas and Klebsiella in March of 2024.    In the ED, initial pulse was 79, blood pressure 136/92, temperature 98.4° F, satting 94% on room air.  Oxygen began to desaturate to the low 90s he was placed on 2 L nasal cannula.  Patient was also treated with DuoNebs.  Saturations began to be in the mid to high 90s on 2 L. white count was unremarkable at 8.59.  H&H was normal.  Sodium 131, potassium 3.7, all other electrolytes within normal limits.  Lactic acid was found to be normal, as was the BNP.  His urine was turbid, had 2+ protein, 3+ glucose, 500 leuks esterase, and greater than 100 RBCs and white blood cells.  Many bacteria were also noted.  The ED did draw 2 sets of blood cultures and urine culture.  Says x-ray and chest CT were done and showed evidence of potential atypical infection bilaterally.  Family medicine inpatient team was consulted for admission.      Past Medical  History:  Past Medical History:   Diagnosis Date    Primary hypertension 6/6/2023    Seizures 2007       Past Surgical History:  Past Surgical History:   Procedure Laterality Date    WISDOM TOOTH EXTRACTION         Family History:  Family History   Problem Relation Name Age of Onset    Hypertension Mother      Arrhythmia Father      Heart attack Father      Hypertension Father      Arrhythmia Sister      Heart attack Maternal Grandmother      Stroke Maternal Grandmother      Heart attack Maternal Grandfather         Social History:  Social History[1]    Allergies:  Review of patient's allergies indicates:   Allergen Reactions    Amoxicillin Other (See Comments)     Pt states does not know reaction (was a baby at that time)  Mother reports high fever       Home Medications:  Prior to Admission medications    Medication Sig Start Date End Date Taking? Authorizing Provider   aspirin (ECOTRIN) 81 MG EC tablet Take 1 tablet (81 mg total) by mouth once daily. 11/20/15   Fady Noyola MD   brexpiprazole (REXULTI) 2 mg Tab Take 2 mg by mouth.    Provider, Historical   cetirizine (ZYRTEC) 10 MG tablet Take 1 tablet (10 mg total) by mouth once daily. 5/31/24 10/18/24  Noemi Martin MD   cyclobenzaprine (FLEXERIL) 10 MG tablet Take 1 tablet (10 mg total) by mouth every evening. 3/18/25   Kang Merrill MD   diltiaZEM (DILACOR XR) 240 MG CDCR Take 1 capsule (240 mg total) by mouth 2 (two) times a day. 6/6/23   Noemi Martin MD   docusate sodium (COLACE) 100 MG capsule Take 100 mg by mouth 2 (two) times daily.    Provider, Historical   fluticasone propionate (FLONASE) 50 mcg/actuation nasal spray 2 sprays (100 mcg total) by Each Nostril route 2 (two) times daily. 3/22/24   Madison Fowler FNP   folic acid (FOLVITE) 1 MG tablet Take 1 mg by mouth once daily.    Provider, Historical   hydrOXYzine pamoate (VISTARIL) 25 MG Cap Take 25 mg by mouth 2 (two) times daily as needed.    Provider, Historical   levoFLOXacin (LEVAQUIN)  750 MG tablet Take 1 tablet (750 mg total) by mouth once daily.  Patient not taking: Reported on 3/22/2024 10/20/23   Dwain Monk NP   losartan (COZAAR) 50 MG tablet Take 50 mg by mouth once daily.    Provider, Historical   metoprolol succinate (TOPROL-XL) 25 MG 24 hr tablet Take 100 mg by mouth once daily at 6am.    Provider, Historical   mirtazapine (REMERON) 30 MG tablet Take 1 tablet (30 mg total) by mouth every evening. 9/27/22   Noemi Martin MD   mupirocin (BACTROBAN) 2 % ointment Apply topically 2 (two) times daily. 1/9/25   Kang Merrill MD   ondansetron (ZOFRAN-ODT) 8 MG TbDL Take 1 tablet (8 mg total) by mouth 2 (two) times daily. 10/15/24   Evelia Osorio, DO   OXcarbazepine (TRILEPTAL) 600 MG Tab Take 1 tablet (600 mg total) by mouth 2 (two) times daily as needed (Friedreich ataxia). 10/15/24   Evelia Osorio,    pantoprazole (PROTONIX) 40 MG tablet Take 1 tablet (40 mg total) by mouth once daily. 3/18/25   Kang Merrill MD   phenazopyridine (PYRIDIUM) 200 MG tablet Take 200 mg by mouth daily as needed.  Patient not taking: Reported on 7/7/2025 1/14/22   Provider, Historical   tacrolimus (PROTOPIC) 0.1 % ointment Apply topically 2 (two) times daily.  Patient not taking: Reported on 7/7/2025 8/16/24   Thien Giordano MD   triamcinolone acetonide 0.1% (KENALOG) 0.1 % cream Apply topically 2 (two) times daily. 8/16/24   Thien Giordano MD   UNABLE TO FIND DME name: Hospital bed mattress  Duration: 99 months  Diagnosis: Friedreich ataxia, code: G11.11 3/20/25   Kang Merrill MD   UNABLE TO FIND Hospital bed mattress, length of need -  99, dx CodeG11.11 Fredreich 's Ataxia 3/21/25   Luli Mata MD   UNABLE TO FIND Hospital bed mattress,length of need -  99, dx CodeG11.11 Fredreich 's Ataxia 3/21/25   Luli Mata MD   UNABLE TO FIND Diagnosis: Friedreich's Ataxia  DME: Wheel chair  Duration: Lifetime  Specification:    -Head and neck rest   -with tilt mechanism   -adult regular size to  "fit patient's need. 25   Kang Merrill MD   vortioxetine (TRINTELLIX) 20 mg Tab Take 20 mg by mouth.    Provider, Historical         Review of Systems:  Review of Systems   Constitutional:  Positive for malaise/fatigue. Negative for chills and fever.   Respiratory:  Positive for cough and wheezing. Negative for hemoptysis.    Cardiovascular:  Negative for chest pain, palpitations and leg swelling.   Gastrointestinal:  Negative for abdominal pain, constipation, diarrhea, nausea and vomiting.   Genitourinary:  Negative for dysuria, hematuria and urgency.   Neurological:  Negative for loss of consciousness.             Objective:   Last 24 Hour Vital Signs:  BP  Min: 115/80  Max: 136/92  Temp  Av.3 °F (36.8 °C)  Min: 98.1 °F (36.7 °C)  Max: 98.4 °F (36.9 °C)  Pulse  Av.8  Min: 76  Max: 89  Resp  Av  Min: 16  Max: 24  SpO2  Av %  Min: 90 %  Max: 94 %  Height  Av' 2" (157.5 cm)  Min: 5' 2" (157.5 cm)  Max: 5' 2" (157.5 cm)  Weight  Av.6 kg (190 lb 13.6 oz)  Min: 86 kg (189 lb 11.2 oz)  Max: 87.1 kg (192 lb)  Body mass index is 34.7 kg/m².  No intake/output data recorded.    Physical Examination:  Physical Exam  Vitals and nursing note reviewed.   Constitutional:       Appearance: He is not ill-appearing.   HENT:      Head: Normocephalic.      Right Ear: External ear normal.      Left Ear: External ear normal.      Mouth/Throat:      Mouth: Mucous membranes are moist.      Pharynx: No posterior oropharyngeal erythema.   Eyes:      Comments: saccadic eye movements   Cardiovascular:      Rate and Rhythm: Normal rate and regular rhythm.      Heart sounds: No murmur heard.  Pulmonary:      Effort: No respiratory distress.      Breath sounds: Wheezing and rhonchi present.      Comments: Nasal cannula in place. Dry cough during the exam. Left sided wheezes > right side. Right side crackles > left side.   Abdominal:      General: There is distension.      Tenderness: There is no abdominal " "tenderness. There is no guarding.      Hernia: No hernia is present.      Comments: Tympanic to percussion .  Suprapubic catheter in place.  No surrounding erythema or tenderness.  Dark edwar urine being collected at bedside   Skin:     General: Skin is warm.      Capillary Refill: Capillary refill takes less than 2 seconds.   Neurological:      Mental Status: He is oriented to person, place, and time.   Psychiatric:         Mood and Affect: Mood normal.         Thought Content: Thought content normal.          Laboratory:  Most Recent Data:  CBC:   Lab Results   Component Value Date    WBC 8.59 07/07/2025    HGB 16.4 07/07/2025    HCT 47.8 07/07/2025     07/07/2025    MCV 83.0 07/07/2025    RDW 13.0 07/07/2025     WBC Differential:   Recent Labs   Lab 07/07/25  1427   WBC 8.59   HGB 16.4   HCT 47.8      MCV 83.0     BMP:   Lab Results   Component Value Date     (L) 07/07/2025    K 3.7 07/07/2025    CL 96 (L) 07/07/2025    CO2 26 07/07/2025    BUN 9.8 07/07/2025    CREATININE 0.92 07/07/2025     (H) 07/07/2025    CALCIUM 9.5 07/07/2025    MG 2.40 07/31/2021    PHOS 3.3 07/26/2021     LFTs:   Lab Results   Component Value Date    PROT 8.3 07/07/2025    ALBUMIN 3.9 07/07/2025    BILITOT 0.4 07/07/2025    AST 20 07/07/2025    ALKPHOS 139 07/07/2025    ALT 24 07/07/2025     Coags:   Lab Results   Component Value Date    INR 1.0 05/28/2024    PROTIME 13.1 02/24/2021     FLP:   Lab Results   Component Value Date    CHOL 181 02/26/2021    HDL 41 02/26/2021    TRIG 199 (H) 02/26/2021     DM:   Lab Results   Component Value Date    HGBA1C 5.1 06/06/2023    CREATININE 0.92 07/07/2025     Thyroid:   Lab Results   Component Value Date    TSH 0.5214 07/30/2021      Anemia: No results found for: "IRON", "TIBC", "FERRITIN", "SATURATEDIRO"  No results found for: "[id number]"  No results found for: "FOLATE"     Cardiac:   Lab Results   Component Value Date    TROPONINI 0.26 (H) 06/13/2024    CKTOTAL 289 " (H) 09/04/2015    CKMB 0.5 09/04/2015    BNP 32.0 07/07/2025     Urinalysis:   Lab Results   Component Value Date    LABURIN >/= 100,000 colonies/ml Pseudomonas aeruginosa (A) 03/08/2024    LABURIN (A) 03/08/2024     >/= 100,000 colonies/ml Klebsiella pneumoniae ssp pneumoniae    COLORU Yellow 07/07/2025    SPECGRAV 1.025 09/25/2023    NITRITE Negative 07/07/2025    KETONESU Negative 09/25/2023    UROBILINOGEN Normal 07/07/2025    WBCUA >100 (A) 07/07/2025       Trended Lab Data:  Recent Labs   Lab 07/07/25  1427   WBC 8.59   HGB 16.4   HCT 47.8      MCV 83.0   RDW 13.0   *   K 3.7   CL 96*   CO2 26   BUN 9.8   CREATININE 0.92   *   PROT 8.3   ALBUMIN 3.9   BILITOT 0.4   AST 20   ALKPHOS 139   ALT 24       Trended Cardiac Data:  Recent Labs   Lab 07/07/25 1427   BNP 32.0       Microbiology Data:  Microbiology Results (last 7 days)       Procedure Component Value Units Date/Time    Urine culture [[id number]] Collected: 07/07/25 1429    Order Status: Sent Specimen: Urine, Supra Pubic Updated: 07/07/25 1509    Blood culture #2 **CANNOT BE ORDERED STAT** [[id number]] Collected: 07/07/25 1427    Order Status: Sent Specimen: Blood from Antecubital, Left Updated: 07/07/25 1444    Blood culture #1 **CANNOT BE ORDERED STAT** [[id number]] Collected: 07/07/25 1411    Order Status: Sent Specimen: Blood from Antecubital, Right Updated: 07/07/25 1444             Other Results:  EKG:   Results for orders placed or performed during the hospital encounter of 01/17/17   EKG 12-lead    Collection Time: 01/17/17  2:24 PM    Narrative    Test Reason : I48.92  Blood Pressure : mmHG  Vent. Rate : 081 BPM     Atrial Rate : 081 BPM     P-R Int : 178 ms          QRS Dur : 080 ms      QT Int : 376 ms       P-R-T Axes : 052 077 024 degrees     QTc Int : 436 ms    Normal sinus rhythm  Normal ECG  When compared with ECG of 03-JUN-2016 10:32,  No significant change was found  Confirmed by Ernestina Noyola MD ([id number]) on 1/17/2017  4:10:31 PM    Referred By: ASHWIN JOSHUA           Confirmed By:Ernestina Joshua MD       Radiology:  Imaging Results              CT Chest Without Contrast (Final result)  Result time 07/07/25 15:58:18      Final result by Jessica Arauz MD (07/07/25 15:58:18)                   Impression:      Bilateral lower lobe pneumonia appears acute      Electronically signed by: Long Arauz  Date:    07/07/2025  Time:    15:58               Narrative:    EXAMINATION:  CT CHEST WITHOUT CONTRAST    CLINICAL HISTORY:  Aspiration;Respiratory illness, nondiagnostic xray;    TECHNIQUE:  Low dose axial images, sagittal and coronal reformations were obtained from the thoracic inlet to the lung bases. Contrast was not administered.  Automatic exposure control is utilized to reduce patient radiation exposure.    COMPARISON:  07/07/2025 chest x-ray    FINDINGS:  There is a bilateral lower lobe interstitial pneumonia.  This is slightly worse on the right.  There is developing consolidation in the right lower lobe.  No mass is seen.  No lesion is seen.  No pleural effusion is seen.  No pleural thickening is seen.  The mediastinum appears grossly unremarkable.  No abnormal lymphadenopathy is seen.  Thoracic aorta appears grossly unremarkable.  Heart appears normal.    Visualized portions of the upper abdomen show no acute abnormality.                                       X-Ray Chest 1 View (Final result)  Result time 07/07/25 15:02:55      Final result by Mejia Ordonez MD (07/07/25 15:02:55)                   Impression:      Findings suggestive of pulmonary edema or atypical infection      Electronically signed by: Mejia Ordonez MD  Date:    07/07/2025  Time:    15:02               Narrative:    EXAMINATION:  XR CHEST 1 VIEW    CLINICAL HISTORY:  Cough, unspecified    COMPARISON:  07/29/2021    FINDINGS:  Single view of the chest shows central vascular congestion with prominent interstitial opacities in the mid lower lungs.  No  pneumothorax or large effusion.  Heart is upper normal in size.                                         Assessment & Plan:     Urinary tract infection  - dark urine noted at home and on exam  -urinalysis showing turbid urine with blood, leuks esterase, white blood cells, and bacteria  - prior cultures grew Pseudomonas and Klebsiella  - Those organisms were sensitive to cefepime, Zosyn, meropenem, and tobramycin.  - We will start patient on cefepime, as patient does have a documented penicillin allergy  - urine cultures pending  - P CBC and CMP in the morning    Cough  - differential includes viral illness, COVID, atypical pneumonia  - chest x-ray and CT chest consistent with this   Chest CT showed bilateral lower lobe infiltrates  - patient already on cefepime, which has good strep and staph coverage (though no MRSA)  - we will also continue the patient on azithromycin for atypical coverage  - blood cultures are pending x2    Friedreich's ataxia  -chronic condition, has multiple psych medications at home  -mother states she will bring her son psychiatry medicines and tomorrow  -subacute catheter is in place, will need repeated after this urine infection resolved is treated  - continue his home folic acid, docusate, cyclobenzaprine, and Zyrtec  - Flonase also been able  -    Hypertension  - blood pressure 121/91 most recently  - patient taking diltiazem 120 mg, and losartan 50 mg, and metoprolol succinate 100 mg  -restart patient's home blood pressure medications      CODE STATUS:  Full code  Diet:  Soft, bite size diet  DVT Prophylaxis:   Anticoagulants   Medication Route Frequency    enoxaparin injection 40 mg Subcutaneous Daily   GI prophylaxis given with pantoprazole 40 mg q.d.          Axel Davis MD  [facility] HO-3               [1]   Social History  Tobacco Use    Smoking status: Former     Current packs/day: 0.00     Average packs/day: 0.3 packs/day for 10.0 years (2.5 ttl pk-yrs)     Types:  Cigarettes     Start date: 1/15/2010     Quit date: 3/25/2014     Years since quittin.2     Passive exposure: Current    Smokeless tobacco: Never   Substance Use Topics    Alcohol use: No    Drug use: No

## 2025-07-08 LAB
ALBUMIN SERPL-MCNC: 3.6 G/DL (ref 3.5–5)
ALBUMIN/GLOB SERPL: 0.8 RATIO (ref 1.1–2)
ALP SERPL-CCNC: 123 UNIT/L (ref 40–150)
ALT SERPL-CCNC: 21 UNIT/L (ref 0–55)
ANION GAP SERPL CALC-SCNC: 11 MEQ/L
AST SERPL-CCNC: 19 UNIT/L (ref 11–45)
BASOPHILS # BLD AUTO: 0.01 X10(3)/MCL
BASOPHILS NFR BLD AUTO: 0.1 %
BILIRUB SERPL-MCNC: 0.5 MG/DL
BUN SERPL-MCNC: 8.9 MG/DL (ref 8.9–20.6)
CALCIUM SERPL-MCNC: 9.1 MG/DL (ref 8.4–10.2)
CHLORIDE SERPL-SCNC: 98 MMOL/L (ref 98–107)
CO2 SERPL-SCNC: 20 MMOL/L (ref 22–29)
CREAT SERPL-MCNC: 0.77 MG/DL (ref 0.72–1.25)
CREAT/UREA NIT SERPL: 12
EOSINOPHIL # BLD AUTO: 0 X10(3)/MCL (ref 0–0.9)
EOSINOPHIL NFR BLD AUTO: 0 %
ERYTHROCYTE [DISTWIDTH] IN BLOOD BY AUTOMATED COUNT: 12.9 % (ref 11.5–17)
GFR SERPLBLD CREATININE-BSD FMLA CKD-EPI: >60 ML/MIN/1.73/M2
GLOBULIN SER-MCNC: 4.3 GM/DL (ref 2.4–3.5)
GLUCOSE SERPL-MCNC: 124 MG/DL (ref 74–100)
HCT VFR BLD AUTO: 45.7 % (ref 42–52)
HGB BLD-MCNC: 15.8 G/DL (ref 14–18)
IMM GRANULOCYTES # BLD AUTO: 0.02 X10(3)/MCL (ref 0–0.04)
IMM GRANULOCYTES NFR BLD AUTO: 0.2 %
LYMPHOCYTES # BLD AUTO: 1.07 X10(3)/MCL (ref 0.6–4.6)
LYMPHOCYTES NFR BLD AUTO: 12 %
MCH RBC QN AUTO: 28.4 PG (ref 27–31)
MCHC RBC AUTO-ENTMCNC: 34.6 G/DL (ref 33–36)
MCV RBC AUTO: 82.2 FL (ref 80–94)
MONOCYTES # BLD AUTO: 0.12 X10(3)/MCL (ref 0.1–1.3)
MONOCYTES NFR BLD AUTO: 1.3 %
MRSA PCR SCRN (OHS): NOT DETECTED
NEUTROPHILS # BLD AUTO: 7.71 X10(3)/MCL (ref 2.1–9.2)
NEUTROPHILS NFR BLD AUTO: 86.4 %
NRBC BLD AUTO-RTO: 0 %
PLATELET # BLD AUTO: 352 X10(3)/MCL (ref 130–400)
PMV BLD AUTO: 9 FL (ref 7.4–10.4)
POTASSIUM SERPL-SCNC: 4.4 MMOL/L (ref 3.5–5.1)
PROT SERPL-MCNC: 7.9 GM/DL (ref 6.4–8.3)
RBC # BLD AUTO: 5.56 X10(6)/MCL (ref 4.7–6.1)
SODIUM SERPL-SCNC: 129 MMOL/L (ref 136–145)
WBC # BLD AUTO: 8.93 X10(3)/MCL (ref 4.5–11.5)

## 2025-07-08 PROCEDURE — 25000003 PHARM REV CODE 250: Performed by: FAMILY MEDICINE

## 2025-07-08 PROCEDURE — 11000001 HC ACUTE MED/SURG PRIVATE ROOM

## 2025-07-08 PROCEDURE — 25000242 PHARM REV CODE 250 ALT 637 W/ HCPCS

## 2025-07-08 PROCEDURE — 63600175 PHARM REV CODE 636 W HCPCS

## 2025-07-08 PROCEDURE — 85025 COMPLETE CBC W/AUTO DIFF WBC: CPT

## 2025-07-08 PROCEDURE — 92611 MOTION FLUOROSCOPY/SWALLOW: CPT

## 2025-07-08 PROCEDURE — 87641 MR-STAPH DNA AMP PROBE: CPT

## 2025-07-08 PROCEDURE — 21400001 HC TELEMETRY ROOM

## 2025-07-08 PROCEDURE — 25000003 PHARM REV CODE 250

## 2025-07-08 PROCEDURE — 36415 COLL VENOUS BLD VENIPUNCTURE: CPT

## 2025-07-08 PROCEDURE — 94640 AIRWAY INHALATION TREATMENT: CPT

## 2025-07-08 PROCEDURE — 94761 N-INVAS EAR/PLS OXIMETRY MLT: CPT

## 2025-07-08 PROCEDURE — 27000221 HC OXYGEN, UP TO 24 HOURS

## 2025-07-08 PROCEDURE — 80053 COMPREHEN METABOLIC PANEL: CPT

## 2025-07-08 RX ORDER — METRONIDAZOLE 500 MG/100ML
500 INJECTION, SOLUTION INTRAVENOUS
Status: DISCONTINUED | OUTPATIENT
Start: 2025-07-08 | End: 2025-07-14

## 2025-07-08 RX ORDER — MUPIROCIN 20 MG/G
OINTMENT TOPICAL 2 TIMES DAILY
Status: COMPLETED | OUTPATIENT
Start: 2025-07-08 | End: 2025-07-12

## 2025-07-08 RX ORDER — MIRTAZAPINE 15 MG/1
15 TABLET, FILM COATED ORAL NIGHTLY
Status: DISCONTINUED | OUTPATIENT
Start: 2025-07-08 | End: 2025-07-15 | Stop reason: HOSPADM

## 2025-07-08 RX ORDER — OXCARBAZEPINE 300 MG/1
600 TABLET, FILM COATED ORAL 2 TIMES DAILY PRN
Status: CANCELLED | OUTPATIENT
Start: 2025-07-08

## 2025-07-08 RX ORDER — GUAIFENESIN 100 MG/5ML
200 LIQUID ORAL EVERY 4 HOURS PRN
Status: DISCONTINUED | OUTPATIENT
Start: 2025-07-08 | End: 2025-07-15 | Stop reason: HOSPADM

## 2025-07-08 RX ORDER — OXCARBAZEPINE 150 MG/1
600 TABLET, FILM COATED ORAL 2 TIMES DAILY
Status: DISCONTINUED | OUTPATIENT
Start: 2025-07-08 | End: 2025-07-15 | Stop reason: HOSPADM

## 2025-07-08 RX ORDER — IPRATROPIUM BROMIDE AND ALBUTEROL SULFATE 2.5; .5 MG/3ML; MG/3ML
3 SOLUTION RESPIRATORY (INHALATION)
Status: DISPENSED | OUTPATIENT
Start: 2025-07-08 | End: 2025-07-09

## 2025-07-08 RX ADMIN — DOCUSATE SODIUM 100 MG: 100 CAPSULE, LIQUID FILLED ORAL at 09:07

## 2025-07-08 RX ADMIN — METRONIDAZOLE 500 MG: 5 INJECTION, SOLUTION INTRAVENOUS at 06:07

## 2025-07-08 RX ADMIN — PANTOPRAZOLE SODIUM 40 MG: 40 TABLET, DELAYED RELEASE ORAL at 09:07

## 2025-07-08 RX ADMIN — CETIRIZINE HYDROCHLORIDE 10 MG: 10 TABLET, FILM COATED ORAL at 09:07

## 2025-07-08 RX ADMIN — FOLIC ACID 1 MG: 1 TABLET ORAL at 09:07

## 2025-07-08 RX ADMIN — CEFEPIME 2 G: 2 INJECTION, POWDER, FOR SOLUTION INTRAVENOUS at 05:07

## 2025-07-08 RX ADMIN — LOSARTAN POTASSIUM 50 MG: 25 TABLET, FILM COATED ORAL at 09:07

## 2025-07-08 RX ADMIN — METRONIDAZOLE 500 MG: 5 INJECTION, SOLUTION INTRAVENOUS at 10:07

## 2025-07-08 RX ADMIN — METOPROLOL SUCCINATE 100 MG: 50 TABLET, EXTENDED RELEASE ORAL at 09:07

## 2025-07-08 RX ADMIN — MUPIROCIN: 20 OINTMENT TOPICAL at 10:07

## 2025-07-08 RX ADMIN — FLUTICASONE PROPIONATE 100 MCG: 50 SPRAY, METERED NASAL at 10:07

## 2025-07-08 RX ADMIN — DOCUSATE SODIUM 100 MG: 100 CAPSULE, LIQUID FILLED ORAL at 08:07

## 2025-07-08 RX ADMIN — ENOXAPARIN SODIUM 40 MG: 40 INJECTION SUBCUTANEOUS at 04:07

## 2025-07-08 RX ADMIN — MIRTAZAPINE 15 MG: 7.5 TABLET, FILM COATED ORAL at 08:07

## 2025-07-08 RX ADMIN — FLUTICASONE PROPIONATE 100 MCG: 50 SPRAY, METERED NASAL at 08:07

## 2025-07-08 RX ADMIN — AZITHROMYCIN MONOHYDRATE 500 MG: 500 INJECTION, POWDER, LYOPHILIZED, FOR SOLUTION INTRAVENOUS at 09:07

## 2025-07-08 RX ADMIN — OXCARBAZEPINE 600 MG: 300 TABLET, FILM COATED ORAL at 06:07

## 2025-07-08 RX ADMIN — CEFEPIME 2 G: 2 INJECTION, POWDER, FOR SOLUTION INTRAVENOUS at 09:07

## 2025-07-08 RX ADMIN — IPRATROPIUM BROMIDE AND ALBUTEROL SULFATE 3 ML: .5; 3 SOLUTION RESPIRATORY (INHALATION) at 12:07

## 2025-07-08 RX ADMIN — CEFEPIME 2 G: 2 INJECTION, POWDER, FOR SOLUTION INTRAVENOUS at 01:07

## 2025-07-08 RX ADMIN — DILTIAZEM HYDROCHLORIDE 120 MG: 120 CAPSULE, COATED, EXTENDED RELEASE ORAL at 09:07

## 2025-07-08 RX ADMIN — IPRATROPIUM BROMIDE AND ALBUTEROL SULFATE 3 ML: .5; 3 SOLUTION RESPIRATORY (INHALATION) at 07:07

## 2025-07-08 RX ADMIN — CYCLOBENZAPRINE 10 MG: 10 TABLET, FILM COATED ORAL at 08:07

## 2025-07-08 RX ADMIN — MUPIROCIN: 20 OINTMENT TOPICAL at 08:07

## 2025-07-08 RX ADMIN — ASPIRIN 81 MG: 81 TABLET, COATED ORAL at 09:07

## 2025-07-08 RX ADMIN — OXCARBAZEPINE 600 MG: 150 TABLET, FILM COATED ORAL at 08:07

## 2025-07-08 NOTE — PROGRESS NOTES
On-call physician note: I was contacted by Dr Davis at 510 pm . Anuj Caldwell presented with wheezing , shortness of breath , tachypnea and dark urine . Mother brought him because she was concerned about a possible UTI . Significant lab abnormalities include mild hypoxia on ABG, markedly abnormal UA. BC and urine culture are pending . Patient has a history of prior catheter associated UTIs and is at risk for aspiration. CT and CXR significant for bibasilar infiltrates but resp panel was negative. Recommend cover for atypical infection and UTI . Given history of pseudomonas uti, will cover appropriately while awaiting results . Monitor respiratory status closely.

## 2025-07-08 NOTE — PROGRESS NOTES
Inpatient Nutrition Assessment    Admit Date: 7/7/2025   Total duration of encounter: 1 day   Patient Age: 39 y.o.    Nutrition Recommendation/Prescription     Diet consistency per ST; was tolerating soft/bite size prior. ; no oatmeal --pt preference  When diet progressed --will order ONS--vanilla boost tid  MVI/fe  Biweekly wt  Will monitor nutrition status/diet progression/tolerance     Communication of Recommendations: reviewed with nurse and reviewed with patient    Nutrition Assessment     Malnutrition Assessment/Nutrition-Focused Physical Exam       Malnutrition Level: other (see comments) (Does not meet criteria) (07/08/25 1132)  Energy Intake (Malnutrition): other (see comments) (Does not meet criteria) (07/08/25 1132)  Weight Loss (Malnutrition): other (see comments) (Does not meet criteria) (07/08/25 1132)         Clavicle Bone Region (Muscle Loss): well nourished            Fluid Accumulation (Malnutrition): other (see comments) (Not present) (07/08/25 1132)     Hand  Strength, Right (Malnutrition): Unable to assess (07/08/25 1132)  A minimum of two characteristics is recommended for diagnosis of either severe or non-severe malnutrition.    Chart Review    Reason Seen: malnutrition screening tool (MST)    Malnutrition Screening Tool Results   Have you recently lost weight without trying?: Unsure  Have you been eating poorly because of a decreased appetite?: No   MST Score: 2   Diagnosis:  UTI, cough, Friedreichatoxia, HTN     Relevant Medical History: Friedreichataxia, neurogenic bladder, HTN     Scheduled Medications:  albuterol-ipratropium, 3 mL, Q6H WAKE  aspirin, 81 mg, Daily  azithromycin, 500 mg, Daily  ceFEPime IV (PEDS and ADULTS), 2 g, Q8H  cetirizine, 10 mg, Daily  cyclobenzaprine, 10 mg, QHS  diltiaZEM, 120 mg, Daily  docusate sodium, 100 mg, BID  enoxparin, 40 mg, Daily  fluticasone propionate, 2 spray, BID  folic acid, 1 mg, Daily  losartan, 50 mg, Daily  metoprolol succinate, 100 mg,  "Daily  metroNIDAZOLE IV (PEDS and ADULTS), 500 mg, Q8H  mupirocin, , BID  pantoprazole, 40 mg, Daily    Continuous Infusions:   PRN Medications:  dextrose 50%, 12.5 g, PRN  dextrose 50%, 25 g, PRN  glucagon (human recombinant), 1 mg, PRN  glucose, 16 g, PRN  glucose, 24 g, PRN  hydrOXYzine pamoate, 25 mg, BID PRN  naloxone, 0.02 mg, PRN  OXcarbazepine, 600 mg, BID PRN  sodium chloride 0.9%, 10 mL, Q12H PRN    Calorie Containing IV Medications: no significant kcals from medications at this time    Recent Labs   Lab 25  1427 25  0330   * 129*   K 3.7 4.4   CALCIUM 9.5 9.1   CL 96* 98   CO2 26 20*   BUN 9.8 8.9   CREATININE 0.92 0.77   EGFRNORACEVR >60 >60   * 124*   BILITOT 0.4 0.5   ALKPHOS 139 123   ALT 24 21   AST 20 19   ALBUMIN 3.9 3.6   WBC 8.59 8.93   HGB 16.4 15.8   HCT 47.8 45.7     Nutrition Orders:  Diet NPO      Appetite/Oral Intake: NPO/NPO  Factors Affecting Nutritional Intake: NPO and shortness of breath  Social Needs Impacting Access to Food: none identified  Food/Voodoo/Cultural Preferences: none reported  Food Allergies: none reported  Last Bowel Movement: 25  Wound(s):  none reported     Comments    () Pt NPO until ST complete swallow eval; RN reported pt was tolerating soft/bite size diet prior; ate approx 25% breakfast meal; no N/V; admitted with SOB. Pt stated does not like oatmeal; willing to try ONS when diet progressed. Per EMR --average wt approx 189-196#.     Anthropometrics     ,Ht 5'2" (estimate)     Last Weight: 86 kg (189 lb 11.2 oz) (25 1343), Weight Method: Bed Scale  BMI (Calculated): 34.7  BMI Classification: obese grade I (BMI 30-34.9)      #         Usual Body Weight (UBW), k kg (-196#)  % Usual Body Weight: 100.26     Usual Weight Provided By: EMR weight history    Wt Readings from Last 5 Encounters:   25 86 kg (189 lb 11.2 oz)   25 87.1 kg (192 lb)   24 88.9 kg (196 lb)   24 88.9 kg (196 lb) "   06/21/24 76.7 kg (169 lb)     Weight Change(s) Since Admission: no significant wt change   Wt Readings from Last 1 Encounters:   07/07/25 1343 86 kg (189 lb 11.2 oz)   Admit Weight: 86 kg (189 lb 11.2 oz) (07/07/25 1343), Weight Method: Bed Scale    Estimated Needs    Weight Used For Calorie Calculations: 86 kg (189 lb 9.5 oz)  Energy Calorie Requirements (kcal): 2150 kcal/d; 25 kcal/kg  Energy Need Method: Kcal/kg  Weight Used For Protein Calculations: 86 kg (189 lb 9.5 oz)  Protein Requirements: 103 gm protein/d; 1.2 gm/kg  Fluid Requirements (mL): 2150 ml/d; 1ml/darryl        Enteral Nutrition     Patient not receiving enteral nutrition at this time.    Parenteral Nutrition     Patient not receiving parenteral nutrition support at this time.    Evaluation of Received Nutrient Intake    Calories: not meeting estimated needs; currently NPO (7/8)   Protein: not meeting estimated needs    Patient Education     Not applicable.    Nutrition Diagnosis     PES: Inadequate oral intake related to acute illness as evidenced by NPO status . (new)     PES:            Nutrition Interventions     Intervention(s): general/healthful diet, commercial beverage, multivitamin/mineral supplement therapy, and collaboration with other providers  Intervention(s):      Goal: Meet greater than 80% of nutritional needs by follow-up. (new)  Goal: Maintain weight throughout hospitalization. (new)    Nutrition Goals & Monitoring     Dietitian will monitor: food and beverage intake and weight  Discharge planning: regular diet with texture modifications per SLP  Nutrition Risk/Follow-Up: dietitian will follow-up one time per week   Please consult if re-assessment needed sooner.

## 2025-07-08 NOTE — PT/OT/SLP EVAL
[facility]  MODIFIED BARIUM SWALLOW STUDY  Initial          Date: 07/08/2025     Name: Anuj Caldwell   MRN: [id number]    Therapy Diagnosis: oropharyngeal dysphagia    Physician: Patricia Delacruz DO   Physician Orders: SLP Video Swallow  Medical Diagnosis from Referral:     Speech Start Time:  1315  Speech Stop Time:  1345    Speech Total Time (min):  30m    Procedure Min.   Fl Modified Barium Swallow Speech  30m     Precautions: Standard and Aspiration    Recommendations:   Consistency Recommendations: Full Liquids. ALL LIQUIDS VIA CONTROL SIP CUP (PROVALE). Advance to IDDSI 5/0 once cleared by GI.   Precautions: supervision recommended, feed only when alert, sit as upright as possible, upright 30 minutes after meals, frequent oral care, and slow rate  Risk Management: use good oral hygiene , sit upright for all PO intake, and increase physical mobility as tolerated  Specialist Referrals: GI  Ancillary Tests:   Therapy: Dysphagia therapy is recommended including TBD.  Frequency: 2 days a week  Follow-up exam: as indicated  Discharge Disposition: low intensity    Subjective     Past Medical History: Anuj Caldwell  has a past surgical history that includes [place] tooth extraction.         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    S/P ablation of atrial fibrillation 11/20/2015    Neurogenic bladder 08/25/2022    Kidney stones 03/02/2023    History of fibromyalgia 06/06/2023    Epilepsy 06/06/2023    Gastroesophageal reflux disease 06/06/2023    Primary hypertension 06/06/2023    Schizophrenia 06/06/2023    Spasm 06/06/2023    Symptomatic cholelithiasis 11/26/2019    Major depressive disorder with single episode 06/06/2023    Penile erosion 03/11/2024     Resolved Ambulatory Problems     Diagnosis Date Noted    S/P ablation of atrial flutter  "11/20/2015    Depressive disorder 06/06/2023    Recurrent UTI 02/02/2024     Past Medical History:   Diagnosis Date    Seizures 2007      Medical Hx and Allergies:    Review of patient's allergies indicates:   Allergen Reactions    Amoxicillin Other (See Comments)     Pt states does not know reaction (was a baby at that time)  Mother reports high fever       History & Physical  Per medical record: "Anuj Caldwell is a 39 y.o.  male with a history of Friedreich's ataxia, neurogenic bladder, and hypertension who presented on 7/7/2025  with c/o  dark urine and cough onset 1 week ago.  Mother states the patient's urine has been dark, with visible sediment.  Patient has a suprapubic catheter in place . He has been coughing significantly more.  Cough has produced a pale yellow sputum. .  There was an occasion about week ago where the patient did spit up some meat, which least 2 mother feeling some concern for aspiration.  He has not had any fevers at home, he denies any abdominal pain.  There has been no nausea, vomiting, or diarrhea.  Of note, patient had urine cultures positive for Pseudomonas and Klebsiella in March of 2024.     In the ED, initial pulse was 79, blood pressure 136/92, temperature 98.4° F, satting 94% on room air.  Oxygen began to desaturate to the low 90s he was placed on 2 L nasal cannula.  Patient was also treated with DuoNebs.  Saturations began to be in the mid to high 90s on 2 L. white count was unremarkable at 8.59.  H&H was normal.  Sodium 131, potassium 3.7, all other electrolytes within normal limits.  Lactic acid was found to be normal, as was the BNP.  His urine was turbid, had 2+ protein, 3+ glucose, 500 leuks esterase, and greater than 100 RBCs and white blood cells.  Many bacteria were also noted.  The ED did draw 2 sets of blood cultures and urine culture.  Says x-ray and chest CT were done and showed evidence of potential atypical infection bilaterally.  Family medicine inpatient team was " "consulted for admission." Speech pathology consulted to assess swallow objectively via MBSS.              Imaging:  CT Chest: 7/7/25  Impression:     Bilateral lower lobe pneumonia appears acute    CXR: 7/7/25  Impression:     Findings suggestive of pulmonary edema or atypical infection          PREVIOUS SPEECH THERAPY:    MBSS: 11/5/24  Recommendations:   Consistency Recommendations: Thin (via Provale cup (controlled sip cup) liquids (IDDSI Soft/bite size) and   consistencies (IDDSI 6).  Medications should be taken whole in puree and crushed (when possible) in puree.   Precautions:supervision recommended, feed only when alert, sit as upright as possible, upright 30 minutes after meals, alternate food and liquid, decrease bite/drink size, frequent oral care, and slower rate during PO intake.  Risk Management: use good oral hygiene , sit upright for all PO intake, and increase physical mobility as tolerated  Specialist Referrals:   Ancillary Tests:   Therapy: Dysphagia therapy is not recommended at this time.  Frequency:   Follow-up exam: Follow up swallow study is not indicated at this time.    Impression   Oral phase remarkable for  adequate bolus acceptance from utensils. Reduced oral control posteriorly appreciated which resulted in loss of bolus to the pharyngeal space. Reduced mastication timing and  range of motion resulted in unchewed bolus being swallowed.  No pocketing. Pharyngeal phase remarkable for reduced epiglottic closure resulting in penetration of thin liquids. Reduced bolus size and rate increased swallow safety with liquids. No aspiration appreciated with any consistencies during this study. Based on this assessment, patient appears safe to consume a modified PO diet with aspiration precautions/compensatory strategies. One compensatory strategy recommended was a controlled sip cup (Provale) for administration of thin liquids. Verbal understanding from parent given. Cognitive deficits can increase " risk for aspiration, 1:1 assist with PO intake recommended.  Written and verbal instructions provided. No further skilled ST services indicated at this time.          Modified barium swallow study (MBSS) completed to assess swallow effectiveness, ID/rule out penetration and aspiration, make appropriate recommendations regarding safest diet consistency, effective compensatory strategies and safe eating environment.           The following observations were made:   -Mental status: Alert and Cooperative  -Factors affecting performance: impairment or difficulty noted in mental status  -Feeding Method: dependent for feeding can hold cup with max assist    Respiratory Status:   -Respiratory Status: nasal cannula      Pain Scale:  0/10 on VAS currently.   Pain Location: no pain presented    Objective   Cranial Nerve Examination  Unable to complete.    Other information:  Volitional Swallow: Able to palpate laryngeal rise  Mucosal Quality: No abnormal findings  Secretion Management: Secretion Mgmt: adequate  Dentition: Good condition for speech and mastication     CONSISTENCIES ADMINISTERED:  Thin Liquids (IDDSI 0):  Mode: SLP fed  Oral Residue: trace    Vallecular Residue: trace    Pyriform Sinus Residue: trace    Rosenbeck's 8-Point Penetration-Aspiration Scale:  Best: (1) Material does not enter the airway  penetration or aspiration did not occur during the swallow with thin liquids via straw  Worst: (2) Material enters the airway, remains above the vocal folds, and is ejected from the airway  penetration did occur during the swallow with thin liquids via straw  Strategies attempted: control sip  Barium Tablet: The barium tablet was administered in thin liquids due to  .    Mildly Thick Liquids (IDDSI 2):  Mode: SLP fed  Oral Residue: trace   Vallecular Residue: trace   Pyriform Sinus Residue: trace   Rosenbeck's 8-Point Penetration-Aspiration Scale:   Best: (1) Material does not enter the airway  penetration or  "aspiration did not occur during the swallow with mildly thick liquids via     Strategies attempted:   Barium Tablet:     Puree (IDDSI 4):  Mode:  SLP fed  Oral Residue: none   Vallecular Residue: none   Pyriform Sinus Residue: none   Rosenbeck's 8-Point Penetration-Aspiration Scale:   Best: (1) Material does not enter the airway  penetration or aspiration did not occur during the swallow.    Strategies attempted:   Barium Tablet:     Mixed (Soft and Bite Sized) Consistency Bolus (IDDSI 6 with IDDSI 2):  Mode: SLP fed  Oral Residue: trace   Vallecular Residue: none   Pyriform Sinus Residue: none   Rosenbeck's 8-Point Penetration-Aspiration Scale:   Best: (1) Material does not enter the airway  penetration or aspiration did not occur during the swallow.      Regular (IDDSI 7):  DNT    Impression   Oral phase remarkable for adequate bolus acceptance from utensils however patient does require control with bolus size. Mastication process with solids is incomplete resulting in whole pieces being swallowed. No pocketing appreciated. Pharyngeal phase remarkable for one instance of deep penetration with thin liquids which did clear the airway. No other instances of airway invasion appreciated for the remainder of this study.    Based on this assessment and in comparison with the MBSS on 11/5/24, patient's ability to "chew" solids has declined. Recommend to modified solid consistencies for safer consumption. Patient may benefit from a GI consult as an esophageal component may impact patient's overall swallow and tolerance of a PO diet.                     PILLARS OF PNA: According to Dr. Dwain Wahl (2005), there are 3 pillars that contribute to aspiration related pneumonia. The Three Pillars of Aspiration Pneumonia is research showing that aspiration pneumonia only develops when three factors are present: aspiration, compromised immune system, poor oral hygiene. Pt presents with the following Pillars of Pneumonia (Vish, " 2008):    Risk factors Present (yes/no) Comments:   Impaired health status  yes  deconditioning   Poor oral health   no     High prevalence of dysphagia risk factors  yes       Assessment     Preparatory Phase:     Patient Positioning:   Upright in radiology chair      Level of Assistance:   Total assistance      Regulation of Bolus:   SLP fed    Oral Phase:     Labial Seal:   interlabial escape      Lingual Movement:    Lingual motion was delayed.    Bolus Acceptance:   WFL     Oral Manipulation:   WFL     Mastication:   disorganized with solid pieces of bolus unchewed    Oral Transit:   WFL     Oral Pocketing:   No    Oral Residue:   There was no significant oral residue.    Pharyngeal Phase:     Tongue Base Retraction:     Coating of contrast between base of tongue and posterior pharyngeal wall with some consistencies       Initiation of Pharyngeal Swallow:   Occurs when the bolus head is at the ramus of the mandible with some consistencies    Occurs when the bolus head reaches the valleculae with some consistencies       Epiglottic Closure:  Partial inversion      Laryngeal Elevation:   Partial elevation      Anterior Hyoid Excursion:   Partial excursion      Pharyngeal Contractions (A-P view only):   Adequate     Pharyngeal Stripping Wave:   Present       Vallecular Residue:    Coating observed with some consistencies      Posterior Pharyngeal Wall Residue:   None observed with consistencies presented        Pyriform Sinus Residue:   None observed with consistencies presented       Esophageal Phase:   On esophageal screen, UES appeared to accommodate all bolus types without stasis or retrograde movement observed        Goals:     LONG TERM GOAL    Anuj Page will tolerate full liquids without overt s/sx of aspiration to maintain appropriate nutrition and hydration.  Initial     SHORT TERM GOAL    Anuj Page  will participate in dysphagia program to restore and maintain current labial, lingual and laryngeal  strength and ROM.  Initial   Anuj Page will consume ice chips to maintain/improve adequate swallow  function.   Initial       The Functional Oral Intake Scale (FOIS) is an ordinal scale that is used to assess the current status and meaningful change in the oral intake. FOIS levels include:        TUBE DEPENDENT   (Levels 1-3) 1. No oral intake    2. Tube dependent with minimal/inconsistent oral intake    3. Tube supplements with consistent oral intake          TOTAL ORAL INTAKE (Levels 4-7) 4. Total oral intake of a single consistency    5. Total oral intake of multiple consistencies requiring special preparation    6. Total oral intake with no special preparation, but must avoid specific foods or liquid items    7. Total oral intake with no restrictions   Patient is currently judged to be at FOIS Level 5            *AMANDA Wahl. (2005, March). Pneumonia: Factors Beyond Aspiration. Perspectives in Swallowing and Swallowing Disorders (Dysphagia), 14, 10-16.  Education   Education: Results of the study, Aspiration precautions, and Recommendations were discussed with the patient and RN (Agustina). Patient expressed understanding.     Barriers to Learning: co-morbidities    Teaching Method: Verbal      *If this is the last documented treatment note, then it will signify discharge from acute care prior to discharge from speech services and will serve as the discharge summary.*    Rolando Turner CCC-SLP   Speech-Language Pathologist    Date: 7/7/2025

## 2025-07-08 NOTE — PLAN OF CARE
Problem: Adult Inpatient Plan of Care  Goal: Plan of Care Review  Outcome: Progressing  Goal: Patient-Specific Goal (Individualized)  Outcome: Progressing  Goal: Absence of Hospital-Acquired Illness or Injury  Outcome: Progressing  Goal: Optimal Comfort and Wellbeing  Outcome: Progressing  Goal: Readiness for Transition of Care  Outcome: Progressing     Problem: Skin Injury Risk Increased  Goal: Skin Health and Integrity  Outcome: Progressing     Problem: Fall Injury Risk  Goal: Absence of Fall and Fall-Related Injury  Outcome: Progressing     Problem: Infection  Goal: Absence of Infection Signs and Symptoms  Outcome: Progressing     Problem: Pain Acute  Goal: Optimal Pain Control and Function  Outcome: Progressing

## 2025-07-08 NOTE — PLAN OF CARE
07/08/25 1128   Discharge Assessment   Assessment Type Discharge Planning Assessment   Confirmed/corrected address, phone number and insurance Yes   Confirmed Demographics Correct on Facesheet   Source of Information family   People in Home parent(s)   Name(s) of People in Home Gladys Weeks ([phone]); Radha Mayer (Sister/POA) [phone]   Facility Arrived From: Urgent Care   Do you expect to return to your current living situation? Yes   Do you have help at home or someone to help you manage your care at home? Yes   Who are your caregiver(s) and their phone number(s)? Gladys Weeks ([phone]); [facility]   Walking or Climbing Stairs Difficulty yes   Walking or Climbing Stairs ambulation difficulty, requires equipment;transferring difficulty, dependent;ambulation difficulty, dependent   Mobility Management W/C;    Dressing/Bathing Difficulty yes   Dressing/Bathing bathing difficulty, requires equipment;bathing difficulty, dependent   Dressing/Bathing Management Bath Chair;    Home Accessibility wheelchair accessible   Home Layout Able to live on 1st floor   Equipment Currently Used at Home hospital bed;wheelchair;lift device;shower chair  (Handicap Van)   Readmission within 30 days? No   Patient currently being followed by outpatient case management? No   Do you currently have service(s) that help you manage your care at home? Yes   How Many hours does patient receive services 56   Name and Contact number of agency [facility]   Is the pt/caregiver preference to resume services with current agency Yes   Do you take prescription medications? Yes   Do you have prescription coverage? Yes   Do you have any problems affording any of your prescribed medications? No   Is the patient taking medications as prescribed? yes   Who is going to help you get home at discharge? Mother   How do you get to doctors appointments? family or friend will provide   Are you on  dialysis? No   Discharge Plan A Home with family;Home Health   DME Needed Upon Discharge  none   Discharge Plan discussed with: Parent(s)   Name(s) and Number(s) Mother, Gladys Jeronimo ([phone])   Transition of Care Barriers None     Pt has required total care assist for the last 3 yrs; Resides with parents & disabled sister; Receives 56 hrs/week [facility] through [facility]; Mother, Gladys Jeronimo, transports pt in Handicap Accessible Van; Sister/POA, Radha Mayer ([phone]) moving back to [place] from [place] next month; CM to follow.

## 2025-07-09 ENCOUNTER — ANESTHESIA (OUTPATIENT)
Dept: ENDOSCOPY | Facility: HOSPITAL | Age: 40
DRG: 689 | End: 2025-07-09
Payer: MEDICAID

## 2025-07-09 ENCOUNTER — ANESTHESIA EVENT (OUTPATIENT)
Dept: ENDOSCOPY | Facility: HOSPITAL | Age: 40
DRG: 689 | End: 2025-07-09
Payer: MEDICAID

## 2025-07-09 PROBLEM — R91.8 BILATERAL PULMONARY INFILTRATES: Status: ACTIVE | Noted: 2025-07-09

## 2025-07-09 PROBLEM — J18.9 PNEUMONIA OF BOTH LOWER LOBES DUE TO INFECTIOUS ORGANISM: Status: ACTIVE | Noted: 2025-07-09

## 2025-07-09 LAB
A-ADO2 BLOOD GAS (OHS): 35 MMHG
ALBUMIN SERPL-MCNC: 3.6 G/DL (ref 3.5–5)
ALBUMIN/GLOB SERPL: 0.9 RATIO (ref 1.1–2)
ALP SERPL-CCNC: 110 UNIT/L (ref 40–150)
ALT SERPL-CCNC: 25 UNIT/L (ref 0–55)
ANION GAP SERPL CALC-SCNC: 13 MEQ/L
AST SERPL-CCNC: 20 UNIT/L (ref 11–45)
BASE EXCESS BLD CALC-SCNC: 3.4 MMOL/L (ref -2–2)
BASOPHILS # BLD AUTO: 0.01 X10(3)/MCL
BASOPHILS NFR BLD AUTO: 0.1 %
BILIRUB SERPL-MCNC: 0.5 MG/DL
BLOOD GAS SAMPLE TYPE (OHS): ABNORMAL
BUN SERPL-MCNC: 11.9 MG/DL (ref 8.9–20.6)
CALCIUM SERPL-MCNC: 9 MG/DL (ref 8.4–10.2)
CHLORIDE SERPL-SCNC: 98 MMOL/L (ref 98–107)
CO2 BLDA-SCNC: 30.5 MMOL/L (ref 22–26)
CO2 SERPL-SCNC: 24 MMOL/L (ref 22–29)
COHGB MFR BLDA: 2.2 % (ref 0.5–1.5)
CREAT SERPL-MCNC: 0.9 MG/DL (ref 0.72–1.25)
CREAT/UREA NIT SERPL: 13
EOSINOPHIL # BLD AUTO: 0 X10(3)/MCL (ref 0–0.9)
EOSINOPHIL NFR BLD AUTO: 0 %
ERYTHROCYTE [DISTWIDTH] IN BLOOD BY AUTOMATED COUNT: 13.2 % (ref 11.5–17)
GAS PNL BLD: 91 MMHG
GFR SERPLBLD CREATININE-BSD FMLA CKD-EPI: >60 ML/MIN/1.73/M2
GLOBULIN SER-MCNC: 4 GM/DL (ref 2.4–3.5)
GLUCOSE SERPL-MCNC: 110 MG/DL (ref 74–100)
HCO3 BLDA-SCNC: 29.1 MMOL/L (ref 22–26)
HCT VFR BLD AUTO: 45.8 % (ref 42–52)
HGB BLD-MCNC: 15.7 G/DL (ref 14–18)
IMM GRANULOCYTES # BLD AUTO: 0.04 X10(3)/MCL (ref 0–0.04)
IMM GRANULOCYTES NFR BLD AUTO: 0.4 %
INHALED O2 CONCENTRATION: 21 %
LYMPHOCYTES # BLD AUTO: 1.63 X10(3)/MCL (ref 0.6–4.6)
LYMPHOCYTES NFR BLD AUTO: 15.9 %
MCH RBC QN AUTO: 28.4 PG (ref 27–31)
MCHC RBC AUTO-ENTMCNC: 34.3 G/DL (ref 33–36)
MCV RBC AUTO: 83 FL (ref 80–94)
METHGB MFR BLDA: 1 % (ref 0–1.5)
MONOCYTES # BLD AUTO: 0.92 X10(3)/MCL (ref 0.1–1.3)
MONOCYTES NFR BLD AUTO: 9 %
NEUTROPHILS # BLD AUTO: 7.66 X10(3)/MCL (ref 2.1–9.2)
NEUTROPHILS NFR BLD AUTO: 74.6 %
NRBC BLD AUTO-RTO: 0 %
O2 HB BLOOD GAS (OHS): 88.1 % (ref 94–100)
OXYHGB MFR BLDA: 15 G/DL (ref 12–18)
PCO2 BLDA: 47 MMHG (ref 35–45)
PH BLDA: 7.4 [PH] (ref 7.35–7.45)
PLATELET # BLD AUTO: 351 X10(3)/MCL (ref 130–400)
PMV BLD AUTO: 8.9 FL (ref 7.4–10.4)
PO2 BLDA: 56 MMHG (ref 75–100)
POTASSIUM SERPL-SCNC: 3.9 MMOL/L (ref 3.5–5.1)
PROT SERPL-MCNC: 7.6 GM/DL (ref 6.4–8.3)
RBC # BLD AUTO: 5.52 X10(6)/MCL (ref 4.7–6.1)
SAO2 % BLDA: 90.9 %
SODIUM SERPL-SCNC: 135 MMOL/L (ref 136–145)
WBC # BLD AUTO: 10.26 X10(3)/MCL (ref 4.5–11.5)

## 2025-07-09 PROCEDURE — 82803 BLOOD GASES ANY COMBINATION: CPT

## 2025-07-09 PROCEDURE — 37000008 HC ANESTHESIA 1ST 15 MINUTES: Performed by: INTERNAL MEDICINE

## 2025-07-09 PROCEDURE — 36415 COLL VENOUS BLD VENIPUNCTURE: CPT

## 2025-07-09 PROCEDURE — 25000003 PHARM REV CODE 250: Performed by: SPECIALIST

## 2025-07-09 PROCEDURE — 94640 AIRWAY INHALATION TREATMENT: CPT

## 2025-07-09 PROCEDURE — 43235 EGD DIAGNOSTIC BRUSH WASH: CPT | Performed by: INTERNAL MEDICINE

## 2025-07-09 PROCEDURE — 63600175 PHARM REV CODE 636 W HCPCS

## 2025-07-09 PROCEDURE — 25000003 PHARM REV CODE 250

## 2025-07-09 PROCEDURE — D9220A PRA ANESTHESIA: Mod: ,,, | Performed by: NURSE ANESTHETIST, CERTIFIED REGISTERED

## 2025-07-09 PROCEDURE — 25000003 PHARM REV CODE 250: Performed by: FAMILY MEDICINE

## 2025-07-09 PROCEDURE — 27000221 HC OXYGEN, UP TO 24 HOURS

## 2025-07-09 PROCEDURE — 25000242 PHARM REV CODE 250 ALT 637 W/ HCPCS

## 2025-07-09 PROCEDURE — 0DJ08ZZ INSPECTION OF UPPER INTESTINAL TRACT, VIA NATURAL OR ARTIFICIAL OPENING ENDOSCOPIC: ICD-10-PCS | Performed by: INTERNAL MEDICINE

## 2025-07-09 PROCEDURE — 85025 COMPLETE CBC W/AUTO DIFF WBC: CPT

## 2025-07-09 PROCEDURE — 25000003 PHARM REV CODE 250: Performed by: NURSE ANESTHETIST, CERTIFIED REGISTERED

## 2025-07-09 PROCEDURE — 80053 COMPREHEN METABOLIC PANEL: CPT

## 2025-07-09 PROCEDURE — 94664 DEMO&/EVAL PT USE INHALER: CPT

## 2025-07-09 PROCEDURE — 63600175 PHARM REV CODE 636 W HCPCS: Performed by: NURSE ANESTHETIST, CERTIFIED REGISTERED

## 2025-07-09 PROCEDURE — 21400001 HC TELEMETRY ROOM

## 2025-07-09 PROCEDURE — 94761 N-INVAS EAR/PLS OXIMETRY MLT: CPT | Mod: XB

## 2025-07-09 PROCEDURE — 99900035 HC TECH TIME PER 15 MIN (STAT)

## 2025-07-09 PROCEDURE — 36600 WITHDRAWAL OF ARTERIAL BLOOD: CPT

## 2025-07-09 PROCEDURE — 27000173 HC ACAPELLA DEVICE DH OR DM

## 2025-07-09 RX ORDER — SODIUM CHLORIDE, SODIUM LACTATE, POTASSIUM CHLORIDE, CALCIUM CHLORIDE 600; 310; 30; 20 MG/100ML; MG/100ML; MG/100ML; MG/100ML
INJECTION, SOLUTION INTRAVENOUS CONTINUOUS PRN
Status: DISCONTINUED | OUTPATIENT
Start: 2025-07-09 | End: 2025-07-09

## 2025-07-09 RX ORDER — DEXMEDETOMIDINE HYDROCHLORIDE 100 UG/ML
INJECTION, SOLUTION INTRAVENOUS
Status: DISCONTINUED | OUTPATIENT
Start: 2025-07-09 | End: 2025-07-09

## 2025-07-09 RX ORDER — IPRATROPIUM BROMIDE AND ALBUTEROL SULFATE 2.5; .5 MG/3ML; MG/3ML
3 SOLUTION RESPIRATORY (INHALATION)
Status: DISCONTINUED | OUTPATIENT
Start: 2025-07-09 | End: 2025-07-15 | Stop reason: HOSPADM

## 2025-07-09 RX ORDER — SODIUM CHLORIDE 9 MG/ML
INJECTION, SOLUTION INTRAVENOUS CONTINUOUS
Status: DISCONTINUED | OUTPATIENT
Start: 2025-07-09 | End: 2025-07-11

## 2025-07-09 RX ORDER — LIDOCAINE HYDROCHLORIDE 20 MG/ML
INJECTION INTRAVENOUS
Status: DISCONTINUED | OUTPATIENT
Start: 2025-07-09 | End: 2025-07-09

## 2025-07-09 RX ORDER — SODIUM CHLORIDE 9 MG/ML
0-999 INJECTION, SOLUTION INTRAVENOUS CONTINUOUS
Status: DISCONTINUED | OUTPATIENT
Start: 2025-07-09 | End: 2025-07-11

## 2025-07-09 RX ORDER — PROPOFOL 10 MG/ML
INJECTION, EMULSION INTRAVENOUS
Status: DISCONTINUED | OUTPATIENT
Start: 2025-07-09 | End: 2025-07-09

## 2025-07-09 RX ADMIN — CETIRIZINE HYDROCHLORIDE 10 MG: 10 TABLET, FILM COATED ORAL at 06:07

## 2025-07-09 RX ADMIN — LIDOCAINE HYDROCHLORIDE 100 MG: 20 INJECTION INTRAVENOUS at 03:07

## 2025-07-09 RX ADMIN — SODIUM CHLORIDE 999 ML/HR: 9 INJECTION, SOLUTION INTRAVENOUS at 03:07

## 2025-07-09 RX ADMIN — FLUTICASONE PROPIONATE 100 MCG: 50 SPRAY, METERED NASAL at 09:07

## 2025-07-09 RX ADMIN — AZITHROMYCIN MONOHYDRATE 500 MG: 500 INJECTION, POWDER, LYOPHILIZED, FOR SOLUTION INTRAVENOUS at 08:07

## 2025-07-09 RX ADMIN — CEFEPIME 2 G: 2 INJECTION, POWDER, FOR SOLUTION INTRAVENOUS at 12:07

## 2025-07-09 RX ADMIN — ENOXAPARIN SODIUM 40 MG: 40 INJECTION SUBCUTANEOUS at 06:07

## 2025-07-09 RX ADMIN — LOSARTAN POTASSIUM 50 MG: 25 TABLET, FILM COATED ORAL at 06:07

## 2025-07-09 RX ADMIN — DILTIAZEM HYDROCHLORIDE 120 MG: 120 CAPSULE, COATED, EXTENDED RELEASE ORAL at 06:07

## 2025-07-09 RX ADMIN — DEXMEDETOMIDINE 20 MCG: 200 INJECTION, SOLUTION INTRAVENOUS at 03:07

## 2025-07-09 RX ADMIN — SODIUM CHLORIDE: 9 INJECTION, SOLUTION INTRAVENOUS at 02:07

## 2025-07-09 RX ADMIN — METRONIDAZOLE 500 MG: 5 INJECTION, SOLUTION INTRAVENOUS at 03:07

## 2025-07-09 RX ADMIN — CYCLOBENZAPRINE 10 MG: 10 TABLET, FILM COATED ORAL at 08:07

## 2025-07-09 RX ADMIN — CEFEPIME 2 G: 2 INJECTION, POWDER, FOR SOLUTION INTRAVENOUS at 10:07

## 2025-07-09 RX ADMIN — HYDROXYZINE PAMOATE 25 MG: 25 CAPSULE ORAL at 08:07

## 2025-07-09 RX ADMIN — METRONIDAZOLE 500 MG: 5 INJECTION, SOLUTION INTRAVENOUS at 06:07

## 2025-07-09 RX ADMIN — PANTOPRAZOLE SODIUM 40 MG: 40 TABLET, DELAYED RELEASE ORAL at 06:07

## 2025-07-09 RX ADMIN — IPRATROPIUM BROMIDE AND ALBUTEROL SULFATE 3 ML: .5; 3 SOLUTION RESPIRATORY (INHALATION) at 12:07

## 2025-07-09 RX ADMIN — ASPIRIN 81 MG: 81 TABLET, COATED ORAL at 06:07

## 2025-07-09 RX ADMIN — SODIUM CHLORIDE, POTASSIUM CHLORIDE, SODIUM LACTATE AND CALCIUM CHLORIDE: 600; 310; 30; 20 INJECTION, SOLUTION INTRAVENOUS at 03:07

## 2025-07-09 RX ADMIN — MUPIROCIN: 20 OINTMENT TOPICAL at 06:07

## 2025-07-09 RX ADMIN — OXCARBAZEPINE 600 MG: 150 TABLET, FILM COATED ORAL at 08:07

## 2025-07-09 RX ADMIN — MUPIROCIN: 20 OINTMENT TOPICAL at 08:07

## 2025-07-09 RX ADMIN — IPRATROPIUM BROMIDE AND ALBUTEROL SULFATE 3 ML: .5; 3 SOLUTION RESPIRATORY (INHALATION) at 07:07

## 2025-07-09 RX ADMIN — METOPROLOL SUCCINATE 100 MG: 50 TABLET, EXTENDED RELEASE ORAL at 06:07

## 2025-07-09 RX ADMIN — DOCUSATE SODIUM 100 MG: 100 CAPSULE, LIQUID FILLED ORAL at 08:07

## 2025-07-09 RX ADMIN — MIRTAZAPINE 15 MG: 7.5 TABLET, FILM COATED ORAL at 08:07

## 2025-07-09 RX ADMIN — PROPOFOL 40 MG: 10 INJECTION, EMULSION INTRAVENOUS at 03:07

## 2025-07-09 RX ADMIN — PROPOFOL 50 MG: 10 INJECTION, EMULSION INTRAVENOUS at 03:07

## 2025-07-09 RX ADMIN — GUAIFENESIN 200 MG: 100 LIQUID ORAL at 02:07

## 2025-07-09 RX ADMIN — FOLIC ACID 1 MG: 1 TABLET ORAL at 06:07

## 2025-07-09 NOTE — PROGRESS NOTES
[facility]  SPEECH LANGUAGE PATHOLOGY  MISSED VISIT NOTE      PATIENT:  Anuj Caldwell    : 1985    MRN: [id number]    DATE: 2025          HISTORY & PHYSICAL:    Active Ambulatory Problems     Diagnosis Date Noted    Atrial fibrillation 2015    Atrial flutter 2015    Primary cardiomyopathy 2015    Friedreich's ataxia 2015    S/P ablation of atrial fibrillation 2015    Neurogenic bladder 2022    Kidney stones 2023    History of fibromyalgia 2023    Epilepsy 2023    Gastroesophageal reflux disease 2023    Primary hypertension 2023    Schizophrenia 2023    Spasm 2023    Symptomatic cholelithiasis 2019    Major depressive disorder with single episode 2023    Penile erosion 2024     Resolved Ambulatory Problems     Diagnosis Date Noted    S/P ablation of atrial flutter 2015    Depressive disorder 2023    Recurrent UTI 2024     Past Medical History:   Diagnosis Date    Seizures                Patient is NPO for a scheduled procedure (EGD) with GI. Will follow up at next opportunity.               Rolando Turner M.S. CCC-SLP  [facility]

## 2025-07-09 NOTE — TRANSFER OF CARE
"Anesthesia Transfer of Care Note    Patient: Anuj Caldwell    Procedure(s) Performed: Procedure(s) (LRB):  EGD (ESOPHAGOGASTRODUODENOSCOPY) (Left)    Patient location: GI    Anesthesia Type: general    Transport from OR: Transported from OR on room air with adequate spontaneous ventilation    Post pain: adequate analgesia    Post assessment: no apparent anesthetic complications    Post vital signs: stable    Level of consciousness: awake    Nausea/Vomiting: no nausea/vomiting    Complications: none    Transfer of care protocol was followed      Last vitals: Visit Vitals  BP (!) 139/100 (BP Location: Left arm)   Pulse 108   Temp 37.1 °C (98.8 °F) (Oral)   Resp 18   Ht 5' 2" (1.575 m)   Wt 86.6 kg (191 lb)   SpO2 (!) 94%   BMI 34.93 kg/m²     "

## 2025-07-09 NOTE — CONSULTS
Gastroenterology/Hepatology Initial Consult Note    Patient Name: Anuj Caldwell  Age: 39 y.o.  : 1985  MRN: [id number]  Admission Date: 2025    Reason for Consult:      Medical Management  Chief Complaint   Patient presents with    Shortness of Breath     Sent from urgent care for SOB, wheezing and R/O UTI         Self, Aaareferral    HPI:     Anuj Caldwell is a 39 y.o. male w Pmhx of Friedreich's ataxia, neurogenic bladder, and hypertension who was admitted on 25 for suspected aspiration pneumonia in setting of persistent coughing at home. Coughing at home was constant, regardless of PO intake with small bites however mother felt that he did aspirate on piece of meat about 1 week prior to hospitalization. Patient was transitioned to pureed diet following this. Cough became productive with yellow sputum following possible aspiration event. She denied fevers, chills, vomiting and diarrhea. Patient is currently being treated for aspiration PNA as well as UTI. SLP consulted for MBSS, though not mentioned in consult note, concern for esophageal dysphagia due to residual food bolus near mid esophagus on Xray imaging. He is currently NPO.      Past Medical History:   Diagnosis Date    Primary hypertension 2023    Seizures         Past Surgical History:   Procedure Laterality Date    WISDOM TOOTH EXTRACTION          Family History   Problem Relation Name Age of Onset    Hypertension Mother      Arrhythmia Father      Heart attack Father      Hypertension Father      Arrhythmia Sister      Heart attack Maternal Grandmother      Stroke Maternal Grandmother      Heart attack Maternal Grandfather         Social History     Tobacco Use    Smoking status: Former     Current packs/day: 0.00     Average packs/day: 0.3 packs/day for 10.0 years (2.5 ttl pk-yrs)     Types: Cigarettes     Start date: 1/15/2010     Quit date: 3/25/2014     Years since quittin.2     Passive exposure: Current    Smokeless  tobacco: Never   Substance Use Topics    Alcohol use: No         Review of patient's allergies indicates:   Allergen Reactions    Amoxicillin Other (See Comments)     Pt states does not know reaction (was a baby at that time)  Mother reports high fever        Prescriptions Prior to Admission[1]      INPATIENT MEDICATIONS    Scheduled Meds:   aspirin  81 mg Oral Daily    azithromycin  500 mg Intravenous Daily    ceFEPime IV (PEDS and ADULTS)  2 g Intravenous Q8H    cetirizine  10 mg Oral Daily    cyclobenzaprine  10 mg Oral QHS    diltiaZEM  120 mg Oral Daily    docusate sodium  100 mg Oral BID    enoxparin  40 mg Subcutaneous Daily    fluticasone propionate  2 spray Each Nostril BID    folic acid  1 mg Oral Daily    losartan  50 mg Oral Daily    metoprolol succinate  100 mg Oral Daily    metroNIDAZOLE IV (PEDS and ADULTS)  500 mg Intravenous Q8H    mirtazapine  15 mg Oral QHS    mupirocin   Nasal BID    NON FORMULARY MEDICATION 1 tablet  1 tablet Oral TID    NON FORMULARY MEDICATION 2 mg  2 mg Oral QHS    NON FORMULARY MEDICATION 20 mg  20 mg Oral QAM    OXcarbazepine  600 mg Oral BID    pantoprazole  40 mg Oral Daily     Continuous Infusions:  PRN Meds:    Current Facility-Administered Medications:     dextrose 50%, 12.5 g, Intravenous, PRN    dextrose 50%, 25 g, Intravenous, PRN    glucagon (human recombinant), 1 mg, Intramuscular, PRN    glucose, 16 g, Oral, PRN    glucose, 24 g, Oral, PRN    guaiFENesin 100 mg/5 ml, 200 mg, Oral, Q4H PRN    hydrOXYzine pamoate, 25 mg, Oral, BID PRN    naloxone, 0.02 mg, Intravenous, PRN    sodium chloride 0.9%, 10 mL, Intravenous, Q12H PRN          Review of Systems:       Review of Systems   Unable to perform ROS: Other          Objective:       VITAL SIGNS: 24 HR MIN & MAX LAST    Temp  Min: 97.3 °F (36.3 °C)  Max: 98.8 °F (37.1 °C)  98.1 °F (36.7 °C)        BP  Min: 90/59  Max: 147/98  (!) 147/98     Pulse  Min: 82  Max: 100  92     Resp  Min: 15  Max: 21  15    SpO2  Min: 92  "%  Max: 100 %  100 %        Physical Exam  Vitals and nursing note reviewed.   Constitutional:       General: He is awake. He is not in acute distress.     Appearance: He is overweight.      Interventions: Nasal cannula in place.   HENT:      Head: Normocephalic and atraumatic.      Mouth/Throat:      Mouth: Mucous membranes are moist.      Pharynx: Oropharynx is clear.   Eyes:      General: No scleral icterus.  Cardiovascular:      Rate and Rhythm: Regular rhythm. Tachycardia present.   Pulmonary:      Effort: Pulmonary effort is normal. No respiratory distress.   Abdominal:      General: Abdomen is flat. Bowel sounds are normal. There is no distension.      Palpations: Abdomen is soft.      Tenderness: There is no abdominal tenderness. There is no guarding.   Musculoskeletal:      Comments: +paraplegic    Skin:     General: Skin is warm and dry.      Capillary Refill: Capillary refill takes less than 2 seconds.      Coloration: Skin is not jaundiced.      Findings: No lesion or rash.   Neurological:      Mental Status: He is alert. Mental status is at baseline.              LABS:      Recent Labs   Lab 07/07/25 1427 07/08/25  0330 07/09/25 0319   WBC 8.59 8.93 10.26   HGB 16.4 15.8 15.7   HCT 47.8 45.7 45.8    352 351   MCV 83.0 82.2 83.0       Recent Labs   Lab 07/07/25 1427 07/08/25  0330 07/09/25  0319   HGB 16.4 15.8 15.7   HCT 47.8 45.7 45.8        Recent Labs   Lab 07/07/25  1427 07/08/25  0330 07/09/25  0319   * 129* 135*   K 3.7 4.4 3.9   CO2 26 20* 24   BUN 9.8 8.9 11.9   CREATININE 0.92 0.77 0.90   BILITOT 0.4 0.5 0.5   ALKPHOS 139 123 110   AST 20 19 20   ALT 24 21 25   ALBUMIN 3.9 3.6 3.6        No results for input(s): "INR", "PROTIME", "PTT" in the last 168 hours.     No results for input(s): "IRON", "FERRITIN" in the last 168 hours.       IMAGING:   Fl Modified Barium Swallow Speech  Result Date: 7/8/2025  See procedure notes from Speech Pathologist. This procedure was " auto-finalized.    CT Chest Without Contrast  Result Date: 7/7/2025  EXAMINATION: CT CHEST WITHOUT CONTRAST CLINICAL HISTORY: Aspiration;Respiratory illness, nondiagnostic xray; TECHNIQUE: Low dose axial images, sagittal and coronal reformations were obtained from the thoracic inlet to the lung bases. Contrast was not administered.  Automatic exposure control is utilized to reduce patient radiation exposure. COMPARISON: 07/07/2025 chest x-ray FINDINGS: There is a bilateral lower lobe interstitial pneumonia.  This is slightly worse on the right.  There is developing consolidation in the right lower lobe.  No mass is seen.  No lesion is seen.  No pleural effusion is seen.  No pleural thickening is seen.  The mediastinum appears grossly unremarkable.  No abnormal lymphadenopathy is seen.  Thoracic aorta appears grossly unremarkable.  Heart appears normal. Visualized portions of the upper abdomen show no acute abnormality.     Bilateral lower lobe pneumonia appears acute Electronically signed by: Long Arauz Date:    07/07/2025 Time:    15:58    X-Ray Chest 1 View  Result Date: 7/7/2025  EXAMINATION: XR CHEST 1 VIEW CLINICAL HISTORY: Cough, unspecified COMPARISON: 07/29/2021 FINDINGS: Single view of the chest shows central vascular congestion with prominent interstitial opacities in the mid lower lungs.  No pneumothorax or large effusion.  Heart is upper normal in size.     Findings suggestive of pulmonary edema or atypical infection Electronically signed by: Mejia Ordonez MD Date:    07/07/2025 Time:    15:02        Assessment / Plan:     Rule out Esophageal Dysphagia   Hx of Friedreich's ataxia and neurogenic bladder s/p suprapubic catheter   - Continue NPO status   - Plan for EGD today   - Attending attestation to follow         Margarito Camargo MD  [facility] HO[facility]           [1]   Medications Prior to Admission   Medication Sig Dispense Refill Last Dose/Taking    aspirin (ECOTRIN) 81 MG EC tablet Take  1 tablet (81 mg total) by mouth once daily.       brexpiprazole (REXULTI) 2 mg Tab Take 2 mg by mouth.       cetirizine (ZYRTEC) 10 MG tablet Take 1 tablet (10 mg total) by mouth once daily. 30 tablet 0     cyclobenzaprine (FLEXERIL) 10 MG tablet Take 1 tablet (10 mg total) by mouth every evening. 90 tablet 1     diltiaZEM (DILACOR XR) 240 MG CDCR Take 1 capsule (240 mg total) by mouth 2 (two) times a day. 60 capsule 11     docusate sodium (COLACE) 100 MG capsule Take 100 mg by mouth 2 (two) times daily.       fluticasone propionate (FLONASE) 50 mcg/actuation nasal spray 2 sprays (100 mcg total) by Each Nostril route 2 (two) times daily. 16 g 11     folic acid (FOLVITE) 1 MG tablet Take 1 mg by mouth once daily.       hydrOXYzine pamoate (VISTARIL) 25 MG Cap Take 25 mg by mouth 2 (two) times daily as needed.       levoFLOXacin (LEVAQUIN) 750 MG tablet Take 1 tablet (750 mg total) by mouth once daily. (Patient not taking: Reported on 3/22/2024) 7 tablet 0     losartan (COZAAR) 50 MG tablet Take 50 mg by mouth once daily.       metoprolol succinate (TOPROL-XL) 25 MG 24 hr tablet Take 100 mg by mouth once daily at 6am.       mirtazapine (REMERON) 30 MG tablet Take 1 tablet (30 mg total) by mouth every evening. 30 tablet 0     mupirocin (BACTROBAN) 2 % ointment Apply topically 2 (two) times daily. 30 g 1     ondansetron (ZOFRAN-ODT) 8 MG TbDL Take 1 tablet (8 mg total) by mouth 2 (two) times daily. 15 tablet 1     OXcarbazepine (TRILEPTAL) 600 MG Tab Take 1 tablet (600 mg total) by mouth 2 (two) times daily as needed (Friedreich ataxia). 60 tablet 1     pantoprazole (PROTONIX) 40 MG tablet Take 1 tablet (40 mg total) by mouth once daily. 90 tablet 1     phenazopyridine (PYRIDIUM) 200 MG tablet Take 200 mg by mouth daily as needed. (Patient not taking: Reported on 7/7/2025)       tacrolimus (PROTOPIC) 0.1 % ointment Apply topically 2 (two) times daily. (Patient not taking: Reported on 7/7/2025) 100 g 1     triamcinolone  acetonide 0.1% (KENALOG) 0.1 % cream Apply topically 2 (two) times daily. 453.6 g 1     UNABLE TO FIND DME name: Hospital bed mattress  Duration: 99 months  Diagnosis: Friedreich ataxia, code: G11.11 1 each 0     UNABLE TO FIND Hospital bed mattress, length of need -  99, dx CodeG11.11 Fredreich 's Ataxia 1 each 0     UNABLE TO FIND Hospital bed mattress,length of need -  99, dx CodeG11.11 Fredreich 's Ataxia 1 each 0     UNABLE TO FIND Diagnosis: Friedreich's Ataxia  DME: Wheel chair  Duration: Lifetime  Specification:    -Head and neck rest   -with tilt mechanism   -adult regular size to fit patient's need. 1 Device 0     vortioxetine (TRINTELLIX) 20 mg Tab Take 20 mg by mouth.

## 2025-07-09 NOTE — TRANSFER OF CARE
"Anesthesia Transfer of Care Note    Patient: Anuj Caldwell    Procedure(s) Performed: Procedure(s) (LRB):  EGD (ESOPHAGOGASTRODUODENOSCOPY) (Left)    Patient location: GI    Anesthesia Type: general    Transport from OR: Transported from OR on room air with adequate spontaneous ventilation    Post pain: adequate analgesia    Post assessment: no apparent anesthetic complications    Post vital signs: stable    Level of consciousness: awake    Nausea/Vomiting: no nausea/vomiting    Complications: none    Transfer of care protocol was followed      Last vitals: Visit Vitals  /76   Pulse 105   Temp 37.1 °C (98.8 °F) (Oral)   Resp 19   Ht 5' 2" (1.575 m)   Wt 86.9 kg (191 lb 9.3 oz)   SpO2 99%   BMI 35.04 kg/m²     "

## 2025-07-09 NOTE — PROGRESS NOTES
[facility] Wards Progress Note     Resident Team: [facility] FM List   Attending Physician: Laura Calles MD  Resident: Axel Davis MD; Dr. Mccord    Subjective:      Brief HPI:  Anuj Caldwell is a 39 y.o.  male with a history of Friedreich's ataxia, neurogenic bladder, and hypertension who presented on 7/7/2025  with c/o  dark urine and cough onset 1 week ago.  Mother states the patient's urine has been dark, with visible sediment.  Patient has a suprapubic catheter in place . He has been coughing significantly more.  Cough has produced a pale yellow sputum. .  There was an occasion about week ago where the patient did spit up some meat, which least 2 mother feeling some concern for aspiration.  He has not had any fevers at home, he denies any abdominal pain.  There has been no nausea, vomiting, or diarrhea.  Of note, patient had urine cultures positive for Pseudomonas and Klebsiella in March of 2024.     In the ED, initial pulse was 79, blood pressure 136/92, temperature 98.4° F, satting 94% on room air.  Oxygen began to desaturate to the low 90s he was placed on 2 L nasal cannula.  Patient was also treated with DuoNebs.  Saturations began to be in the mid to high 90s on 2 L. white count was unremarkable at 8.59.  H&H was normal.  Sodium 131, potassium 3.7, all other electrolytes within normal limits.  Lactic acid was found to be normal, as was the BNP.  His urine was turbid, had 2+ protein, 3+ glucose, 500 leuks esterase, and greater than 100 RBCs and white blood cells.  Many bacteria were also noted.  The ED did draw 2 sets of blood cultures and urine culture.  Says x-ray and chest CT were done and showed evidence of potential atypical infection bilaterally.  Family medicine inpatient team was consulted for admission.      Interval History:     NAEON. Resp status mildly improved. Speech cleared pt for liquid diet yesterday evening ; during the barium swallow a food bolus was noted. ST recommended GI  "consult. Pt has home psych meds now.     Review of Sytems:  ROS completed and negative except as indicated above.     Objective:     Last 24 Hour Vital Signs:  BP  Min: 90/59  Max: 147/98  Temp  Av.1 °F (36.7 °C)  Min: 97.3 °F (36.3 °C)  Max: 98.8 °F (37.1 °C)  Pulse  Av.6  Min: 82  Max: 100  Resp  Av  Min: 15  Max: 21  SpO2  Av %  Min: 92 %  Max: 100 %  Height  Av' 2" (157.5 cm)  Min: 5' 2" (157.5 cm)  Max: 5' 2" (157.5 cm)  Weight  Av.9 kg (191 lb 9.3 oz)  Min: 86.9 kg (191 lb 9.3 oz)  Max: 86.9 kg (191 lb 9.3 oz)  I/O last 3 completed shifts:  In: 360.8 [IV Piggyback:360.8]  Out: 1625 [Urine:1625]    Physical Examination:  Physical Exam  Vitals reviewed.   Cardiovascular:      Rate and Rhythm: Normal rate and regular rhythm.      Heart sounds: No murmur heard.  Pulmonary:      Breath sounds: Wheezing and rhonchi present.      Comments: Bilateral wheezes . On NC  Abdominal:      General: There is distension.      Palpations: There is no mass.      Tenderness: There is no abdominal tenderness.      Comments: Suprapubic cath in place    Musculoskeletal:      Right lower leg: No edema.      Left lower leg: No edema.   Skin:     General: Skin is warm.      Coloration: Skin is not jaundiced.      Findings: No erythema or lesion.   Neurological:      Mental Status: He is alert.         Laboratory:  Most Recent Data:  CBC:   Lab Results   Component Value Date    WBC 10.26 2025    HGB 15.7 2025    HCT 45.8 2025     2025    MCV 83.0 2025    RDW 13.2 2025     WBC Differential:   Recent Labs   Lab 25  1427 25  0330 25  0319   WBC 8.59 8.93 10.26   HGB 16.4 15.8 15.7   HCT 47.8 45.7 45.8    352 351   MCV 83.0 82.2 83.0     BMP:   Lab Results   Component Value Date     (L) 2025    K 3.9 2025    CL 98 2025    CO2 24 2025    BUN 11.9 2025    CREATININE 0.90 2025     (H) 2025    " "CALCIUM 9.0 07/09/2025    MG 2.40 07/31/2021    PHOS 3.3 07/26/2021     LFTs:   Lab Results   Component Value Date    PROT 7.6 07/09/2025    ALBUMIN 3.6 07/09/2025    BILITOT 0.5 07/09/2025    AST 20 07/09/2025    ALKPHOS 110 07/09/2025    ALT 25 07/09/2025     Coags:   Lab Results   Component Value Date    INR 1.0 05/28/2024    PROTIME 13.1 02/24/2021     FLP:   Lab Results   Component Value Date    CHOL 181 02/26/2021    HDL 41 02/26/2021    TRIG 199 (H) 02/26/2021     DM:   Lab Results   Component Value Date    HGBA1C 5.1 06/06/2023    CREATININE 0.90 07/09/2025     Thyroid:   Lab Results   Component Value Date    TSH 0.5214 07/30/2021      Anemia: No results found for: "IRON", "TIBC", "FERRITIN", "SATURATEDIRO"  No results found for: "PEMZBZMR03"  No results found for: "FOLATE"     Cardiac:   Lab Results   Component Value Date    TROPONINI 0.26 (H) 06/13/2024    CKTOTAL 289 (H) 09/04/2015    CKMB 0.5 09/04/2015    BNP 32.0 07/07/2025         Microbiology Data:  Microbiology Results (last 7 days)       Procedure Component Value Units Date/Time    Urine culture [[id number]]  (Abnormal) Collected: 07/07/25 1429    Order Status: Completed Specimen: Urine, Supra Pubic Updated: 07/09/25 0719     Urine Culture >/= 100,000 colonies/ml Gram-negative Rods    Blood culture #1 **CANNOT BE ORDERED STAT** [[id number]]  (Normal) Collected: 07/07/25 1411    Order Status: Completed Specimen: Blood from Antecubital, Right Updated: 07/08/25 1901     Blood Culture No Growth At 24 Hours    Blood culture #2 **CANNOT BE ORDERED STAT** [[id number]]  (Normal) Collected: 07/07/25 1427    Order Status: Completed Specimen: Blood from Antecubital, Left Updated: 07/08/25 1901     Blood Culture No Growth At 24 Hours    Respiratory Culture [[id number]]     Order Status: Sent Specimen: Sputum              Other Results:  EKG   Results for orders placed or performed during the hospital encounter of 01/17/17   EKG 12-lead    Collection Time: " 01/17/17  2:24 PM    Narrative    Test Reason : I48.92  Blood Pressure : mmHG  Vent. Rate : 081 BPM     Atrial Rate : 081 BPM     P-R Int : 178 ms          QRS Dur : 080 ms      QT Int : 376 ms       P-R-T Axes : 052 077 024 degrees     QTc Int : 436 ms    Normal sinus rhythm  Normal ECG  When compared with ECG of 03-JUN-2016 10:32,  No significant change was found  Confirmed by Ernestina Noyola MD ([id number]) on 1/17/2017 4:10:31 PM    Referred By: ASHWIN NOYOLA           Confirmed By:Ernestina Noyola MD       Radiology:  Imaging Results              CT Chest Without Contrast (Final result)  Result time 07/07/25 15:58:18      Final result by Jessica Arauz MD (07/07/25 15:58:18)                   Impression:      Bilateral lower lobe pneumonia appears acute      Electronically signed by: Long Arauz  Date:    07/07/2025  Time:    15:58               Narrative:    EXAMINATION:  CT CHEST WITHOUT CONTRAST    CLINICAL HISTORY:  Aspiration;Respiratory illness, nondiagnostic xray;    TECHNIQUE:  Low dose axial images, sagittal and coronal reformations were obtained from the thoracic inlet to the lung bases. Contrast was not administered.  Automatic exposure control is utilized to reduce patient radiation exposure.    COMPARISON:  07/07/2025 chest x-ray    FINDINGS:  There is a bilateral lower lobe interstitial pneumonia.  This is slightly worse on the right.  There is developing consolidation in the right lower lobe.  No mass is seen.  No lesion is seen.  No pleural effusion is seen.  No pleural thickening is seen.  The mediastinum appears grossly unremarkable.  No abnormal lymphadenopathy is seen.  Thoracic aorta appears grossly unremarkable.  Heart appears normal.    Visualized portions of the upper abdomen show no acute abnormality.                                       X-Ray Chest 1 View (Final result)  Result time 07/07/25 15:02:55      Final result by Mejia Ordonez MD (07/07/25 15:02:55)                    Impression:      Findings suggestive of pulmonary edema or atypical infection      Electronically signed by: Mejia Ordonez MD  Date:    07/07/2025  Time:    15:02               Narrative:    EXAMINATION:  XR CHEST 1 VIEW    CLINICAL HISTORY:  Cough, unspecified    COMPARISON:  07/29/2021    FINDINGS:  Single view of the chest shows central vascular congestion with prominent interstitial opacities in the mid lower lungs.  No pneumothorax or large effusion.  Heart is upper normal in size.                                      Current Medications:     Infusions:       Scheduled:   aspirin  81 mg Oral Daily    azithromycin  500 mg Intravenous Daily    ceFEPime IV (PEDS and ADULTS)  2 g Intravenous Q8H    cetirizine  10 mg Oral Daily    cyclobenzaprine  10 mg Oral QHS    diltiaZEM  120 mg Oral Daily    docusate sodium  100 mg Oral BID    enoxparin  40 mg Subcutaneous Daily    fluticasone propionate  2 spray Each Nostril BID    folic acid  1 mg Oral Daily    losartan  50 mg Oral Daily    metoprolol succinate  100 mg Oral Daily    metroNIDAZOLE IV (PEDS and ADULTS)  500 mg Intravenous Q8H    mirtazapine  15 mg Oral QHS    mupirocin   Nasal BID    NON FORMULARY MEDICATION 1 tablet  1 tablet Oral TID    NON FORMULARY MEDICATION 2 mg  2 mg Oral QHS    NON FORMULARY MEDICATION 20 mg  20 mg Oral QAM    OXcarbazepine  600 mg Oral BID    pantoprazole  40 mg Oral Daily        PRN:    Current Facility-Administered Medications:     dextrose 50%, 12.5 g, Intravenous, PRN    dextrose 50%, 25 g, Intravenous, PRN    glucagon (human recombinant), 1 mg, Intramuscular, PRN    glucose, 16 g, Oral, PRN    glucose, 24 g, Oral, PRN    guaiFENesin 100 mg/5 ml, 200 mg, Oral, Q4H PRN    hydrOXYzine pamoate, 25 mg, Oral, BID PRN    naloxone, 0.02 mg, Intravenous, PRN    sodium chloride 0.9%, 10 mL, Intravenous, Q12H PRN      Assessment & Plan:     Urinary tract infection  - dark urine noted at home and on exam  -urinalysis showing turbid urine with  blood, leuks esterase, white blood cells, and bacteria  - prior cultures grew Pseudomonas and Klebsiella  - Those organisms were sensitive to cefepime, Zosyn, meropenem, and tobramycin.  - on cefepime, day 2,  as patient does have a documented penicillin allergy  - urine cultures -> 100k GNR       Cough  - differential includes viral illness, COVID, atypical pneumonia  - chest x-ray and CT chest consistent with this   - Chest CT showed bilateral lower lobe infiltrates  - patient already on cefepime, which has good strep and staph coverage (though no MRSA)  - azithro day 2   - flagyl day 2 for possible aspiration   - duonebs ordered  - consider steroids  - sputum cultures to be collected  - MRSA negative, covid flu rsv negative, resp panel neg  - blood cultures neg at 24hr     Friedreich's ataxia  - chronic condition, has multiple psych medications at home  - home psych meds provided by family  - subacute catheter is in place, will need repeated after this urine infection resolved is treated  - continue his home folic acid, docusate, cyclobenzaprine, and Zyrtec  - Flonase also been able  - Speech recommends liquid diet and GI consult  - GI consulted  - Food bolus noted during barium swallow      Hypertension  - well controlled   - patient taking diltiazem 120 mg, and losartan 50 mg, and metoprolol succinate 100 mg  -restart patient's home blood pressure medications       CODE STATUS: Full Code   Diet:NPO until cleared by speech   DVT Prophylaxis:   Anticoagulants   Medication Route Frequency    enoxaparin injection 40 mg Subcutaneous Daily         Axel Davis MD  [facility], PGY-3

## 2025-07-09 NOTE — CARE UPDATE
Transition of Care Progress Note      Dr. Burgess and I have received checkout from Dr. Davis regarding this patient.     HO1 assessment:  Admission diagnosis: Cough [R05.9]  Hypoxia [R09.02]  Chest pain [R07.9]  Complicated UTI (urinary tract infection) [N39.0]  Pneumonia of both lower lobes due to infectious organism [J18.9]       Overnight management:   Monitor vitals. PM meds as scheduled. Cefepime q8h x 14 d (3/14). DuoNeb on board. Diet minced & moist. Encourage IS.     Code status:   Full    Please call [phone] from 07/09/2025 4PM to 07/10/2025 7AM if any issues arise.    Jovany Ruiz MD  [facility], [facility], HO-1  07/09/2025

## 2025-07-09 NOTE — CARE UPDATE
Transition of Care Progress Note      Dr. Burgess and I have received checkout from Dr. Davis regarding this patient.     HO1 assessment:  Admission diagnosis: Cough [R05.9]  Hypoxia [R09.02]  Chest pain [R07.9]  Complicated UTI (urinary tract infection) [N39.0]  Pneumonia of both lower lobes due to infectious organism [J18.9]       Overnight management:   Monitor vitals. PM meds as scheduled. Cefepime q8h x 14 d (2/14). DuoNeb at 2000. ADAT once received green light from GI    Code status:   Full    Please call [phone] from 07/08/2025 4PM to 07/09/2025 7AM if any issues arise.    Jovany Ruiz MD  [facility], [facility], HO-1  07/08/2025

## 2025-07-09 NOTE — PROGRESS NOTES
[facility] Wards Progress Note     Resident Team: [facility] FM List   Attending Physician: Laura Calles MD  Resident: Axel Davis MD; Dr. Mccord    Subjective:      Brief HPI:  Anuj Caldwell is a 39 y.o.  male with a history of Friedreich's ataxia, neurogenic bladder, and hypertension who presented on 7/7/2025  with c/o  dark urine and cough onset 1 week ago.  Mother states the patient's urine has been dark, with visible sediment.  Patient has a suprapubic catheter in place . He has been coughing significantly more.  Cough has produced a pale yellow sputum. .  There was an occasion about week ago where the patient did spit up some meat, which least 2 mother feeling some concern for aspiration.  He has not had any fevers at home, he denies any abdominal pain.  There has been no nausea, vomiting, or diarrhea.  Of note, patient had urine cultures positive for Pseudomonas and Klebsiella in March of 2024.     In the ED, initial pulse was 79, blood pressure 136/92, temperature 98.4° F, satting 94% on room air.  Oxygen began to desaturate to the low 90s he was placed on 2 L nasal cannula.  Patient was also treated with DuoNebs.  Saturations began to be in the mid to high 90s on 2 L. white count was unremarkable at 8.59.  H&H was normal.  Sodium 131, potassium 3.7, all other electrolytes within normal limits.  Lactic acid was found to be normal, as was the BNP.  His urine was turbid, had 2+ protein, 3+ glucose, 500 leuks esterase, and greater than 100 RBCs and white blood cells.  Many bacteria were also noted.  The ED did draw 2 sets of blood cultures and urine culture.  Says x-ray and chest CT were done and showed evidence of potential atypical infection bilaterally.  Family medicine inpatient team was consulted for admission.      Interval History:   NAEON. VSS. Home meds brought in. Pt lungs are sounding slightly better.         Review of Systems:  ROS completed and negative except as indicated above.    "  Objective:     Last 24 Hour Vital Signs:  BP  Min: 90/59  Max: 147/98  Temp  Av.1 °F (36.7 °C)  Min: 97.3 °F (36.3 °C)  Max: 98.8 °F (37.1 °C)  Pulse  Av.6  Min: 82  Max: 100  Resp  Av  Min: 15  Max: 21  SpO2  Av %  Min: 92 %  Max: 100 %  Height  Av' 2" (157.5 cm)  Min: 5' 2" (157.5 cm)  Max: 5' 2" (157.5 cm)  Weight  Av.9 kg (191 lb 9.3 oz)  Min: 86.9 kg (191 lb 9.3 oz)  Max: 86.9 kg (191 lb 9.3 oz)  I/O last 3 completed shifts:  In: 360.8 [IV Piggyback:360.8]  Out: 1625 [Urine:1625]    Physical Examination:  Physical Exam  Vitals reviewed.   Cardiovascular:      Rate and Rhythm: Normal rate and regular rhythm.      Heart sounds: No murmur heard.  Pulmonary:      Breath sounds: Wheezing and rhonchi present.      Comments: Bilateral wheezes . On NC  Abdominal:      General: There is distension.      Palpations: There is no mass.      Tenderness: There is no abdominal tenderness.      Comments: Suprapubic cath in place    Musculoskeletal:      Right lower leg: No edema.      Left lower leg: No edema.   Skin:     General: Skin is warm.      Coloration: Skin is not jaundiced.      Findings: No erythema or lesion.   Neurological:      Mental Status: He is alert.         Laboratory:  Most Recent Data:  CBC:   Lab Results   Component Value Date    WBC 10.26 2025    HGB 15.7 2025    HCT 45.8 2025     2025    MCV 83.0 2025    RDW 13.2 2025     WBC Differential:   Recent Labs   Lab 25  1427 25  0330 25  0319   WBC 8.59 8.93 10.   HGB 16.4 15.8 15.7   HCT 47.8 45.7 45.8    352 351   MCV 83.0 82.2 83.0     BMP:   Lab Results   Component Value Date     (L) 2025    K 3.9 2025    CL 98 2025    CO2 24 2025    BUN 11.9 2025    CREATININE 0.90 2025     (H) 2025    CALCIUM 9.0 2025    MG 2.40 2021    PHOS 3.3 2021     LFTs:   Lab Results   Component Value Date    " "PROT 7.6 07/09/2025    ALBUMIN 3.6 07/09/2025    BILITOT 0.5 07/09/2025    AST 20 07/09/2025    ALKPHOS 110 07/09/2025    ALT 25 07/09/2025     Coags:   Lab Results   Component Value Date    INR 1.0 05/28/2024    PROTIME 13.1 02/24/2021     FLP:   Lab Results   Component Value Date    CHOL 181 02/26/2021    HDL 41 02/26/2021    TRIG 199 (H) 02/26/2021     DM:   Lab Results   Component Value Date    HGBA1C 5.1 06/06/2023    CREATININE 0.90 07/09/2025     Thyroid:   Lab Results   Component Value Date    TSH 0.5214 07/30/2021      Anemia: No results found for: "IRON", "TIBC", "FERRITIN", "SATURATEDIRO"  No results found for: "IINQTKLX15"  No results found for: "FOLATE"     Cardiac:   Lab Results   Component Value Date    TROPONINI 0.26 (H) 06/13/2024    CKTOTAL 289 (H) 09/04/2015    CKMB 0.5 09/04/2015    BNP 32.0 07/07/2025         Microbiology Data:  Microbiology Results (last 7 days)       Procedure Component Value Units Date/Time    Urine culture [[id number]]  (Abnormal) Collected: 07/07/25 1429    Order Status: Completed Specimen: Urine, Supra Pubic Updated: 07/09/25 0719     Urine Culture >/= 100,000 colonies/ml Gram-negative Rods    Blood culture #1 **CANNOT BE ORDERED STAT** [[id number]]  (Normal) Collected: 07/07/25 1411    Order Status: Completed Specimen: Blood from Antecubital, Right Updated: 07/08/25 1901     Blood Culture No Growth At 24 Hours    Blood culture #2 **CANNOT BE ORDERED STAT** [[id number]]  (Normal) Collected: 07/07/25 1427    Order Status: Completed Specimen: Blood from Antecubital, Left Updated: 07/08/25 1901     Blood Culture No Growth At 24 Hours    Respiratory Culture [[id number]]     Order Status: Sent Specimen: Sputum              Other Results:  EKG   Results for orders placed or performed during the hospital encounter of 01/17/17   EKG 12-lead    Collection Time: 01/17/17  2:24 PM    Narrative    Test Reason : I48.92  Blood Pressure : mmHG  Vent. Rate : 081 BPM     Atrial Rate : 081 " BPM     P-R Int : 178 ms          QRS Dur : 080 ms      QT Int : 376 ms       P-R-T Axes : 052 077 024 degrees     QTc Int : 436 ms    Normal sinus rhythm  Normal ECG  When compared with ECG of 03-JUN-2016 10:32,  No significant change was found  Confirmed by Ernestina Noyola MD ([id number]) on 1/17/2017 4:10:31 PM    Referred By: ASHWIN NOYOLA           Confirmed By:Ernestina Noyola MD       Radiology:  Imaging Results              CT Chest Without Contrast (Final result)  Result time 07/07/25 15:58:18      Final result by Jessica Arauz MD (07/07/25 15:58:18)                   Impression:      Bilateral lower lobe pneumonia appears acute      Electronically signed by: Long Arauz  Date:    07/07/2025  Time:    15:58               Narrative:    EXAMINATION:  CT CHEST WITHOUT CONTRAST    CLINICAL HISTORY:  Aspiration;Respiratory illness, nondiagnostic xray;    TECHNIQUE:  Low dose axial images, sagittal and coronal reformations were obtained from the thoracic inlet to the lung bases. Contrast was not administered.  Automatic exposure control is utilized to reduce patient radiation exposure.    COMPARISON:  07/07/2025 chest x-ray    FINDINGS:  There is a bilateral lower lobe interstitial pneumonia.  This is slightly worse on the right.  There is developing consolidation in the right lower lobe.  No mass is seen.  No lesion is seen.  No pleural effusion is seen.  No pleural thickening is seen.  The mediastinum appears grossly unremarkable.  No abnormal lymphadenopathy is seen.  Thoracic aorta appears grossly unremarkable.  Heart appears normal.    Visualized portions of the upper abdomen show no acute abnormality.                                       X-Ray Chest 1 View (Final result)  Result time 07/07/25 15:02:55      Final result by Mejia Ordonez MD (07/07/25 15:02:55)                   Impression:      Findings suggestive of pulmonary edema or atypical infection      Electronically signed by: Mejia Ordonez  MD  Date:    07/07/2025  Time:    15:02               Narrative:    EXAMINATION:  XR CHEST 1 VIEW    CLINICAL HISTORY:  Cough, unspecified    COMPARISON:  07/29/2021    FINDINGS:  Single view of the chest shows central vascular congestion with prominent interstitial opacities in the mid lower lungs.  No pneumothorax or large effusion.  Heart is upper normal in size.                                      Current Medications:     Infusions:       Scheduled:   aspirin  81 mg Oral Daily    azithromycin  500 mg Intravenous Daily    ceFEPime IV (PEDS and ADULTS)  2 g Intravenous Q8H    cetirizine  10 mg Oral Daily    cyclobenzaprine  10 mg Oral QHS    diltiaZEM  120 mg Oral Daily    docusate sodium  100 mg Oral BID    enoxparin  40 mg Subcutaneous Daily    fluticasone propionate  2 spray Each Nostril BID    folic acid  1 mg Oral Daily    losartan  50 mg Oral Daily    metoprolol succinate  100 mg Oral Daily    metroNIDAZOLE IV (PEDS and ADULTS)  500 mg Intravenous Q8H    mirtazapine  15 mg Oral QHS    mupirocin   Nasal BID    NON FORMULARY MEDICATION 1 tablet  1 tablet Oral TID    NON FORMULARY MEDICATION 2 mg  2 mg Oral QHS    NON FORMULARY MEDICATION 20 mg  20 mg Oral QAM    OXcarbazepine  600 mg Oral BID    pantoprazole  40 mg Oral Daily        PRN:    Current Facility-Administered Medications:     dextrose 50%, 12.5 g, Intravenous, PRN    dextrose 50%, 25 g, Intravenous, PRN    glucagon (human recombinant), 1 mg, Intramuscular, PRN    glucose, 16 g, Oral, PRN    glucose, 24 g, Oral, PRN    guaiFENesin 100 mg/5 ml, 200 mg, Oral, Q4H PRN    hydrOXYzine pamoate, 25 mg, Oral, BID PRN    naloxone, 0.02 mg, Intravenous, PRN    sodium chloride 0.9%, 10 mL, Intravenous, Q12H PRN      Assessment & Plan:     Urinary tract infection  - dark urine noted at home and on exam  -urinalysis showing turbid urine with blood, leuks esterase, white blood cells, and bacteria  - prior cultures grew Pseudomonas and Klebsiella  - Those  organisms were sensitive to cefepime, Zosyn, meropenem, and tobramycin.  - cefepime, day 1,  as patient does have a documented penicillin allergy  - urine cultures -> 100k GNR  - P CBC and CMP in the morning     Cough  - differential includes viral illness, COVID, atypical pneumonia  - chest x-ray and CT chest consistent with this   Chest CT showed bilateral lower lobe infiltrates  - patient already on cefepime, which has good strep and staph coverage (though no MRSA)  - azithro day 1   - flagyl day 0 for possible aspiration   - duonebs ordered  - continue steroids  - sputum cultures and MRSA ordered    - blood cultures are pending x2     Friedreich's ataxia  -chronic condition, has multiple psych medications at home  -mother states she will bring her son psychiatry medicines and tomorrow  -subacute catheter is in place, will need repeated after this urine infection resolved is treated  - continue his home folic acid, docusate, cyclobenzaprine, and Zyrtec  - Flonase also been able  - have speech evaluate for further diet recs     Hypertension  - well controlled   - patient taking diltiazem 120 mg, and losartan 50 mg, and metoprolol succinate 100 mg  -restart patient's home blood pressure medications       CODE STATUS: Full Code   Diet:NPO until cleared by speech   DVT Prophylaxis:   Anticoagulants   Medication Route Frequency    enoxaparin injection 40 mg Subcutaneous Daily         Axel Davis MD  [facility], PGY-3

## 2025-07-09 NOTE — PROVATION PATIENT INSTRUCTIONS
Discharge Summary/Instructions after an Endoscopic Procedure  Patient Name: Anuj Caldwell  Patient MRN: [id number]  Patient YOB: 1985  Wednesday, July 9, 2025  Ladi Armando MD  Dear patient,  As a result of recent federal legislation (The Federal Cures Act), you may   receive lab or pathology results from your procedure in your [facility]   account before your physician is able to contact you. Your physician or   their representative will relay the results to you with their   recommendations at their soonest availability.  Thank you,  RESTRICTIONS:  During your procedure today, you received medications for sedation.  These   medications may affect your judgment, balance and coordination.  Therefore,   for 24 hours, you have the following restrictions:   - DO NOT drive a car, operate machinery, make legal/financial decisions,   sign important papers or drink alcohol.    ACTIVITY:  Today: no heavy lifting, straining or running due to procedural   sedation/anesthesia.  The following day: return to full activity including work.  DIET:  Eat and drink normally unless instructed otherwise.     TREATMENT FOR COMMON SIDE EFFECTS:  - Mild abdominal pain, nausea, belching, bloating or excessive gas:  rest,   eat lightly and use a heating pad.  - Sore Throat: treat with throat lozenges and/or gargle with warm salt   water.  - Because air was used during the procedure, expelling large amounts of air   from your rectum or belching is normal.  - If a bowel prep was taken, you may not have a bowel movement for 1-3 days.    This is normal.  SYMPTOMS TO WATCH FOR AND REPORT TO YOUR PHYSICIAN:  1. Abdominal pain or bloating, other than gas cramps.  2. Chest pain.  3. Back pain.  4. Signs of infection such as: chills or fever occurring within 24 hours   after the procedure.  5. Rectal bleeding, which would show as bright red, maroon, or black stools.   (A tablespoon of blood from the rectum is not serious, especially  if   hemorrhoids are present.)  6. Vomiting.  7. Weakness or dizziness.  GO DIRECTLY TO THE NEAREST EMERGENCY ROOM IF YOU HAVE ANY OF THE FOLLOWING:      Difficulty breathing              Chills and/or fever over 101 F   Persistent vomiting and/or vomiting blood   Severe abdominal pain   Severe chest pain   Black, tarry stools   Bleeding- more than one tablespoon   Any other symptom or condition that you feel may need urgent attention  Your doctor recommends these additional instructions:  If any biopsies were taken, your doctors clinic will contact you in 1 to 2   weeks with any results.  Recommendations:  - Return patient to hospital [facility] for ongoing care.   - Advance diet as tolerated.   - Continue present medications.  Impressions:  - Normal esophagus.   - Z-line regular, 40 cm from the incisors.   - Normal stomach.   - Normal examined duodenum.   - No specimens collected.  For questions, problems or results please call your physician - Ladi Armando MD at Work:  [phone], Work:  [phone].  [facility] , EMERGENCY ROOM PHONE NUMBER: [phone]  IF A COMPLICATION OR EMERGENCY SITUATION ARISES AND YOU ARE UNABLE TO REACH   YOUR PHYSICIAN - GO DIRECTLY TO THE EMERGENCY ROOM.  Ladi Armando MD  7/9/2025 4:20:43 PM  This report has been verified and signed electronically.  Dear patient,  As a result of recent federal legislation (The Federal Cures Act), you may   receive lab or pathology results from your procedure in your [facility]   account before your physician is able to contact you. Your physician or   their representative will relay the results to you with their   recommendations at their soonest availability.  Thank you,  PROVATION

## 2025-07-09 NOTE — ANESTHESIA POSTPROCEDURE EVALUATION
Anesthesia Post Evaluation    Patient: Anuj Caldwell    Procedure(s) Performed: Procedure(s) (LRB):  EGD (ESOPHAGOGASTRODUODENOSCOPY) (Left)    Final Anesthesia Type: general      Patient location during evaluation: GI PACU  Patient participation: Yes- Able to Participate  Level of consciousness: awake and alert  Post-procedure vital signs: reviewed and stable  Pain management: adequate  Airway patency: patent    PONV status at discharge: No PONV  Anesthetic complications: no      Cardiovascular status: blood pressure returned to baseline  Respiratory status: unassisted  Hydration status: euvolemic  Follow-up not needed.              Vitals Value Taken Time   /76 07/09/25 11:34   Temp 37.1 °C (98.8 °F) 07/09/25 11:34   Pulse 105 07/09/25 12:14   Resp 19 07/09/25 12:14   SpO2 99 % 07/09/25 12:14         No case tracking events are documented in the log.      Pain/Sweetie Score: No data recorded

## 2025-07-09 NOTE — ANESTHESIA PREPROCEDURE EVALUATION
07/09/2025  Anuj Caldwell is a 39 y.o., male for EGD --->    concern for esophageal dysphagia due to residual food bolus near mid esophagus on Xray imaging     Vitals:    07/09/25 0753 07/09/25 1134 07/09/25 1145 07/09/25 1214   BP:  120/76     Pulse: 92 110 110 105   Resp: 15 20  19   Temp: 36.7 °C (98.1 °F) 37.1 °C (98.8 °F)     TempSrc: Oral Oral     SpO2: 100% (!) 93% (!) 94% 99%   Weight:       Height:               39 y.o. male with a history of Friedreich's ataxia, neurogenic bladder, and hypertension ; admitted on 7/7/25 for suspected aspiration pneumonia in setting of persistent coughing at home        EXAMINATION:  CT CHEST WITHOUT CONTRAST     CLINICAL HISTORY:  Aspiration;Respiratory illness, nondiagnostic xray;     TECHNIQUE:  Low dose axial images, sagittal and coronal reformations were obtained from the thoracic inlet to the lung bases. Contrast was not administered.  Automatic exposure control is utilized to reduce patient radiation exposure.     COMPARISON:  07/07/2025 chest x-ray     FINDINGS:  There is a bilateral lower lobe interstitial pneumonia.  This is slightly worse on the right.  There is developing consolidation in the right lower lobe.  No mass is seen.  No lesion is seen.  No pleural effusion is seen.  No pleural thickening is seen.  The mediastinum appears grossly unremarkable.  No abnormal lymphadenopathy is seen.  Thoracic aorta appears grossly unremarkable.  Heart appears normal.     Visualized portions of the upper abdomen show no acute abnormality.      Active Ambulatory Problems     Diagnosis Date Noted    Atrial fibrillation 06/30/2015    Atrial flutter 06/30/2015    Primary cardiomyopathy 06/30/2015    Friedreich's ataxia 06/30/2015    S/P ablation of atrial fibrillation 11/20/2015    Neurogenic bladder 08/25/2022    Kidney stones 03/02/2023    History of  fibromyalgia 06/06/2023    Epilepsy 06/06/2023    Gastroesophageal reflux disease 06/06/2023    Primary hypertension 06/06/2023    Schizophrenia 06/06/2023    Spasm 06/06/2023    Symptomatic cholelithiasis 11/26/2019    Major depressive disorder with single episode 06/06/2023    Penile erosion 03/11/2024     Resolved Ambulatory Problems     Diagnosis Date Noted    S/P ablation of atrial flutter 11/20/2015    Depressive disorder 06/06/2023    Recurrent UTI 02/02/2024     Past Medical History:   Diagnosis Date    Seizures 2007         Past Surgical History:   Procedure Laterality Date    WISDOM TOOTH EXTRACTION           Lab Results   Component Value Date    WBC 10.26 07/09/2025    HGB 15.7 07/09/2025    HCT 45.8 07/09/2025     07/09/2025    CHOL 181 02/26/2021    TRIG 199 (H) 02/26/2021    HDL 41 02/26/2021    ALT 25 07/09/2025    AST 20 07/09/2025     (L) 07/09/2025    K 3.9 07/09/2025    CL 98 07/09/2025    CREATININE 0.90 07/09/2025    BUN 11.9 07/09/2025    CO2 24 07/09/2025    TSH 0.5214 07/30/2021    INR 1.0 05/28/2024    HGBA1C 5.1 06/06/2023             Pre-op Assessment    I have reviewed the Patient Summary Reports.     I have reviewed the Nursing Notes. I have reviewed the NPO Status.   I have reviewed the Medications.     Review of Systems  Anesthesia Hx:  No problems with previous Anesthesia   History of prior surgery of interest to airway management or planning:          Denies Family Hx of Anesthesia complications.    Denies Personal Hx of Anesthesia complications.                    Hematology/Oncology:  Hematology Normal   Oncology Normal                                   EENT/Dental:  EENT/Dental Normal           Cardiovascular:  Cardiovascular Normal                                              Pulmonary:  Pulmonary Normal                       Renal/:  Renal/ Normal                 Hepatic/GI:  Hepatic/GI Normal                    Musculoskeletal:  Musculoskeletal  Normal                Neurological:  Neurology Normal                                      Endocrine:  Endocrine Normal            Dermatological:  Skin Normal    Psych:  Psychiatric Normal                  Physical Exam  General: Cooperative, Well nourished, Alert and Oriented    Airway:  Mallampati: IV   Mouth Opening: Small, but > 3cm  TM Distance: 4 - 6 cm  Tongue: Large  Neck ROM: Normal ROM    Chest/Lungs:  Rhonchi  expiratory wheezes    Anesthesia Plan  Type of Anesthesia, risks & benefits discussed:    Anesthesia Type: Gen Natural Airway  Intra-op Monitoring Plan: Standard ASA Monitors  Post Op Pain Control Plan: IV/PO Opioids PRN  (medical reason for not using multimodal pain management)  Induction:  IV  Informed Consent: Informed consent signed with the Patient representative and all parties understand the risks and agree with anesthesia plan.  All questions answered. Patient consented to blood products? No  ASA Score: 4  Day of Surgery Review of History & Physical: H&P Update referred to the surgeon/provider.    Ready For Surgery From Anesthesia Perspective.   .

## 2025-07-09 NOTE — ANESTHESIA POSTPROCEDURE EVALUATION
Anesthesia Post Evaluation    Patient: Anuj Caldwell    Procedure(s) Performed: Procedure(s) (LRB):  EGD (ESOPHAGOGASTRODUODENOSCOPY) (Left)    Final Anesthesia Type: general      Patient location during evaluation: telemetry step down  Patient participation: Yes- Able to Participate  Level of consciousness: awake and alert  Post-procedure vital signs: reviewed and stable  Pain management: adequate  Airway patency: patent  DENISSE mitigation strategies: Preoperative initiation of continuous positive airway pressure (CPAP) or non-invasive positive pressure ventilation (NIPPV) and Intraoperative administration of CPAP, nasopharyngeal airway, or oral appliance during sedation  PONV status at discharge: No PONV  Anesthetic complications: no      Cardiovascular status: hemodynamically stable  Respiratory status: spontaneous ventilation  Hydration status: euvolemic  Follow-up not needed.              Vitals Value Taken Time   /76 07/09/25 11:34   Temp 37.1 °C (98.8 °F) 07/09/25 11:34   Pulse 105 07/09/25 12:14   Resp 19 07/09/25 12:14   SpO2 99 % 07/09/25 12:14         No case tracking events are documented in the log.      Pain/Sweetie Score: No data recorded

## 2025-07-10 LAB
ALBUMIN SERPL-MCNC: 3.3 G/DL (ref 3.5–5)
ALBUMIN/GLOB SERPL: 1 RATIO (ref 1.1–2)
ALP SERPL-CCNC: 97 UNIT/L (ref 40–150)
ALT SERPL-CCNC: 20 UNIT/L (ref 0–55)
ANION GAP SERPL CALC-SCNC: 9 MEQ/L
AST SERPL-CCNC: 20 UNIT/L (ref 11–45)
BACTERIA UR CULT: ABNORMAL
BASOPHILS # BLD AUTO: 0.08 X10(3)/MCL
BASOPHILS NFR BLD AUTO: 1.1 %
BILIRUB SERPL-MCNC: 0.6 MG/DL
BUN SERPL-MCNC: 11.1 MG/DL (ref 8.9–20.6)
CALCIUM SERPL-MCNC: 8.5 MG/DL (ref 8.4–10.2)
CHLORIDE SERPL-SCNC: 104 MMOL/L (ref 98–107)
CO2 SERPL-SCNC: 24 MMOL/L (ref 22–29)
CREAT SERPL-MCNC: 0.84 MG/DL (ref 0.72–1.25)
CREAT/UREA NIT SERPL: 13
EOSINOPHIL # BLD AUTO: 0.15 X10(3)/MCL (ref 0–0.9)
EOSINOPHIL NFR BLD AUTO: 2 %
ERYTHROCYTE [DISTWIDTH] IN BLOOD BY AUTOMATED COUNT: 13.5 % (ref 11.5–17)
GFR SERPLBLD CREATININE-BSD FMLA CKD-EPI: >60 ML/MIN/1.73/M2
GLOBULIN SER-MCNC: 3.4 GM/DL (ref 2.4–3.5)
GLUCOSE SERPL-MCNC: 87 MG/DL (ref 74–100)
HCT VFR BLD AUTO: 44.8 % (ref 42–52)
HGB BLD-MCNC: 15.4 G/DL (ref 14–18)
IMM GRANULOCYTES # BLD AUTO: 0.03 X10(3)/MCL (ref 0–0.04)
IMM GRANULOCYTES NFR BLD AUTO: 0.4 %
LYMPHOCYTES # BLD AUTO: 2.99 X10(3)/MCL (ref 0.6–4.6)
LYMPHOCYTES NFR BLD AUTO: 39.4 %
MCH RBC QN AUTO: 29.1 PG (ref 27–31)
MCHC RBC AUTO-ENTMCNC: 34.4 G/DL (ref 33–36)
MCV RBC AUTO: 84.7 FL (ref 80–94)
MONOCYTES # BLD AUTO: 0.98 X10(3)/MCL (ref 0.1–1.3)
MONOCYTES NFR BLD AUTO: 12.9 %
NEUTROPHILS # BLD AUTO: 3.35 X10(3)/MCL (ref 2.1–9.2)
NEUTROPHILS NFR BLD AUTO: 44.2 %
NRBC BLD AUTO-RTO: 0 %
PLATELET # BLD AUTO: 284 X10(3)/MCL (ref 130–400)
PMV BLD AUTO: 9 FL (ref 7.4–10.4)
POTASSIUM SERPL-SCNC: 3.8 MMOL/L (ref 3.5–5.1)
PROT SERPL-MCNC: 6.7 GM/DL (ref 6.4–8.3)
RBC # BLD AUTO: 5.29 X10(6)/MCL (ref 4.7–6.1)
SODIUM SERPL-SCNC: 137 MMOL/L (ref 136–145)
WBC # BLD AUTO: 7.58 X10(3)/MCL (ref 4.5–11.5)

## 2025-07-10 PROCEDURE — 27000221 HC OXYGEN, UP TO 24 HOURS

## 2025-07-10 PROCEDURE — 85025 COMPLETE CBC W/AUTO DIFF WBC: CPT

## 2025-07-10 PROCEDURE — 99900035 HC TECH TIME PER 15 MIN (STAT)

## 2025-07-10 PROCEDURE — 63600175 PHARM REV CODE 636 W HCPCS

## 2025-07-10 PROCEDURE — 25000003 PHARM REV CODE 250: Performed by: FAMILY MEDICINE

## 2025-07-10 PROCEDURE — 21400001 HC TELEMETRY ROOM

## 2025-07-10 PROCEDURE — 25000003 PHARM REV CODE 250

## 2025-07-10 PROCEDURE — 80053 COMPREHEN METABOLIC PANEL: CPT

## 2025-07-10 PROCEDURE — 94664 DEMO&/EVAL PT USE INHALER: CPT

## 2025-07-10 PROCEDURE — 94761 N-INVAS EAR/PLS OXIMETRY MLT: CPT

## 2025-07-10 PROCEDURE — 36415 COLL VENOUS BLD VENIPUNCTURE: CPT

## 2025-07-10 PROCEDURE — 27000173 HC ACAPELLA DEVICE DH OR DM

## 2025-07-10 PROCEDURE — 25000242 PHARM REV CODE 250 ALT 637 W/ HCPCS

## 2025-07-10 PROCEDURE — 94640 AIRWAY INHALATION TREATMENT: CPT

## 2025-07-10 RX ORDER — CEFTRIAXONE 1 G/1
1 INJECTION, POWDER, FOR SOLUTION INTRAMUSCULAR; INTRAVENOUS
Status: DISCONTINUED | OUTPATIENT
Start: 2025-07-10 | End: 2025-07-14

## 2025-07-10 RX ADMIN — CEFEPIME 2 G: 2 INJECTION, POWDER, FOR SOLUTION INTRAVENOUS at 12:07

## 2025-07-10 RX ADMIN — METOPROLOL SUCCINATE 100 MG: 50 TABLET, EXTENDED RELEASE ORAL at 09:07

## 2025-07-10 RX ADMIN — CEFTRIAXONE SODIUM 1 G: 1 INJECTION, POWDER, FOR SOLUTION INTRAMUSCULAR; INTRAVENOUS at 02:07

## 2025-07-10 RX ADMIN — FOLIC ACID 1 MG: 1 TABLET ORAL at 09:07

## 2025-07-10 RX ADMIN — MIRTAZAPINE 15 MG: 7.5 TABLET, FILM COATED ORAL at 10:07

## 2025-07-10 RX ADMIN — AZITHROMYCIN MONOHYDRATE 500 MG: 500 INJECTION, POWDER, LYOPHILIZED, FOR SOLUTION INTRAVENOUS at 09:07

## 2025-07-10 RX ADMIN — OXCARBAZEPINE 600 MG: 150 TABLET, FILM COATED ORAL at 10:07

## 2025-07-10 RX ADMIN — METRONIDAZOLE 500 MG: 5 INJECTION, SOLUTION INTRAVENOUS at 11:07

## 2025-07-10 RX ADMIN — LOSARTAN POTASSIUM 50 MG: 25 TABLET, FILM COATED ORAL at 09:07

## 2025-07-10 RX ADMIN — CYCLOBENZAPRINE 10 MG: 10 TABLET, FILM COATED ORAL at 10:07

## 2025-07-10 RX ADMIN — OXCARBAZEPINE 600 MG: 150 TABLET, FILM COATED ORAL at 09:07

## 2025-07-10 RX ADMIN — CETIRIZINE HYDROCHLORIDE 10 MG: 10 TABLET, FILM COATED ORAL at 09:07

## 2025-07-10 RX ADMIN — SODIUM CHLORIDE: 9 INJECTION, SOLUTION INTRAVENOUS at 02:07

## 2025-07-10 RX ADMIN — FLUTICASONE PROPIONATE 100 MCG: 50 SPRAY, METERED NASAL at 10:07

## 2025-07-10 RX ADMIN — MUPIROCIN: 20 OINTMENT TOPICAL at 10:07

## 2025-07-10 RX ADMIN — IPRATROPIUM BROMIDE AND ALBUTEROL SULFATE 3 ML: .5; 3 SOLUTION RESPIRATORY (INHALATION) at 01:07

## 2025-07-10 RX ADMIN — IPRATROPIUM BROMIDE AND ALBUTEROL SULFATE 3 ML: .5; 3 SOLUTION RESPIRATORY (INHALATION) at 07:07

## 2025-07-10 RX ADMIN — PANTOPRAZOLE SODIUM 40 MG: 40 TABLET, DELAYED RELEASE ORAL at 09:07

## 2025-07-10 RX ADMIN — DOCUSATE SODIUM 100 MG: 100 CAPSULE, LIQUID FILLED ORAL at 09:07

## 2025-07-10 RX ADMIN — CEFEPIME 2 G: 2 INJECTION, POWDER, FOR SOLUTION INTRAVENOUS at 09:07

## 2025-07-10 RX ADMIN — ENOXAPARIN SODIUM 40 MG: 40 INJECTION SUBCUTANEOUS at 05:07

## 2025-07-10 RX ADMIN — METRONIDAZOLE 500 MG: 5 INJECTION, SOLUTION INTRAVENOUS at 06:07

## 2025-07-10 RX ADMIN — METRONIDAZOLE 500 MG: 5 INJECTION, SOLUTION INTRAVENOUS at 02:07

## 2025-07-10 RX ADMIN — DILTIAZEM HYDROCHLORIDE 120 MG: 120 CAPSULE, COATED, EXTENDED RELEASE ORAL at 09:07

## 2025-07-10 RX ADMIN — ASPIRIN 81 MG: 81 TABLET, COATED ORAL at 09:07

## 2025-07-10 RX ADMIN — MUPIROCIN: 20 OINTMENT TOPICAL at 09:07

## 2025-07-10 RX ADMIN — DOCUSATE SODIUM 100 MG: 100 CAPSULE, LIQUID FILLED ORAL at 10:07

## 2025-07-10 RX ADMIN — GUAIFENESIN 200 MG: 100 LIQUID ORAL at 10:07

## 2025-07-10 RX ADMIN — FLUTICASONE PROPIONATE 100 MCG: 50 SPRAY, METERED NASAL at 09:07

## 2025-07-10 NOTE — PLAN OF CARE
Problem: Adult Inpatient Plan of Care  Goal: Plan of Care Review  Outcome: Progressing  Goal: Patient-Specific Goal (Individualized)  Outcome: Progressing  Goal: Absence of Hospital-Acquired Illness or Injury  Outcome: Progressing  Goal: Optimal Comfort and Wellbeing  Outcome: Progressing  Goal: Readiness for Transition of Care  Outcome: Progressing     Problem: Skin Injury Risk Increased  Goal: Skin Health and Integrity  Outcome: Progressing     Problem: Fall Injury Risk  Goal: Absence of Fall and Fall-Related Injury  Outcome: Progressing     Problem: Pain Acute  Goal: Optimal Pain Control and Function  Outcome: Progressing     Problem: Infection  Goal: Absence of Infection Signs and Symptoms  Outcome: Progressing     Problem: Pneumonia  Goal: Fluid Balance  Outcome: Progressing  Goal: Resolution of Infection Signs and Symptoms  Outcome: Progressing  Goal: Effective Oxygenation and Ventilation  Outcome: Progressing     Problem: Pneumonia  Goal: Fluid Balance  Outcome: Progressing  Goal: Resolution of Infection Signs and Symptoms  Outcome: Progressing  Goal: Effective Oxygenation and Ventilation  Outcome: Progressing

## 2025-07-10 NOTE — PLAN OF CARE
Problem: Adult Inpatient Plan of Care  Goal: Plan of Care Review  Outcome: Progressing  Goal: Patient-Specific Goal (Individualized)  Outcome: Progressing  Goal: Absence of Hospital-Acquired Illness or Injury  Outcome: Progressing  Goal: Optimal Comfort and Wellbeing  Outcome: Progressing  Goal: Readiness for Transition of Care  Outcome: Progressing     Problem: Skin Injury Risk Increased  Goal: Skin Health and Integrity  Outcome: Progressing     Problem: Fall Injury Risk  Goal: Absence of Fall and Fall-Related Injury  Outcome: Progressing     Problem: Infection  Goal: Absence of Infection Signs and Symptoms  Outcome: Progressing     Problem: Pain Acute  Goal: Optimal Pain Control and Function  Outcome: Progressing     Problem: Pneumonia  Goal: Fluid Balance  Outcome: Progressing  Goal: Resolution of Infection Signs and Symptoms  Outcome: Progressing  Goal: Effective Oxygenation and Ventilation  Outcome: Progressing

## 2025-07-10 NOTE — PROGRESS NOTES
[facility] Wards Progress Note     Resident Team: [facility] List   Attending Physician: Laura Calles MD  Resident: Axel Davis MD; Dr. Mccord    Subjective:      Brief HPI:  nAuj Caldwell is a 39 y.o.  male with a history of Friedreich's ataxia, neurogenic bladder, and hypertension who presented on 7/7/2025  with c/o  dark urine and cough onset 1 week ago.  Mother states the patient's urine has been dark, with visible sediment.  Patient has a suprapubic catheter in place . He has been coughing significantly more.  Cough has produced a pale yellow sputum. .  There was an occasion about week ago where the patient did spit up some meat, which least 2 mother feeling some concern for aspiration.  He has not had any fevers at home, he denies any abdominal pain.  There has been no nausea, vomiting, or diarrhea.  Of note, patient had urine cultures positive for Pseudomonas and Klebsiella in March of 2024.     In the ED, initial pulse was 79, blood pressure 136/92, temperature 98.4° F, satting 94% on room air.  Oxygen began to desaturate to the low 90s he was placed on 2 L nasal cannula.  Patient was also treated with DuoNebs.  Saturations began to be in the mid to high 90s on 2 L. white count was unremarkable at 8.59.  H&H was normal.  Sodium 131, potassium 3.7, all other electrolytes within normal limits.  Lactic acid was found to be normal, as was the BNP.  His urine was turbid, had 2+ protein, 3+ glucose, 500 leuks esterase, and greater than 100 RBCs and white blood cells.  Many bacteria were also noted.  The ED did draw 2 sets of blood cultures and urine culture.  Says x-ray and chest CT were done and showed evidence of potential atypical infection bilaterally.  Family medicine inpatient team was consulted for admission.      Interval History:     NAEON. VSS. Diet advanced to soft and bite sized. He tolerated the EGD well, and it showed no acute pathology. Did not use IS overnight. Cough assist device is  "expected to be brought in today. Pt denies any pain or acute concerns at this moment.     Review of Sytems:  ROS completed and negative except as indicated above.     Objective:     Last 24 Hour Vital Signs:  BP  Min: 96/64  Max: 148/97  Temp  Av.3 °F (36.8 °C)  Min: 97.6 °F (36.4 °C)  Max: 98.8 °F (37.1 °C)  Pulse  Av  Min: 93  Max: 120  Resp  Av.5  Min: 14  Max: 22  SpO2  Av.5 %  Min: 93 %  Max: 99 %  Height  Av' 2" (157.5 cm)  Min: 5' 2" (157.5 cm)  Max: 5' 2" (157.5 cm)  Weight  Av.6 kg (191 lb)  Min: 86.6 kg (191 lb)  Max: 86.6 kg (191 lb)  I/O last 3 completed shifts:  In: 2304 [P.O.:120; I.V.:1600; IV Piggyback:584]  Out: 1500 [Urine:1500]    Physical Examination:  Physical Exam  Vitals reviewed.   Cardiovascular:      Rate and Rhythm: Normal rate and regular rhythm.      Heart sounds: No murmur heard.  Pulmonary:      Breath sounds: Wheezing and rhonchi present.      Comments: Improved respirations. Significantly decreased wheezing bilaterally   Abdominal:      General: There is distension.      Palpations: There is no mass.      Tenderness: There is no abdominal tenderness.      Comments: Suprapubic cath in place    Musculoskeletal:      Right lower leg: No edema.      Left lower leg: No edema.   Skin:     General: Skin is warm.      Coloration: Skin is not jaundiced.      Findings: No erythema or lesion.   Neurological:      Mental Status: He is alert.         Laboratory:  Most Recent Data:  CBC:   Lab Results   Component Value Date    WBC 7.58 07/10/2025    HGB 15.4 07/10/2025    HCT 44.8 07/10/2025     07/10/2025    MCV 84.7 07/10/2025    RDW 13.5 07/10/2025     WBC Differential:   Recent Labs   Lab 25  1427 25  0330 25  0319 07/10/25  0418   WBC 8.59 8.93 10.26 7.58   HGB 16.4 15.8 15.7 15.4   HCT 47.8 45.7 45.8 44.8    352 351 284   MCV 83.0 82.2 83.0 84.7     BMP:   Lab Results   Component Value Date     07/10/2025    K 3.8 07/10/2025 " "    07/10/2025    CO2 24 07/10/2025    BUN 11.1 07/10/2025    CREATININE 0.84 07/10/2025    GLU 87 07/10/2025    CALCIUM 8.5 07/10/2025    MG 2.40 07/31/2021    PHOS 3.3 07/26/2021     LFTs:   Lab Results   Component Value Date    PROT 6.7 07/10/2025    ALBUMIN 3.3 (L) 07/10/2025    BILITOT 0.6 07/10/2025    AST 20 07/10/2025    ALKPHOS 97 07/10/2025    ALT 20 07/10/2025     Coags:   Lab Results   Component Value Date    INR 1.0 05/28/2024    PROTIME 13.1 02/24/2021     FLP:   Lab Results   Component Value Date    CHOL 181 02/26/2021    HDL 41 02/26/2021    TRIG 199 (H) 02/26/2021     DM:   Lab Results   Component Value Date    HGBA1C 5.1 06/06/2023    CREATININE 0.84 07/10/2025     Thyroid:   Lab Results   Component Value Date    TSH 0.5214 07/30/2021      Anemia: No results found for: "IRON", "TIBC", "FERRITIN", "SATURATEDIRO"  No results found for: "[id number]"  No results found for: "FOLATE"     Cardiac:   Lab Results   Component Value Date    TROPONINI 0.26 (H) 06/13/2024    CKTOTAL 289 (H) 09/04/2015    CKMB 0.5 09/04/2015    BNP 32.0 07/07/2025         Microbiology Data:  Microbiology Results (last 7 days)       Procedure Component Value Units Date/Time    Blood culture #1 **CANNOT BE ORDERED STAT** [[id number]]  (Normal) Collected: 07/07/25 1411    Order Status: Completed Specimen: Blood from Antecubital, Right Updated: 07/09/25 1901     Blood Culture No Growth At 48 Hours    Blood culture #2 **CANNOT BE ORDERED STAT** [[id number]]  (Normal) Collected: 07/07/25 1427    Order Status: Completed Specimen: Blood from Antecubital, Left Updated: 07/09/25 1901     Blood Culture No Growth At 48 Hours    Urine culture [[id number]]  (Abnormal) Collected: 07/07/25 1429    Order Status: Completed Specimen: Urine, Supra Pubic Updated: 07/09/25 0719     Urine Culture >/= 100,000 colonies/ml Gram-negative Rods    Respiratory Culture [[id number]]     Order Status: Sent Specimen: Sputum              Other Results:  EKG "   Results for orders placed or performed during the hospital encounter of 01/17/17   EKG 12-lead    Collection Time: 01/17/17  2:24 PM    Narrative    Test Reason : I48.92  Blood Pressure : mmHG  Vent. Rate : 081 BPM     Atrial Rate : 081 BPM     P-R Int : 178 ms          QRS Dur : 080 ms      QT Int : 376 ms       P-R-T Axes : 052 077 024 degrees     QTc Int : 436 ms    Normal sinus rhythm  Normal ECG  When compared with ECG of 03-JUN-2016 10:32,  No significant change was found  Confirmed by Ernestina Noyola MD ([id number]) on 1/17/2017 4:10:31 PM    Referred By: ASHWIN NOYOLA           Confirmed By:Ernestina Noyola MD       Radiology:  Imaging Results              CT Chest Without Contrast (Final result)  Result time 07/07/25 15:58:18      Final result by Jessica Arauz MD (07/07/25 15:58:18)                   Impression:      Bilateral lower lobe pneumonia appears acute      Electronically signed by: Long Arauz  Date:    07/07/2025  Time:    15:58               Narrative:    EXAMINATION:  CT CHEST WITHOUT CONTRAST    CLINICAL HISTORY:  Aspiration;Respiratory illness, nondiagnostic xray;    TECHNIQUE:  Low dose axial images, sagittal and coronal reformations were obtained from the thoracic inlet to the lung bases. Contrast was not administered.  Automatic exposure control is utilized to reduce patient radiation exposure.    COMPARISON:  07/07/2025 chest x-ray    FINDINGS:  There is a bilateral lower lobe interstitial pneumonia.  This is slightly worse on the right.  There is developing consolidation in the right lower lobe.  No mass is seen.  No lesion is seen.  No pleural effusion is seen.  No pleural thickening is seen.  The mediastinum appears grossly unremarkable.  No abnormal lymphadenopathy is seen.  Thoracic aorta appears grossly unremarkable.  Heart appears normal.    Visualized portions of the upper abdomen show no acute abnormality.                                       X-Ray Chest 1 View (Final result)   Result time 07/07/25 15:02:55      Final result by Mejia Ordonez MD (07/07/25 15:02:55)                   Impression:      Findings suggestive of pulmonary edema or atypical infection      Electronically signed by: Mejia Ordonez MD  Date:    07/07/2025  Time:    15:02               Narrative:    EXAMINATION:  XR CHEST 1 VIEW    CLINICAL HISTORY:  Cough, unspecified    COMPARISON:  07/29/2021    FINDINGS:  Single view of the chest shows central vascular congestion with prominent interstitial opacities in the mid lower lungs.  No pneumothorax or large effusion.  Heart is upper normal in size.                                      Current Medications:     Infusions:   sodium chloride 0.9%  0-999 mL/hr Intravenous Continuous 999 mL/hr at 07/09/25 1500 999 mL/hr at 07/09/25 1500    0.9% NaCl   Intravenous Continuous 125 mL/hr at 07/10/25 0203 New Bag at 07/10/25 0203        Scheduled:   albuterol-ipratropium  3 mL Nebulization Q6H WAKE    aspirin  81 mg Oral Daily    azithromycin  500 mg Intravenous Daily    ceFEPime IV (PEDS and ADULTS)  2 g Intravenous Q8H    cetirizine  10 mg Oral Daily    cyclobenzaprine  10 mg Oral QHS    diltiaZEM  120 mg Oral Daily    docusate sodium  100 mg Oral BID    enoxparin  40 mg Subcutaneous Daily    fluticasone propionate  2 spray Each Nostril BID    folic acid  1 mg Oral Daily    losartan  50 mg Oral Daily    metoprolol succinate  100 mg Oral Daily    metroNIDAZOLE IV (PEDS and ADULTS)  500 mg Intravenous Q8H    mirtazapine  15 mg Oral QHS    mupirocin   Nasal BID    NON FORMULARY MEDICATION 1 tablet  1 tablet Oral TID    NON FORMULARY MEDICATION 2 mg  2 mg Oral QHS    NON FORMULARY MEDICATION 20 mg  20 mg Oral QAM    OXcarbazepine  600 mg Oral BID    pantoprazole  40 mg Oral Daily        PRN:    Current Facility-Administered Medications:     dextrose 50%, 12.5 g, Intravenous, PRN    dextrose 50%, 25 g, Intravenous, PRN    glucagon (human recombinant), 1 mg, Intramuscular, PRN     glucose, 16 g, Oral, PRN    glucose, 24 g, Oral, PRN    guaiFENesin 100 mg/5 ml, 200 mg, Oral, Q4H PRN    hydrOXYzine pamoate, 25 mg, Oral, BID PRN    naloxone, 0.02 mg, Intravenous, PRN    sodium chloride 0.9%, 10 mL, Intravenous, Q12H PRN      Assessment & Plan:     Urinary tract infection  - dark urine noted at home and on exam  -urinalysis showing turbid urine with blood, leuks esterase, white blood cells, and bacteria  - prior cultures grew Pseudomonas and Klebsiella  - Those organisms were sensitive to cefepime, Zosyn, meropenem, and tobramycin.  - on cefepime, day 3,  as patient does have a documented penicillin allergy  - urine cultures -> 100k GNR    Cough  - differential includes viral illness, COVID, atypical pneumonia  - chest x-ray and CT chest consistent with this   - Chest CT showed bilateral lower lobe infiltrates  - patient already on cefepime, which has good strep and staph coverage (though no MRSA)  - azithro day 3  - flagyl day 3 for possible aspiration   - duonebs q6hr while awake  - consider steroids  - sputum cultures to be collected  - MRSA negative, covid flu rsv negative, resp panel neg  - blood cultures neg at 48hr  - Given pt's Friedrecich's Ataxia, pt is unable to consistently clear his secretions w coughing  - Reached out to [facility] for cough assist device      Friedreich's ataxia  - chronic condition, has multiple psych medications at home  - home psych meds provided by family (Rexulti, Trintellix)  - subacute catheter is in place, will need repeated after this urine infection resolved is treated  - continue his home folic acid, docusate, cyclobenzaprine, and Zyrtec  - Flonase also been available  - Speech recommended liquid diet on 7/8 and GI consult  - Food bolus noted during barium swallow   - GI performed EGD 7/9 with no significant findings ; Diet advanced to minced and moist IDDSI Level V      Hypertension  - well controlled   - patient taking diltiazem 120 mg, and losartan 50 mg,  and metoprolol succinate 100 mg  -restart patient's home blood pressure medications       CODE STATUS: Full Code   Diet: minced and moist, IDDSI Level V  DVT Prophylaxis:   Anticoagulants   Medication Route Frequency    enoxaparin injection 40 mg Subcutaneous Daily         Axel Davis MD  [employer] [facility], PGY-3

## 2025-07-11 LAB
ANION GAP SERPL CALC-SCNC: 9 MEQ/L
BUN SERPL-MCNC: 7.8 MG/DL (ref 8.9–20.6)
CALCIUM SERPL-MCNC: 9 MG/DL (ref 8.4–10.2)
CHLORIDE SERPL-SCNC: 103 MMOL/L (ref 98–107)
CO2 SERPL-SCNC: 23 MMOL/L (ref 22–29)
CREAT SERPL-MCNC: 0.78 MG/DL (ref 0.72–1.25)
CREAT/UREA NIT SERPL: 10
GFR SERPLBLD CREATININE-BSD FMLA CKD-EPI: >60 ML/MIN/1.73/M2
GLUCOSE SERPL-MCNC: 125 MG/DL (ref 74–100)
HOLD SPECIMEN: NORMAL
MAGNESIUM SERPL-MCNC: 1.9 MG/DL (ref 1.6–2.6)
OHS QRS DURATION: 72 MS
OHS QTC CALCULATION: 448 MS
POTASSIUM SERPL-SCNC: 3.7 MMOL/L (ref 3.5–5.1)
SODIUM SERPL-SCNC: 135 MMOL/L (ref 136–145)

## 2025-07-11 PROCEDURE — 25000003 PHARM REV CODE 250

## 2025-07-11 PROCEDURE — 80048 BASIC METABOLIC PNL TOTAL CA: CPT | Performed by: FAMILY MEDICINE

## 2025-07-11 PROCEDURE — 27000173 HC ACAPELLA DEVICE DH OR DM

## 2025-07-11 PROCEDURE — 94668 MNPJ CHEST WALL SBSQ: CPT

## 2025-07-11 PROCEDURE — 83735 ASSAY OF MAGNESIUM: CPT | Performed by: FAMILY MEDICINE

## 2025-07-11 PROCEDURE — 27000221 HC OXYGEN, UP TO 24 HOURS

## 2025-07-11 PROCEDURE — 94664 DEMO&/EVAL PT USE INHALER: CPT

## 2025-07-11 PROCEDURE — 93005 ELECTROCARDIOGRAM TRACING: CPT

## 2025-07-11 PROCEDURE — 94761 N-INVAS EAR/PLS OXIMETRY MLT: CPT

## 2025-07-11 PROCEDURE — 63600175 PHARM REV CODE 636 W HCPCS

## 2025-07-11 PROCEDURE — 97162 PT EVAL MOD COMPLEX 30 MIN: CPT

## 2025-07-11 PROCEDURE — 94640 AIRWAY INHALATION TREATMENT: CPT

## 2025-07-11 PROCEDURE — 25000003 PHARM REV CODE 250: Performed by: FAMILY MEDICINE

## 2025-07-11 PROCEDURE — 21400001 HC TELEMETRY ROOM

## 2025-07-11 PROCEDURE — 25000242 PHARM REV CODE 250 ALT 637 W/ HCPCS

## 2025-07-11 PROCEDURE — 36415 COLL VENOUS BLD VENIPUNCTURE: CPT | Performed by: FAMILY MEDICINE

## 2025-07-11 PROCEDURE — 97166 OT EVAL MOD COMPLEX 45 MIN: CPT

## 2025-07-11 PROCEDURE — 99900035 HC TECH TIME PER 15 MIN (STAT)

## 2025-07-11 RX ORDER — ACETAMINOPHEN 325 MG/1
650 TABLET ORAL EVERY 6 HOURS PRN
Status: DISCONTINUED | OUTPATIENT
Start: 2025-07-11 | End: 2025-07-14

## 2025-07-11 RX ADMIN — METRONIDAZOLE 500 MG: 5 INJECTION, SOLUTION INTRAVENOUS at 11:07

## 2025-07-11 RX ADMIN — FLUTICASONE PROPIONATE 100 MCG: 50 SPRAY, METERED NASAL at 09:07

## 2025-07-11 RX ADMIN — CEFTRIAXONE SODIUM 1 G: 1 INJECTION, POWDER, FOR SOLUTION INTRAMUSCULAR; INTRAVENOUS at 11:07

## 2025-07-11 RX ADMIN — MUPIROCIN: 20 OINTMENT TOPICAL at 09:07

## 2025-07-11 RX ADMIN — IPRATROPIUM BROMIDE AND ALBUTEROL SULFATE 3 ML: .5; 3 SOLUTION RESPIRATORY (INHALATION) at 08:07

## 2025-07-11 RX ADMIN — ENOXAPARIN SODIUM 40 MG: 40 INJECTION SUBCUTANEOUS at 05:07

## 2025-07-11 RX ADMIN — OXCARBAZEPINE 600 MG: 150 TABLET, FILM COATED ORAL at 10:07

## 2025-07-11 RX ADMIN — METOPROLOL SUCCINATE 100 MG: 50 TABLET, EXTENDED RELEASE ORAL at 09:07

## 2025-07-11 RX ADMIN — CYCLOBENZAPRINE 10 MG: 10 TABLET, FILM COATED ORAL at 09:07

## 2025-07-11 RX ADMIN — DILTIAZEM HYDROCHLORIDE 120 MG: 120 CAPSULE, COATED, EXTENDED RELEASE ORAL at 09:07

## 2025-07-11 RX ADMIN — LOSARTAN POTASSIUM 50 MG: 25 TABLET, FILM COATED ORAL at 09:07

## 2025-07-11 RX ADMIN — PANTOPRAZOLE SODIUM 40 MG: 40 TABLET, DELAYED RELEASE ORAL at 09:07

## 2025-07-11 RX ADMIN — ACETAMINOPHEN 650 MG: 325 TABLET ORAL at 12:07

## 2025-07-11 RX ADMIN — AZITHROMYCIN MONOHYDRATE 500 MG: 500 INJECTION, POWDER, LYOPHILIZED, FOR SOLUTION INTRAVENOUS at 09:07

## 2025-07-11 RX ADMIN — IPRATROPIUM BROMIDE AND ALBUTEROL SULFATE 3 ML: .5; 3 SOLUTION RESPIRATORY (INHALATION) at 01:07

## 2025-07-11 RX ADMIN — METRONIDAZOLE 500 MG: 5 INJECTION, SOLUTION INTRAVENOUS at 04:07

## 2025-07-11 RX ADMIN — OXCARBAZEPINE 600 MG: 150 TABLET, FILM COATED ORAL at 09:07

## 2025-07-11 RX ADMIN — ASPIRIN 81 MG: 81 TABLET, COATED ORAL at 09:07

## 2025-07-11 RX ADMIN — SODIUM CHLORIDE: 9 INJECTION, SOLUTION INTRAVENOUS at 08:07

## 2025-07-11 RX ADMIN — MIRTAZAPINE 15 MG: 7.5 TABLET, FILM COATED ORAL at 09:07

## 2025-07-11 RX ADMIN — CETIRIZINE HYDROCHLORIDE 10 MG: 10 TABLET, FILM COATED ORAL at 09:07

## 2025-07-11 RX ADMIN — DOCUSATE SODIUM 100 MG: 100 CAPSULE, LIQUID FILLED ORAL at 09:07

## 2025-07-11 RX ADMIN — FOLIC ACID 1 MG: 1 TABLET ORAL at 09:07

## 2025-07-11 RX ADMIN — METRONIDAZOLE 500 MG: 5 INJECTION, SOLUTION INTRAVENOUS at 06:07

## 2025-07-11 NOTE — PT/OT/SLP EVAL
Physical Therapy Evaluation & Discharge Note    Patient Name:  Anuj Caldwell   MRN:  [id number]    Recommendations     Therapy Intensity Recommendations at Discharge: No Therapy Indicated  Discharge Equipment Recommendations: none   Equipment to be obtained for discharge: none.  Barriers to discharge: none    Assessment     Anuj Caldwell is a 39 y.o. male admitted with a medical diagnosis of Acute cystitis with hematuria.  1. Pneumonia of both lower lobes due to infectious organism    2. Cough    3. Hypoxia    4. Complicated UTI (urinary tract infection)    5. Chest pain    6. Gastroesophageal reflux disease, unspecified whether esophagitis present    7. Friedreich's ataxia    8. Acute cystitis with hematuria    9. Neurogenic bladder    10. Bilateral pulmonary infiltrates    11. Right upper quadrant abdominal pain         Patient's current level of function is at baseline (PLOF).  Patient does not require further acute PT services.     Recent Surgery: Procedure(s) (LRB):  EGD (ESOPHAGOGASTRODUODENOSCOPY) (Left) 2 Days Post-Op    Plan     Patient discharged from acute PT Services on 07/11/25.    Based on current functional levels observed, patient does not require further acute PT services.    Re-consult PT Services if patient's status changes and therapy services warranted.    Subjective     Communicated with patient's nurse Rosie prior to session.    Patient agreeable to participate in evaluation.     Chief Complaint: Back discomfort from wedge and fatigue  Patient/Family Comments/goals: go home soon  Pain/Comfort:  Pain Rating 1: 4/10  Location 1: back  Pain Addressed 1: Reposition  Pain Rating Post-Intervention 1: 2/10    Patients cultural, spiritual, Congregation conflicts given the current situation: no    Social History  Living Environment: Patient lives with mother in a single level home.  Functional Level: Prior to admission patient required assistance with ADLs including mobility (bed-transfer-ambulation),  eating, dressing, bathing, and toileting, was non-ambulatory, and was bedbound.  Equipment Used at Home: hospital bed, wheelchair, lift device, shower chair  Equipment owned (not currently used): none.  Assistance Upon Discharge: family.    Objective     Patient found with HOB elevated with peripheral IV, SCD, oxygen, suprapubic catheter  upon PT entry to room.    General Precautions: Standard, fall, hearing impaired, pureed diet   Orthopedic Precautions:N/A   Braces:  N/A  Respiratory Status: 2 liters/min O2 via nasal cannula    No dizziness or discomfort during session    Exams:  Orientation: Patient is oriented to person, place, time, situation  Commands: Patient follows 1-step verbal commands  Hypotonic globally   RLE ROM: Deficits: drop foot noted at rest, able to obtain neutral with PROM  RLE Strength: Deficits: 1 to 2-/5 globally   LLE ROM: Deficits: drop foot noted at rest, able to obtain neutral with PROM  LLE Strength: Deficits: 1 to 2-/5 globally    Functional Mobility:    Bed Mobility:  Rolling Left: total assistance and of 2 persons  Rolling Right: total assistance and of 2 persons  Supine to Sit: total assistance and of 2 persons  Sit to Supine: total assistance and of 2 persons    Transfers:  Dependent and needs lift.    Other Mobility:  N/A    Balance:  Sit  Static: 0: Needs MAXIMAL assist to maintain sitting without back support  Dynamic: 0: N/A    Additional Treatment Session  N/A    Patient left with HOB elevated with all lines intact, call button in reach, tray table at bedside, and patient's nurse notified.  - soft touch call bell in use    DME Justifications:  No DME recommended requiring DME justifications    Education     Patient educated on the importance of early mobility to prevent functional decline during hospital stay.  Patient educated on and assisted with functional mobility as noted above.  Patient educated on PT Plan of Care and role of PT in acute care.  Patient was instructed to  utilize staff assistance for mobility/transfers.  White board updated regarding patient's safest level of mobility with staff assistance    Goals - No STG's or LTG's established secondary to patient was seen for evaluation and discharge     Multidisciplinary Problems       Physical Therapy Goals       Not on file                    History     Past Medical History:   Diagnosis Date    Primary hypertension 6/6/2023    Seizures 2007     Past Surgical History:   Procedure Laterality Date    ESOPHAGOGASTRODUODENOSCOPY Left 7/9/2025    Procedure: EGD (ESOPHAGOGASTRODUODENOSCOPY);  Surgeon: Ladi Armando MD;  Location: [facility] ENDOSCOPY;  Service: Gastroenterology;  Laterality: Left;    WISDOM TOOTH EXTRACTION       Time Tracking     PT Received On: 07/11/25  PT Start Time: 0807     PT Stop Time: 0837  PT Total Time (min): 30 min     Billable Minutes: Evaluation 30; moderate     07/11/2025

## 2025-07-11 NOTE — PROGRESS NOTES
[facility] Progress Note     Resident Team: [facility]   Attending Physician: Laura Calles MD  Resident: Axel Davis MD; Dr. Mccord    Subjective:      Brief HPI:  Anuj Caldwell is a 39 y.o.  male with a history of Friedreich's ataxia, neurogenic bladder, and hypertension who presented on 7/7/2025  with c/o  dark urine and cough onset 1 week ago.  Mother states the patient's urine has been dark, with visible sediment.  Patient has a suprapubic catheter in place . He has been coughing significantly more.  Cough has produced a pale yellow sputum. .  There was an occasion about week ago where the patient did spit up some meat, which least 2 mother feeling some concern for aspiration.  He has not had any fevers at home, he denies any abdominal pain.  There has been no nausea, vomiting, or diarrhea.  Of note, patient had urine cultures positive for Pseudomonas and Klebsiella in March of 2024.     In the ED, initial pulse was 79, blood pressure 136/92, temperature 98.4° F, satting 94% on room air.  Oxygen began to desaturate to the low 90s he was placed on 2 L nasal cannula.  Patient was also treated with DuoNebs.  Saturations began to be in the mid to high 90s on 2 L. white count was unremarkable at 8.59.  H&H was normal.  Sodium 131, potassium 3.7, all other electrolytes within normal limits.  Lactic acid was found to be normal, as was the BNP.  His urine was turbid, had 2+ protein, 3+ glucose, 500 leuks esterase, and greater than 100 RBCs and white blood cells.  Many bacteria were also noted.  The ED did draw 2 sets of blood cultures and urine culture.  Says x-ray and chest CT were done and showed evidence of potential atypical infection bilaterally.  Family medicine inpatient team was consulted for admission.      Interval History:     Asymptomatic arrhythmia noted on rhythm strips. VSS. Diet   soft and bite sized. Using IS and working with PT today  Cough assist device will be delivered at  home  . Pt denies any pain or acute concerns at this moment.     Review of Sytems:  ROS completed and negative except as indicated above.     Objective:     Last 24 Hour Vital Signs:  BP  Min: 116/86  Max: 139/83  Temp  Av.1 °F (36.7 °C)  Min: 97.3 °F (36.3 °C)  Max: 98.6 °F (37 °C)  Pulse  Av.9  Min: 86  Max: 103  Resp  Av.7  Min: 16  Max: 18  SpO2  Av.1 %  Min: 91 %  Max: 96 %  I/O last 3 completed shifts:  In: 6372.4 [P.O.:1440; I.V.:3817.8; IV Piggyback:1114.6]  Out: 3400 [Urine:3400]    Physical Examination:  Physical Exam  Vitals reviewed.   Cardiovascular:      Rate and Rhythm: Normal rate and regular rhythm.      Heart sounds: No murmur heard.  Pulmonary:      Breath sounds: Wheezing and rhonchi present.      Comments: Minimally course BS   Abdominal:      Palpations: There is no mass.      Tenderness: There is no abdominal tenderness.      Comments: Suprapubic cath in place    Musculoskeletal:      Right lower leg: No edema.      Left lower leg: No edema.   Skin:     General: Skin is warm.      Coloration: Skin is not jaundiced.      Findings: No erythema or lesion.   Neurological:      Mental Status: He is alert.         Laboratory:  Most Recent Data:  CBC:   Lab Results   Component Value Date    WBC 7.58 07/10/2025    HGB 15.4 07/10/2025    HCT 44.8 07/10/2025     07/10/2025    MCV 84.7 07/10/2025    RDW 13.5 07/10/2025     WBC Differential:   Recent Labs   Lab 25  1427 25  0330 25  0319 07/10/25  0418   WBC 8.59 8.93 10.26 7.58   HGB 16.4 15.8 15.7 15.4   HCT 47.8 45.7 45.8 44.8    352 351 284   MCV 83.0 82.2 83.0 84.7     BMP:   Lab Results   Component Value Date     07/10/2025    K 3.8 07/10/2025     07/10/2025    CO2 24 07/10/2025    BUN 11.1 07/10/2025    CREATININE 0.84 07/10/2025    GLU 87 07/10/2025    CALCIUM 8.5 07/10/2025    MG 2.40 2021    PHOS 3.3 2021     LFTs:   Lab Results   Component Value Date    PROT 6.7  "07/10/2025    ALBUMIN 3.3 (L) 07/10/2025    BILITOT 0.6 07/10/2025    AST 20 07/10/2025    ALKPHOS 97 07/10/2025    ALT 20 07/10/2025     Coags:   Lab Results   Component Value Date    INR 1.0 05/28/2024    PROTIME 13.1 02/24/2021     FLP:   Lab Results   Component Value Date    CHOL 181 02/26/2021    HDL 41 02/26/2021    TRIG 199 (H) 02/26/2021     DM:   Lab Results   Component Value Date    HGBA1C 5.1 06/06/2023    CREATININE 0.84 07/10/2025     Thyroid:   Lab Results   Component Value Date    TSH 0.5214 07/30/2021      Anemia: No results found for: "IRON", "TIBC", "FERRITIN", "SATURATEDIRO"  No results found for: "KBGYCSRC23"  No results found for: "FOLATE"     Cardiac:   Lab Results   Component Value Date    TROPONINI 0.26 (H) 06/13/2024    CKTOTAL 289 (H) 09/04/2015    CKMB 0.5 09/04/2015    BNP 32.0 07/07/2025         Microbiology Data:  Microbiology Results (last 7 days)       Procedure Component Value Units Date/Time    Blood culture #1 **CANNOT BE ORDERED STAT** [[id number]]  (Normal) Collected: 07/07/25 1411    Order Status: Completed Specimen: Blood from Antecubital, Right Updated: 07/10/25 1901     Blood Culture No Growth At 72 Hours    Blood culture #2 **CANNOT BE ORDERED STAT** [[id number]]  (Normal) Collected: 07/07/25 1427    Order Status: Completed Specimen: Blood from Antecubital, Left Updated: 07/10/25 1901     Blood Culture No Growth At 72 Hours    Urine culture [[id number]]  (Abnormal)  (Susceptibility) Collected: 07/07/25 1429    Order Status: Completed Specimen: Urine, Supra Pubic Updated: 07/10/25 1012     Urine Culture >/= 100,000 colonies/ml Klebsiella oxytoca    Respiratory Culture [[id number]]     Order Status: Sent Specimen: Sputum              Other Results:  EKG   Results for orders placed or performed during the hospital encounter of 01/17/17   EKG 12-lead    Collection Time: 01/17/17  2:24 PM    Narrative    Test Reason : I48.92  Blood Pressure : mmHG  Vent. Rate : 081 BPM     Atrial " Rate : 081 BPM     P-R Int : 178 ms          QRS Dur : 080 ms      QT Int : 376 ms       P-R-T Axes : 052 077 024 degrees     QTc Int : 436 ms    Normal sinus rhythm  Normal ECG  When compared with ECG of 03-JUN-2016 10:32,  No significant change was found  Confirmed by Ernestina Noyola MD ([id number]) on 1/17/2017 4:10:31 PM    Referred By: ASHWIN NOYOLA           Confirmed By:Ernestina Noyola MD       Radiology:  Imaging Results              CT Chest Without Contrast (Final result)  Result time 07/07/25 15:58:18      Final result by Jessica Arauz MD (07/07/25 15:58:18)                   Impression:      Bilateral lower lobe pneumonia appears acute      Electronically signed by: Long Arauz  Date:    07/07/2025  Time:    15:58               Narrative:    EXAMINATION:  CT CHEST WITHOUT CONTRAST    CLINICAL HISTORY:  Aspiration;Respiratory illness, nondiagnostic xray;    TECHNIQUE:  Low dose axial images, sagittal and coronal reformations were obtained from the thoracic inlet to the lung bases. Contrast was not administered.  Automatic exposure control is utilized to reduce patient radiation exposure.    COMPARISON:  07/07/2025 chest x-ray    FINDINGS:  There is a bilateral lower lobe interstitial pneumonia.  This is slightly worse on the right.  There is developing consolidation in the right lower lobe.  No mass is seen.  No lesion is seen.  No pleural effusion is seen.  No pleural thickening is seen.  The mediastinum appears grossly unremarkable.  No abnormal lymphadenopathy is seen.  Thoracic aorta appears grossly unremarkable.  Heart appears normal.    Visualized portions of the upper abdomen show no acute abnormality.                                       X-Ray Chest 1 View (Final result)  Result time 07/07/25 15:02:55      Final result by Mejia Ordonez MD (07/07/25 15:02:55)                   Impression:      Findings suggestive of pulmonary edema or atypical infection      Electronically signed by: Mejia  MD José Luis  Date:    07/07/2025  Time:    15:02               Narrative:    EXAMINATION:  XR CHEST 1 VIEW    CLINICAL HISTORY:  Cough, unspecified    COMPARISON:  07/29/2021    FINDINGS:  Single view of the chest shows central vascular congestion with prominent interstitial opacities in the mid lower lungs.  No pneumothorax or large effusion.  Heart is upper normal in size.                                      Current Medications:     Infusions:         Scheduled:   albuterol-ipratropium  3 mL Nebulization Q6H WAKE    aspirin  81 mg Oral Daily    cefTRIAXone (Rocephin) IV (PEDS and ADULTS)  1 g Intravenous Q24H    cetirizine  10 mg Oral Daily    cyclobenzaprine  10 mg Oral QHS    diltiaZEM  120 mg Oral Daily    docusate sodium  100 mg Oral BID    enoxparin  40 mg Subcutaneous Daily    fluticasone propionate  2 spray Each Nostril BID    folic acid  1 mg Oral Daily    losartan  50 mg Oral Daily    metoprolol succinate  100 mg Oral Daily    metroNIDAZOLE IV (PEDS and ADULTS)  500 mg Intravenous Q8H    mirtazapine  15 mg Oral QHS    mupirocin   Nasal BID    NON FORMULARY MEDICATION 1 tablet  1 tablet Oral TID    NON FORMULARY MEDICATION 2 mg  2 mg Oral QHS    NON FORMULARY MEDICATION 20 mg  20 mg Oral QAM    OXcarbazepine  600 mg Oral BID    pantoprazole  40 mg Oral Daily        PRN:    Current Facility-Administered Medications:     dextrose 50%, 12.5 g, Intravenous, PRN    dextrose 50%, 25 g, Intravenous, PRN    glucagon (human recombinant), 1 mg, Intramuscular, PRN    glucose, 16 g, Oral, PRN    glucose, 24 g, Oral, PRN    guaiFENesin 100 mg/5 ml, 200 mg, Oral, Q4H PRN    hydrOXYzine pamoate, 25 mg, Oral, BID PRN    naloxone, 0.02 mg, Intravenous, PRN    sodium chloride 0.9%, 10 mL, Intravenous, Q12H PRN      Assessment & Plan:     Urinary tract infection 2/2 Klebsiella oxytoca  - dark urine noted at home and on exam  -urinalysis showing turbid urine with blood, leuks esterase, white blood cells, and bacteria  -  culture grew 100k Klebsiella oxytoca  - Initially placed on Cefepime, but this was switched to rocephin on 7/11/25 given current sensitivities       Cough w bilateral lower lobe infiltrates   - initial differential included viral illness, pneumonia , COVID, atypical pneumonia  - Chest CT showed bilateral lower lobe infiltrates  - respiratory panel negative   - azithro discontinued  ; completed 3 day course   - flagyl day 4 for possible aspiration  - aspiration lower on differential given normal barium swallow   - duonebs q6hr while awake  - no need for steroids given pt's improvement  - sputum cultures are still waiting to be collected  - blood cultures negative  - Given pt's Friedreich's Ataxia, pt is unable to consistently clear his secretions w coughing  - Reached out to [employer] for cough assist device  ; [employer] will deliver to pt's home at discharge     Friedreich's ataxia  - chronic condition, has multiple psych medications at home  - home psych meds provided by family (Rexulti, Trintellix)  - subacute catheter is in place, will need repeated after this urine infection resolved is treated  - continue his home folic acid, docusate, cyclobenzaprine, and Zyrtec  - Flonase also been available  - Speech recommended liquid diet on 7/8 and GI consult  - Food bolus noted during barium swallow   - GI performed EGD 7/9 with no significant findings ; Diet advanced to minced and moist IDDSI Level V      Hypertension  Arrhythmia   - well controlled   - patient taking diltiazem 120 mg, and losartan 50 mg, and metoprolol succinate 100 mg  - restart patient's home blood pressure medications as listed above  - rhythm strip  at 0342 showed findings concerning for heart block given p waves w dropped QRS complexes.    - EKG this morning did not reflect this.   - EKG showed sinus tach with nonspecific t waves        CODE STATUS: Full Code   Diet: minced and moist, IDDSI Level V  DVT Prophylaxis:   Anticoagulants   Medication Route  Frequency    enoxaparin injection 40 mg Subcutaneous Daily         Axel Davis MD  [facility], PGY-3

## 2025-07-11 NOTE — PT/OT/SLP EVAL
"Occupational Therapy   Evaluation and Discharge Note    Name: Anuj Caldwell  MRN: [id number]  Admitting Diagnosis: Acute cystitis with hematuria  Recent Surgery: Procedure(s) (LRB):  EGD (ESOPHAGOGASTRODUODENOSCOPY) (Left) 2 Days Post-Op    Recommendations:     Discharge Recommendations: No Therapy Indicated  Discharge Equipment Recommendations: none  Barriers to discharge:  None    Assessment:     Anuj Caldwell is a 39 y.o. male with a medical diagnosis of Acute cystitis with hematuria. At this time, patient is functioning at their prior level of function and does not require further acute OT services.     Plan:     During this hospitalization, patient does not require further acute OT services.  Please re-consult if situation changes.    Plan of Care Reviewed with: patient    Subjective     Chief Complaint: none at this time, mild discomfort low back "from the wedge"  Patient/Family Comments/goals: DC home when medically stable    Occupational Profile:  Living Environment: accessible home with his mother, also lives with his father and younger sister who are both disabled, uses lift device to transfer to  for MD appts or to shower chair for bathing  Previous level of function: assist with all ADLs, bedbound/Wcbound, suprapubic catheter  Roles and Routines: pt has an LPN who is his caregiver, 54 hrs/ wk, and his mother provides all other assist as needed  Equipment Used at home: hospital bed, shower chair, lift device, wheelchair  Assistance upon Discharge: pt's mother and caregiver    Pain/Comfort:  Pain Rating 1: 4/10  Location - Orientation 1: lower  Location 1: back  Pain Addressed 1: Reposition  Pain Rating Post-Intervention 1: 2/10    Patients cultural, spiritual, Zoroastrianism conflicts given the current situation: no    Objective:     Communicated with: nurse Walker prior to session.  Patient found L sidelying, HOB elevated with peripheral IV, SCD, oxygen, srinivasan catheter upon OT entry to " room.    General Precautions: Standard, fall, hearing impaired, pureed diet  Orthopedic Precautions: N/A  Braces: N/A  Respiratory Status: Nasal cannula, flow 2 L/min     Occupational Performance:    Bed Mobility:    Patient completed Supine to Sit with max/total assist x2  Patient completed Sit to Supine with max/total assist x2    Functional Mobility/Transfers:  Functional Mobility: unable to assess; pt unable to stand    Activities of Daily Living:  Feeding:  maximal assistance .  Grooming: maximal assistance .  Upper Body Dressing: maximal assistance .  Lower Body Dressing: total assistance .  Toileting: dependence .    Cognitive/Visual Perceptual:  Cognitive/Psychosocial Skills:     -       Oriented to: Person, Place, Time, and Situation   -       Follows Commands/attention:Follows multistep  commands  -       Safety awareness/insight to disability: intact   -       Mood/Affect/Coping skills/emotional control: Cooperative, Pleasant, and hard of hearing, speech delayed d/t dysarthria/ataxia    Physical Exam:  Ataxia limiting BUE functionality, but pt able to follow commands to reach o/h to WFL with BUE AROM and to squeeze with B hands for  strength 4/5 bilaterally.  FM and GM coordination significantly impaired.    Treatment & Education:  Pt. educated on POC, orientation to environment, use of call bell for assist with transfers OOB or for any other needs due to fall risk.  Pt verbalized understanding.    Patient left HOB elevated with all lines intact, call button in reach, and nurse notified    GOALS:   Multidisciplinary Problems       Occupational Therapy Goals       Not on file                    DME Justifications:  No DME recommended requiring DME justifications    History:     Past Medical History:   Diagnosis Date    Primary hypertension 6/6/2023    Seizures 2007         Past Surgical History:   Procedure Laterality Date    ESOPHAGOGASTRODUODENOSCOPY Left 7/9/2025    Procedure: EGD  (ESOPHAGOGASTRODUODENOSCOPY);  Surgeon: Ladi Armando MD;  Location: [facility] ENDOSCOPY;  Service: Gastroenterology;  Laterality: Left;    WISDOM TOOTH EXTRACTION         Time Tracking:     OT Date of Treatment: 07/11/25  OT Start Time: 0807  OT Stop Time: 0838  OT Total Time (min): 31 min    Billable Minutes:Evaluation 31, moderate    7/11/2025

## 2025-07-11 NOTE — CARE UPDATE
Transition of Care Progress Note      Dr. Burgess and I have received checkout from Dr. Davis regarding this patient.     HO1 assessment:  Admission diagnosis: Cough [R05.9]  Hypoxia [R09.02]  Chest pain [R07.9]  Complicated UTI (urinary tract infection) [N39.0]  Pneumonia of both lower lobes due to infectious organism [J18.9]       Overnight management:   Monitor vitals. PM meds as scheduled. Cefepime q8h x 14 d (4/14). DuoNeb on board. Diet minced & moist. Encourage IS.     Code status:   Full    Please call [phone] from 07/10/2025 4PM to 07/11/2025 7AM if any issues arise.    Jovany Ruiz MD  [facility], [facility], HO-1  07/11/2025

## 2025-07-12 LAB
ALBUMIN SERPL-MCNC: 3.5 G/DL (ref 3.5–5)
ALBUMIN/GLOB SERPL: 1 RATIO (ref 1.1–2)
ALP SERPL-CCNC: 100 UNIT/L (ref 40–150)
ALT SERPL-CCNC: 65 UNIT/L (ref 0–55)
ANION GAP SERPL CALC-SCNC: 7 MEQ/L
AST SERPL-CCNC: 69 UNIT/L (ref 11–45)
BACTERIA BLD CULT: NORMAL
BACTERIA BLD CULT: NORMAL
BASOPHILS # BLD AUTO: 0.07 X10(3)/MCL
BASOPHILS NFR BLD AUTO: 1 %
BILIRUB SERPL-MCNC: 0.4 MG/DL
BUN SERPL-MCNC: 4.9 MG/DL (ref 8.9–20.6)
CALCIUM SERPL-MCNC: 8.9 MG/DL (ref 8.4–10.2)
CHLORIDE SERPL-SCNC: 100 MMOL/L (ref 98–107)
CO2 SERPL-SCNC: 26 MMOL/L (ref 22–29)
CREAT SERPL-MCNC: 0.76 MG/DL (ref 0.72–1.25)
CREAT/UREA NIT SERPL: 6
EOSINOPHIL # BLD AUTO: 0.67 X10(3)/MCL (ref 0–0.9)
EOSINOPHIL NFR BLD AUTO: 9.5 %
ERYTHROCYTE [DISTWIDTH] IN BLOOD BY AUTOMATED COUNT: 13.2 % (ref 11.5–17)
GFR SERPLBLD CREATININE-BSD FMLA CKD-EPI: >60 ML/MIN/1.73/M2
GLOBULIN SER-MCNC: 3.4 GM/DL (ref 2.4–3.5)
GLUCOSE SERPL-MCNC: 96 MG/DL (ref 74–100)
HCT VFR BLD AUTO: 44.6 % (ref 42–52)
HGB BLD-MCNC: 15.5 G/DL (ref 14–18)
HOLD SPECIMEN: NORMAL
IMM GRANULOCYTES # BLD AUTO: 0.04 X10(3)/MCL (ref 0–0.04)
IMM GRANULOCYTES NFR BLD AUTO: 0.6 %
LYMPHOCYTES # BLD AUTO: 2.25 X10(3)/MCL (ref 0.6–4.6)
LYMPHOCYTES NFR BLD AUTO: 32.1 %
MCH RBC QN AUTO: 29.1 PG (ref 27–31)
MCHC RBC AUTO-ENTMCNC: 34.8 G/DL (ref 33–36)
MCV RBC AUTO: 83.7 FL (ref 80–94)
MONOCYTES # BLD AUTO: 0.6 X10(3)/MCL (ref 0.1–1.3)
MONOCYTES NFR BLD AUTO: 8.5 %
NEUTROPHILS # BLD AUTO: 3.39 X10(3)/MCL (ref 2.1–9.2)
NEUTROPHILS NFR BLD AUTO: 48.3 %
NRBC BLD AUTO-RTO: 0 %
PLATELET # BLD AUTO: 287 X10(3)/MCL (ref 130–400)
PMV BLD AUTO: 8.7 FL (ref 7.4–10.4)
POTASSIUM SERPL-SCNC: 4.1 MMOL/L (ref 3.5–5.1)
PROT SERPL-MCNC: 6.9 GM/DL (ref 6.4–8.3)
RBC # BLD AUTO: 5.33 X10(6)/MCL (ref 4.7–6.1)
SODIUM SERPL-SCNC: 133 MMOL/L (ref 136–145)
WBC # BLD AUTO: 7.02 X10(3)/MCL (ref 4.5–11.5)

## 2025-07-12 PROCEDURE — 25000242 PHARM REV CODE 250 ALT 637 W/ HCPCS

## 2025-07-12 PROCEDURE — 94668 MNPJ CHEST WALL SBSQ: CPT

## 2025-07-12 PROCEDURE — 63600175 PHARM REV CODE 636 W HCPCS

## 2025-07-12 PROCEDURE — 25000003 PHARM REV CODE 250

## 2025-07-12 PROCEDURE — 94761 N-INVAS EAR/PLS OXIMETRY MLT: CPT

## 2025-07-12 PROCEDURE — 99900035 HC TECH TIME PER 15 MIN (STAT)

## 2025-07-12 PROCEDURE — 94799 UNLISTED PULMONARY SVC/PX: CPT

## 2025-07-12 PROCEDURE — 94664 DEMO&/EVAL PT USE INHALER: CPT

## 2025-07-12 PROCEDURE — 94760 N-INVAS EAR/PLS OXIMETRY 1: CPT

## 2025-07-12 PROCEDURE — 25000003 PHARM REV CODE 250: Performed by: FAMILY MEDICINE

## 2025-07-12 PROCEDURE — 27000221 HC OXYGEN, UP TO 24 HOURS

## 2025-07-12 PROCEDURE — 85025 COMPLETE CBC W/AUTO DIFF WBC: CPT

## 2025-07-12 PROCEDURE — 94640 AIRWAY INHALATION TREATMENT: CPT

## 2025-07-12 PROCEDURE — 27000173 HC ACAPELLA DEVICE DH OR DM

## 2025-07-12 PROCEDURE — 36415 COLL VENOUS BLD VENIPUNCTURE: CPT

## 2025-07-12 PROCEDURE — 21400001 HC TELEMETRY ROOM

## 2025-07-12 PROCEDURE — 93005 ELECTROCARDIOGRAM TRACING: CPT

## 2025-07-12 PROCEDURE — 80053 COMPREHEN METABOLIC PANEL: CPT

## 2025-07-12 RX ORDER — PREDNISONE 20 MG/1
40 TABLET ORAL DAILY
Status: DISCONTINUED | OUTPATIENT
Start: 2025-07-12 | End: 2025-07-13

## 2025-07-12 RX ADMIN — CYCLOBENZAPRINE 10 MG: 10 TABLET, FILM COATED ORAL at 08:07

## 2025-07-12 RX ADMIN — LOSARTAN POTASSIUM 50 MG: 25 TABLET, FILM COATED ORAL at 08:07

## 2025-07-12 RX ADMIN — PREDNISONE 40 MG: 20 TABLET ORAL at 11:07

## 2025-07-12 RX ADMIN — MIRTAZAPINE 15 MG: 7.5 TABLET, FILM COATED ORAL at 08:07

## 2025-07-12 RX ADMIN — CEFTRIAXONE SODIUM 1 G: 1 INJECTION, POWDER, FOR SOLUTION INTRAMUSCULAR; INTRAVENOUS at 11:07

## 2025-07-12 RX ADMIN — ENOXAPARIN SODIUM 40 MG: 40 INJECTION SUBCUTANEOUS at 05:07

## 2025-07-12 RX ADMIN — METOPROLOL SUCCINATE 100 MG: 50 TABLET, EXTENDED RELEASE ORAL at 08:07

## 2025-07-12 RX ADMIN — DOCUSATE SODIUM 100 MG: 100 CAPSULE, LIQUID FILLED ORAL at 08:07

## 2025-07-12 RX ADMIN — OXCARBAZEPINE 600 MG: 150 TABLET, FILM COATED ORAL at 08:07

## 2025-07-12 RX ADMIN — IPRATROPIUM BROMIDE AND ALBUTEROL SULFATE 3 ML: .5; 3 SOLUTION RESPIRATORY (INHALATION) at 08:07

## 2025-07-12 RX ADMIN — CETIRIZINE HYDROCHLORIDE 10 MG: 10 TABLET, FILM COATED ORAL at 08:07

## 2025-07-12 RX ADMIN — IPRATROPIUM BROMIDE AND ALBUTEROL SULFATE 3 ML: .5; 3 SOLUTION RESPIRATORY (INHALATION) at 09:07

## 2025-07-12 RX ADMIN — FLUTICASONE PROPIONATE 100 MCG: 50 SPRAY, METERED NASAL at 08:07

## 2025-07-12 RX ADMIN — ASPIRIN 81 MG: 81 TABLET, COATED ORAL at 08:07

## 2025-07-12 RX ADMIN — MUPIROCIN: 20 OINTMENT TOPICAL at 08:07

## 2025-07-12 RX ADMIN — DILTIAZEM HYDROCHLORIDE 120 MG: 120 CAPSULE, COATED, EXTENDED RELEASE ORAL at 08:07

## 2025-07-12 RX ADMIN — FOLIC ACID 1 MG: 1 TABLET ORAL at 08:07

## 2025-07-12 RX ADMIN — METRONIDAZOLE 500 MG: 5 INJECTION, SOLUTION INTRAVENOUS at 02:07

## 2025-07-12 RX ADMIN — PANTOPRAZOLE SODIUM 40 MG: 40 TABLET, DELAYED RELEASE ORAL at 08:07

## 2025-07-12 RX ADMIN — METRONIDAZOLE 500 MG: 5 INJECTION, SOLUTION INTRAVENOUS at 06:07

## 2025-07-12 RX ADMIN — METRONIDAZOLE 500 MG: 5 INJECTION, SOLUTION INTRAVENOUS at 11:07

## 2025-07-12 RX ADMIN — IPRATROPIUM BROMIDE AND ALBUTEROL SULFATE 3 ML: .5; 3 SOLUTION RESPIRATORY (INHALATION) at 12:07

## 2025-07-12 NOTE — PROGRESS NOTES
[facility] Family Medicine Wards Progress Note     Resident Team: [facility] FM List   Attending Physician: Laura Calles MD  Resident: Axel Davis MD; Dr. Mccord    Subjective:      Brief HPI:  Anuj Caldwell is a 39 y.o.  male with a history of Friedreich's ataxia, neurogenic bladder, and hypertension who presented on 7/7/2025  with c/o  dark urine and cough onset 1 week ago.  Mother states the patient's urine has been dark, with visible sediment.  Patient has a suprapubic catheter in place . He has been coughing significantly more.  Cough has produced a pale yellow sputum. .  There was an occasion about week ago where the patient did spit up some meat, which least 2 mother feeling some concern for aspiration.  He has not had any fevers at home, he denies any abdominal pain.  There has been no nausea, vomiting, or diarrhea.  Of note, patient had urine cultures positive for Pseudomonas and Klebsiella in March of 2024.     In the ED, initial pulse was 79, blood pressure 136/92, temperature 98.4° F, satting 94% on room air.  Oxygen began to desaturate to the low 90s he was placed on 2 L nasal cannula.  Patient was also treated with DuoNebs.  Saturations began to be in the mid to high 90s on 2 L. white count was unremarkable at 8.59.  H&H was normal.  Sodium 131, potassium 3.7, all other electrolytes within normal limits.  Lactic acid was found to be normal, as was the BNP.  His urine was turbid, had 2+ protein, 3+ glucose, 500 leuks esterase, and greater than 100 RBCs and white blood cells.  Many bacteria were also noted.  The ED did draw 2 sets of blood cultures and urine culture.  Says x-ray and chest CT were done and showed evidence of potential atypical infection bilaterally.  Family medicine inpatient team was consulted for admission.      Interval History:   NAEON. Pt received Duoneb treatment this morning. He continues to have expiratory wheezing and bibasilar crackles post treatment. He denies any SOB or other acute  concerns. Will begin prednisone today and reevaluate tomorrow. Cough assist device will be delivered at home.     Review of Sytems:  ROS completed and negative except as indicated above.     Objective:     Last 24 Hour Vital Signs:  BP  Min: 125/83  Max: 154/104  Temp  Av °F (36.7 °C)  Min: 96.2 °F (35.7 °C)  Max: 98.8 °F (37.1 °C)  Pulse  Av.8  Min: 83  Max: 103  Resp  Av.3  Min: 16  Max: 22  SpO2  Av.1 %  Min: 92 %  Max: 98 %  I/O last 3 completed shifts:  In: 3642.2 [P.O.:480; I.V.:2317.8; IV Piggyback:844.4]  Out: 5250 [Urine:5250]    Physical Examination:  Physical Exam  Vitals reviewed.   Constitutional:       General: He is not in acute distress.     Appearance: He is not toxic-appearing.   Cardiovascular:      Rate and Rhythm: Normal rate and regular rhythm.   Pulmonary:      Effort: No respiratory distress.      Breath sounds: Wheezing present.      Comments: Expiratory wheezing and congested upper airway sounds present  Abdominal:      General: There is no distension.      Palpations: There is no mass.      Tenderness: There is no abdominal tenderness.      Comments: Suprapubic cath in place    Musculoskeletal:      Right lower leg: No edema.      Left lower leg: No edema.   Skin:     General: Skin is warm.      Coloration: Skin is not jaundiced.      Findings: No erythema or lesion.   Neurological:      Mental Status: He is alert.         Laboratory:  Most Recent Data:  CBC:   Lab Results   Component Value Date    WBC 7.02 2025    HGB 15.5 2025    HCT 44.6 2025     2025    MCV 83.7 2025    RDW 13.2 2025     WBC Differential:   Recent Labs   Lab 25  1427 25  0330 25  0319 07/10/25  0418 25  0825   WBC 8.59 8.93 10.26 7.58 7.02   HGB 16.4 15.8 15.7 15.4 15.5   HCT 47.8 45.7 45.8 44.8 44.6    352 351 284 287   MCV 83.0 82.2 83.0 84.7 83.7     BMP:   Lab Results   Component Value Date     (L) 2025    K  "4.1 07/12/2025     07/12/2025    CO2 26 07/12/2025    BUN 4.9 (L) 07/12/2025    CREATININE 0.76 07/12/2025    GLU 96 07/12/2025    CALCIUM 8.9 07/12/2025    MG 1.90 07/11/2025    PHOS 3.3 07/26/2021     LFTs:   Lab Results   Component Value Date    PROT 6.9 07/12/2025    ALBUMIN 3.5 07/12/2025    BILITOT 0.4 07/12/2025    AST 69 (H) 07/12/2025    ALKPHOS 100 07/12/2025    ALT 65 (H) 07/12/2025     Coags:   Lab Results   Component Value Date    INR 1.0 05/28/2024    PROTIME 13.1 02/24/2021     FLP:   Lab Results   Component Value Date    CHOL 181 02/26/2021    HDL 41 02/26/2021    TRIG 199 (H) 02/26/2021     DM:   Lab Results   Component Value Date    HGBA1C 5.1 06/06/2023    CREATININE 0.76 07/12/2025     Thyroid:   Lab Results   Component Value Date    TSH 0.5214 07/30/2021      Anemia: No results found for: "IRON", "TIBC", "FERRITIN", "SATURATEDIRO"  No results found for: "GEYDXLSZ65"  No results found for: "FOLATE"     Cardiac:   Lab Results   Component Value Date    TROPONINI 0.26 (H) 06/13/2024    CKTOTAL 289 (H) 09/04/2015    CKMB 0.5 09/04/2015    BNP 32.0 07/07/2025         Microbiology Data:  Microbiology Results (last 7 days)       Procedure Component Value Units Date/Time    Blood culture #1 **CANNOT BE ORDERED STAT** [[id number]]  (Normal) Collected: 07/07/25 1411    Order Status: Completed Specimen: Blood from Antecubital, Right Updated: 07/11/25 1901     Blood Culture No Growth At 96 Hours    Blood culture #2 **CANNOT BE ORDERED STAT** [[id number]]  (Normal) Collected: 07/07/25 1427    Order Status: Completed Specimen: Blood from Antecubital, Left Updated: 07/11/25 1901     Blood Culture No Growth At 96 Hours    Urine culture [[id number]]  (Abnormal)  (Susceptibility) Collected: 07/07/25 1429    Order Status: Completed Specimen: Urine, Supra Pubic Updated: 07/10/25 1012     Urine Culture >/= 100,000 colonies/ml Klebsiella oxytoca    Respiratory Culture [[id number]]     Order Status: Sent " Specimen: Sputum              Other Results:  EKG   Results for orders placed or performed during the hospital encounter of 01/17/17   EKG 12-lead    Collection Time: 01/17/17  2:24 PM    Narrative    Test Reason : I48.92  Blood Pressure : mmHG  Vent. Rate : 081 BPM     Atrial Rate : 081 BPM     P-R Int : 178 ms          QRS Dur : 080 ms      QT Int : 376 ms       P-R-T Axes : 052 077 024 degrees     QTc Int : 436 ms    Normal sinus rhythm  Normal ECG  When compared with ECG of 03-JUN-2016 10:32,  No significant change was found  Confirmed by Ernestina Noyola MD ([id number]) on 1/17/2017 4:10:31 PM    Referred By: ASHWIN NOYOLA           Confirmed By:Ernestina Noyola MD       Radiology:  Imaging Results              CT Chest Without Contrast (Final result)  Result time 07/07/25 15:58:18      Final result by Jessica Arauz MD (07/07/25 15:58:18)                   Impression:      Bilateral lower lobe pneumonia appears acute      Electronically signed by: Long Arauz  Date:    07/07/2025  Time:    15:58               Narrative:    EXAMINATION:  CT CHEST WITHOUT CONTRAST    CLINICAL HISTORY:  Aspiration;Respiratory illness, nondiagnostic xray;    TECHNIQUE:  Low dose axial images, sagittal and coronal reformations were obtained from the thoracic inlet to the lung bases. Contrast was not administered.  Automatic exposure control is utilized to reduce patient radiation exposure.    COMPARISON:  07/07/2025 chest x-ray    FINDINGS:  There is a bilateral lower lobe interstitial pneumonia.  This is slightly worse on the right.  There is developing consolidation in the right lower lobe.  No mass is seen.  No lesion is seen.  No pleural effusion is seen.  No pleural thickening is seen.  The mediastinum appears grossly unremarkable.  No abnormal lymphadenopathy is seen.  Thoracic aorta appears grossly unremarkable.  Heart appears normal.    Visualized portions of the upper abdomen show no acute abnormality.                                        X-Ray Chest 1 View (Final result)  Result time 07/07/25 15:02:55      Final result by Mejia Ordonez MD (07/07/25 15:02:55)                   Impression:      Findings suggestive of pulmonary edema or atypical infection      Electronically signed by: Mejia Ordonez MD  Date:    07/07/2025  Time:    15:02               Narrative:    EXAMINATION:  XR CHEST 1 VIEW    CLINICAL HISTORY:  Cough, unspecified    COMPARISON:  07/29/2021    FINDINGS:  Single view of the chest shows central vascular congestion with prominent interstitial opacities in the mid lower lungs.  No pneumothorax or large effusion.  Heart is upper normal in size.                                      Current Medications:     Infusions:         Scheduled:   albuterol-ipratropium  3 mL Nebulization Q6H WAKE    aspirin  81 mg Oral Daily    cefTRIAXone (Rocephin) IV (PEDS and ADULTS)  1 g Intravenous Q24H    cetirizine  10 mg Oral Daily    cyclobenzaprine  10 mg Oral QHS    diltiaZEM  120 mg Oral Daily    docusate sodium  100 mg Oral BID    enoxparin  40 mg Subcutaneous Daily    fluticasone propionate  2 spray Each Nostril BID    folic acid  1 mg Oral Daily    losartan  50 mg Oral Daily    metoprolol succinate  100 mg Oral Daily    metroNIDAZOLE IV (PEDS and ADULTS)  500 mg Intravenous Q8H    mirtazapine  15 mg Oral QHS    mupirocin   Nasal BID    NON FORMULARY MEDICATION 1 tablet  1 tablet Oral TID    NON FORMULARY MEDICATION 2 mg  2 mg Oral QHS    NON FORMULARY MEDICATION 20 mg  20 mg Oral QAM    OXcarbazepine  600 mg Oral BID    pantoprazole  40 mg Oral Daily    predniSONE  40 mg Oral Daily        PRN:    Current Facility-Administered Medications:     acetaminophen, 650 mg, Oral, Q6H PRN    dextrose 50%, 12.5 g, Intravenous, PRN    dextrose 50%, 25 g, Intravenous, PRN    glucagon (human recombinant), 1 mg, Intramuscular, PRN    glucose, 16 g, Oral, PRN    glucose, 24 g, Oral, PRN    guaiFENesin 100 mg/5 ml, 200 mg, Oral, Q4H PRN     hydrOXYzine pamoate, 25 mg, Oral, BID PRN    naloxone, 0.02 mg, Intravenous, PRN    sodium chloride 0.9%, 10 mL, Intravenous, Q12H PRN      Assessment & Plan:     Urinary tract infection 2/2 Klebsiella oxytoca  - UA with turbid urine, blood, leuk esterase, WBC, and bacteria  - Initially on Cefepime based on prior urine c/s from March 2024 while awaiting new urine cx results.  - Urine cx positive for Klebsiella oxytoca. Deescalated to ceftriaxone on 7/11/25 given current c/s.    Cough w bilateral lower lobe infiltrates   - initial differential included viral illness, pneumonia , COVID, atypical pneumonia  - Chest CT showed bilateral lower lobe infiltrates  - respiratory panel negative   - unable to obtain sputum culture  - blood cultures negative  - azithro discontinued  after 3 day course  - flagyl day 5 for possible aspiration  - aspiration lower on differential given normal barium swallow   - duonebs q6hr while awake  - will begin prednisone 40mg PO x 5 days due for continued wheezing   - Given pt's Friedreich's Ataxia, pt is unable to consistently clear his secretions w coughing. Reached out to [employer] for cough assist device  ; [employer] will deliver to pt's home at discharge     Friedreich's ataxia  - chronic condition, has multiple psych medications at home  - home psych meds provided by family (Rexulti, Trintellix)  - subacute catheter is in place, will need repeated after this urine infection resolved is treated  - continue his home folic acid, docusate, cyclobenzaprine, Zyrtec, and Flonase  - Speech recommended liquid diet on 7/8 and GI consult due to food bolus noted during barium swallow   - GI performed EGD 7/9 with no significant findings ; Diet advanced to minced and moist IDDSI Level V      Hypertension  Arrhythmia   - continue home diltiazem 120 mg, and losartan 50 mg, and metoprolol succinate 100 mg  - abnormal rhythm strip at 0342 on 7/11/25 dicussed with cardiology Dr. Diaz and is benign.   - Repeat  EKG this morning.        CODE STATUS: Full Code   Diet: minced and moist, IDDSI Level V  DVT Prophylaxis:   Anticoagulants   Medication Route Frequency    enoxaparin injection 40 mg Subcutaneous Daily         Patricia Delacruz DO  [facility], PGY-3

## 2025-07-12 NOTE — CARE UPDATE
Transition of Care Progress Note      Dr. Delacruz and I have received checkout from Dr. Davis regarding this patient.     HO1 assessment:  Admission diagnosis: Cough [R05.9]  Hypoxia [R09.02]  Chest pain [R07.9]  Complicated UTI (urinary tract infection) [N39.0]  Pneumonia of both lower lobes due to infectious organism [J18.9]       Overnight management:   Monitor vitals. PM meds as scheduled. DuoNeb on board. Diet minced & moist. Encourage IS.     Code status:   Full    Please call [phone] from 07/10/2025 4PM to 07/13/2025 7PM if any issues arise.    Bala Chino MD  [facility], [facility], HO-1  07/12/2025

## 2025-07-13 LAB
ALBUMIN SERPL-MCNC: 3.5 G/DL (ref 3.5–5)
ALBUMIN/GLOB SERPL: 0.9 RATIO (ref 1.1–2)
ALP SERPL-CCNC: 100 UNIT/L (ref 40–150)
ALT SERPL-CCNC: 81 UNIT/L (ref 0–55)
ANION GAP SERPL CALC-SCNC: 9 MEQ/L
AST SERPL-CCNC: 69 UNIT/L (ref 11–45)
BASOPHILS # BLD AUTO: 0.02 X10(3)/MCL
BASOPHILS NFR BLD AUTO: 0.2 %
BILIRUB SERPL-MCNC: 0.3 MG/DL
BUN SERPL-MCNC: 7.9 MG/DL (ref 8.9–20.6)
CALCIUM SERPL-MCNC: 9.1 MG/DL (ref 8.4–10.2)
CHLORIDE SERPL-SCNC: 99 MMOL/L (ref 98–107)
CO2 SERPL-SCNC: 23 MMOL/L (ref 22–29)
CREAT SERPL-MCNC: 0.82 MG/DL (ref 0.72–1.25)
CREAT/UREA NIT SERPL: 10
EOSINOPHIL # BLD AUTO: 0.01 X10(3)/MCL (ref 0–0.9)
EOSINOPHIL NFR BLD AUTO: 0.1 %
ERYTHROCYTE [DISTWIDTH] IN BLOOD BY AUTOMATED COUNT: 13 % (ref 11.5–17)
GFR SERPLBLD CREATININE-BSD FMLA CKD-EPI: >60 ML/MIN/1.73/M2
GLOBULIN SER-MCNC: 3.8 GM/DL (ref 2.4–3.5)
GLUCOSE SERPL-MCNC: 137 MG/DL (ref 74–100)
HCT VFR BLD AUTO: 46.1 % (ref 42–52)
HGB BLD-MCNC: 15.5 G/DL (ref 14–18)
IMM GRANULOCYTES # BLD AUTO: 0.06 X10(3)/MCL (ref 0–0.04)
IMM GRANULOCYTES NFR BLD AUTO: 0.6 %
LYMPHOCYTES # BLD AUTO: 1.5 X10(3)/MCL (ref 0.6–4.6)
LYMPHOCYTES NFR BLD AUTO: 14 %
MCH RBC QN AUTO: 28.3 PG (ref 27–31)
MCHC RBC AUTO-ENTMCNC: 33.6 G/DL (ref 33–36)
MCV RBC AUTO: 84.1 FL (ref 80–94)
MONOCYTES # BLD AUTO: 0.57 X10(3)/MCL (ref 0.1–1.3)
MONOCYTES NFR BLD AUTO: 5.3 %
NEUTROPHILS # BLD AUTO: 8.54 X10(3)/MCL (ref 2.1–9.2)
NEUTROPHILS NFR BLD AUTO: 79.8 %
NRBC BLD AUTO-RTO: 0 %
PLATELET # BLD AUTO: 347 X10(3)/MCL (ref 130–400)
PMV BLD AUTO: 8.8 FL (ref 7.4–10.4)
POTASSIUM SERPL-SCNC: 4.7 MMOL/L (ref 3.5–5.1)
PROT SERPL-MCNC: 7.3 GM/DL (ref 6.4–8.3)
RBC # BLD AUTO: 5.48 X10(6)/MCL (ref 4.7–6.1)
SODIUM SERPL-SCNC: 131 MMOL/L (ref 136–145)
WBC # BLD AUTO: 10.7 X10(3)/MCL (ref 4.5–11.5)

## 2025-07-13 PROCEDURE — 94760 N-INVAS EAR/PLS OXIMETRY 1: CPT

## 2025-07-13 PROCEDURE — 63600175 PHARM REV CODE 636 W HCPCS

## 2025-07-13 PROCEDURE — 36415 COLL VENOUS BLD VENIPUNCTURE: CPT

## 2025-07-13 PROCEDURE — 94640 AIRWAY INHALATION TREATMENT: CPT

## 2025-07-13 PROCEDURE — 27000221 HC OXYGEN, UP TO 24 HOURS

## 2025-07-13 PROCEDURE — 93005 ELECTROCARDIOGRAM TRACING: CPT

## 2025-07-13 PROCEDURE — 94664 DEMO&/EVAL PT USE INHALER: CPT

## 2025-07-13 PROCEDURE — 94799 UNLISTED PULMONARY SVC/PX: CPT | Mod: XB

## 2025-07-13 PROCEDURE — 99900035 HC TECH TIME PER 15 MIN (STAT)

## 2025-07-13 PROCEDURE — 25000242 PHARM REV CODE 250 ALT 637 W/ HCPCS

## 2025-07-13 PROCEDURE — 21400001 HC TELEMETRY ROOM

## 2025-07-13 PROCEDURE — 25000003 PHARM REV CODE 250

## 2025-07-13 PROCEDURE — 25000003 PHARM REV CODE 250: Performed by: FAMILY MEDICINE

## 2025-07-13 PROCEDURE — 85025 COMPLETE CBC W/AUTO DIFF WBC: CPT

## 2025-07-13 PROCEDURE — 80053 COMPREHEN METABOLIC PANEL: CPT

## 2025-07-13 PROCEDURE — 94761 N-INVAS EAR/PLS OXIMETRY MLT: CPT

## 2025-07-13 RX ADMIN — OXCARBAZEPINE 600 MG: 150 TABLET, FILM COATED ORAL at 08:07

## 2025-07-13 RX ADMIN — PREDNISONE 40 MG: 20 TABLET ORAL at 08:07

## 2025-07-13 RX ADMIN — PANTOPRAZOLE SODIUM 40 MG: 40 TABLET, DELAYED RELEASE ORAL at 08:07

## 2025-07-13 RX ADMIN — DILTIAZEM HYDROCHLORIDE 120 MG: 120 CAPSULE, COATED, EXTENDED RELEASE ORAL at 08:07

## 2025-07-13 RX ADMIN — CYCLOBENZAPRINE 10 MG: 10 TABLET, FILM COATED ORAL at 08:07

## 2025-07-13 RX ADMIN — IPRATROPIUM BROMIDE AND ALBUTEROL SULFATE 3 ML: .5; 3 SOLUTION RESPIRATORY (INHALATION) at 08:07

## 2025-07-13 RX ADMIN — LOSARTAN POTASSIUM 50 MG: 25 TABLET, FILM COATED ORAL at 08:07

## 2025-07-13 RX ADMIN — METOPROLOL SUCCINATE 100 MG: 50 TABLET, EXTENDED RELEASE ORAL at 08:07

## 2025-07-13 RX ADMIN — DOCUSATE SODIUM 100 MG: 100 CAPSULE, LIQUID FILLED ORAL at 08:07

## 2025-07-13 RX ADMIN — METRONIDAZOLE 500 MG: 5 INJECTION, SOLUTION INTRAVENOUS at 06:07

## 2025-07-13 RX ADMIN — IPRATROPIUM BROMIDE AND ALBUTEROL SULFATE 3 ML: .5; 3 SOLUTION RESPIRATORY (INHALATION) at 01:07

## 2025-07-13 RX ADMIN — ENOXAPARIN SODIUM 40 MG: 40 INJECTION SUBCUTANEOUS at 05:07

## 2025-07-13 RX ADMIN — FLUTICASONE PROPIONATE 100 MCG: 50 SPRAY, METERED NASAL at 08:07

## 2025-07-13 RX ADMIN — ASPIRIN 81 MG: 81 TABLET, COATED ORAL at 08:07

## 2025-07-13 RX ADMIN — FOLIC ACID 1 MG: 1 TABLET ORAL at 08:07

## 2025-07-13 RX ADMIN — CETIRIZINE HYDROCHLORIDE 10 MG: 10 TABLET, FILM COATED ORAL at 08:07

## 2025-07-13 RX ADMIN — MIRTAZAPINE 15 MG: 7.5 TABLET, FILM COATED ORAL at 08:07

## 2025-07-13 RX ADMIN — CEFTRIAXONE SODIUM 1 G: 1 INJECTION, POWDER, FOR SOLUTION INTRAMUSCULAR; INTRAVENOUS at 11:07

## 2025-07-13 RX ADMIN — FLUTICASONE PROPIONATE 100 MCG: 50 SPRAY, METERED NASAL at 09:07

## 2025-07-13 RX ADMIN — METRONIDAZOLE 500 MG: 5 INJECTION, SOLUTION INTRAVENOUS at 03:07

## 2025-07-13 RX ADMIN — METRONIDAZOLE 500 MG: 5 INJECTION, SOLUTION INTRAVENOUS at 11:07

## 2025-07-13 RX ADMIN — ACETAMINOPHEN 650 MG: 325 TABLET ORAL at 12:07

## 2025-07-13 NOTE — PROGRESS NOTES
[facility] Family Medicine Wards Progress Note     Resident Team: [facility] FM List   Attending Physician: Laura Calles MD  Resident: Axel Davis MD; Dr. Mccord    Subjective:      Brief HPI:  Anuj Caldwell is a 39 y.o.  male with a history of Friedreich's ataxia, neurogenic bladder, and hypertension who presented on 7/7/2025  with c/o  dark urine and cough onset 1 week ago.  Mother states the patient's urine has been dark, with visible sediment.  Patient has a suprapubic catheter in place . He has been coughing significantly more.  Cough has produced a pale yellow sputum. .  There was an occasion about week ago where the patient did spit up some meat, which least 2 mother feeling some concern for aspiration.  He has not had any fevers at home, he denies any abdominal pain.  There has been no nausea, vomiting, or diarrhea.  Of note, patient had urine cultures positive for Pseudomonas and Klebsiella in March of 2024.     In the ED, initial pulse was 79, blood pressure 136/92, temperature 98.4° F, satting 94% on room air.  Oxygen began to desaturate to the low 90s he was placed on 2 L nasal cannula.  Patient was also treated with DuoNebs.  Saturations began to be in the mid to high 90s on 2 L. white count was unremarkable at 8.59.  H&H was normal.  Sodium 131, potassium 3.7, all other electrolytes within normal limits.  Lactic acid was found to be normal, as was the BNP.  His urine was turbid, had 2+ protein, 3+ glucose, 500 leuks esterase, and greater than 100 RBCs and white blood cells.  Many bacteria were also noted.  The ED did draw 2 sets of blood cultures and urine culture.  Says x-ray and chest CT were done and showed evidence of potential atypical infection bilaterally.  Family medicine inpatient team was consulted for admission.      Interval History:   NAEON. Started on prednisone yesterday. Pt reports sweating overnight. He denies any SOB or other acute concerns. NC in place.     Review of Sytems:  ROS completed  and negative except as indicated above.     Objective:     Last 24 Hour Vital Signs:  BP  Min: 120/87  Max: 147/106  Temp  Av.6 °F (36.4 °C)  Min: 96.8 °F (36 °C)  Max: 98.5 °F (36.9 °C)  Pulse  Av.9  Min: 84  Max: 106  Resp  Av.7  Min: 14  Max: 20  SpO2  Av.9 %  Min: 93 %  Max: 98 %  I/O last 3 completed shifts:  In: 753.3 [P.O.:480; IV Piggyback:273.3]  Out: 3950 [Urine:3950]    Physical Examination:  Physical Exam  Vitals reviewed.   Constitutional:       General: He is not in acute distress.     Appearance: He is not toxic-appearing.   Cardiovascular:      Rate and Rhythm: Normal rate and regular rhythm.   Pulmonary:      Effort: No respiratory distress.      Breath sounds: Wheezing present.      Comments: Expiratory wheezes, improving.  Abdominal:      General: There is no distension.      Palpations: There is no mass.      Tenderness: There is no abdominal tenderness.      Comments: Suprapubic cath in place    Musculoskeletal:      Right lower leg: No edema.      Left lower leg: No edema.   Skin:     General: Skin is warm.      Coloration: Skin is not jaundiced.      Findings: No erythema or lesion.   Neurological:      Mental Status: He is alert.         Laboratory:  Most Recent Data:  CBC:   Lab Results   Component Value Date    WBC 10.70 2025    HGB 15.5 2025    HCT 46.1 2025     2025    MCV 84.1 2025    RDW 13.0 2025     WBC Differential:   Recent Labs   Lab 25  0330 25  0319 07/10/25  0418 25  0825 25  0333   WBC 8.93 10.26 7.58 7.02 10.70   HGB 15.8 15.7 15.4 15.5 15.5   HCT 45.7 45.8 44.8 44.6 46.1    351 284 287 347   MCV 82.2 83.0 84.7 83.7 84.1     BMP:   Lab Results   Component Value Date     (L) 2025    K 4.7 2025    CL 99 2025    CO2 23 2025    BUN 7.9 (L) 2025    CREATININE 0.82 2025     (H) 2025    CALCIUM 9.1 2025    MG 1.90 2025     "PHOS 3.3 07/26/2021     LFTs:   Lab Results   Component Value Date    PROT 7.3 07/13/2025    ALBUMIN 3.5 07/13/2025    BILITOT 0.3 07/13/2025    AST 69 (H) 07/13/2025    ALKPHOS 100 07/13/2025    ALT 81 (H) 07/13/2025     Coags:   Lab Results   Component Value Date    INR 1.0 05/28/2024    PROTIME 13.1 02/24/2021     FLP:   Lab Results   Component Value Date    CHOL 181 02/26/2021    HDL 41 02/26/2021    TRIG 199 (H) 02/26/2021     DM:   Lab Results   Component Value Date    HGBA1C 5.1 06/06/2023    CREATININE 0.82 07/13/2025     Thyroid:   Lab Results   Component Value Date    TSH 0.5214 07/30/2021      Anemia: No results found for: "IRON", "TIBC", "FERRITIN", "SATURATEDIRO"  No results found for: "KEVMEKXO17"  No results found for: "FOLATE"     Cardiac:   Lab Results   Component Value Date    TROPONINI 0.26 (H) 06/13/2024    CKTOTAL 289 (H) 09/04/2015    CKMB 0.5 09/04/2015    BNP 32.0 07/07/2025         Microbiology Data:  Microbiology Results (last 7 days)       Procedure Component Value Units Date/Time    Blood culture #1 **CANNOT BE ORDERED STAT** [[id number]]  (Normal) Collected: 07/07/25 1411    Order Status: Completed Specimen: Blood from Antecubital, Right Updated: 07/12/25 1901     Blood Culture No Growth at 5 days    Blood culture #2 **CANNOT BE ORDERED STAT** [[id number]]  (Normal) Collected: 07/07/25 1427    Order Status: Completed Specimen: Blood from Antecubital, Left Updated: 07/12/25 1901     Blood Culture No Growth at 5 days    Urine culture [[id number]]  (Abnormal)  (Susceptibility) Collected: 07/07/25 1429    Order Status: Completed Specimen: Urine, Supra Pubic Updated: 07/10/25 1012     Urine Culture >/= 100,000 colonies/ml Klebsiella oxytoca    Respiratory Culture [[id number]]     Order Status: Sent Specimen: Sputum              Other Results:  EKG   Results for orders placed or performed during the hospital encounter of 01/17/17   EKG 12-lead    Collection Time: 01/17/17  2:24 PM    Narrative "    Test Reason : I48.92  Blood Pressure : mmHG  Vent. Rate : 081 BPM     Atrial Rate : 081 BPM     P-R Int : 178 ms          QRS Dur : 080 ms      QT Int : 376 ms       P-R-T Axes : 052 077 024 degrees     QTc Int : 436 ms    Normal sinus rhythm  Normal ECG  When compared with ECG of 03-JUN-2016 10:32,  No significant change was found  Confirmed by Ernestina Noyola MD ([id number]) on 1/17/2017 4:10:31 PM    Referred By: ASHWIN NOYOLA           Confirmed By:Ernestina Noyola MD       Radiology:  Imaging Results              CT Chest Without Contrast (Final result)  Result time 07/07/25 15:58:18      Final result by Jessica Arauz MD (07/07/25 15:58:18)                   Impression:      Bilateral lower lobe pneumonia appears acute      Electronically signed by: Long Arauz  Date:    07/07/2025  Time:    15:58               Narrative:    EXAMINATION:  CT CHEST WITHOUT CONTRAST    CLINICAL HISTORY:  Aspiration;Respiratory illness, nondiagnostic xray;    TECHNIQUE:  Low dose axial images, sagittal and coronal reformations were obtained from the thoracic inlet to the lung bases. Contrast was not administered.  Automatic exposure control is utilized to reduce patient radiation exposure.    COMPARISON:  07/07/2025 chest x-ray    FINDINGS:  There is a bilateral lower lobe interstitial pneumonia.  This is slightly worse on the right.  There is developing consolidation in the right lower lobe.  No mass is seen.  No lesion is seen.  No pleural effusion is seen.  No pleural thickening is seen.  The mediastinum appears grossly unremarkable.  No abnormal lymphadenopathy is seen.  Thoracic aorta appears grossly unremarkable.  Heart appears normal.    Visualized portions of the upper abdomen show no acute abnormality.                                       X-Ray Chest 1 View (Final result)  Result time 07/07/25 15:02:55      Final result by Mejia Ordonez MD (07/07/25 15:02:55)                   Impression:      Findings suggestive of  pulmonary edema or atypical infection      Electronically signed by: Mejia Ordonez MD  Date:    07/07/2025  Time:    15:02               Narrative:    EXAMINATION:  XR CHEST 1 VIEW    CLINICAL HISTORY:  Cough, unspecified    COMPARISON:  07/29/2021    FINDINGS:  Single view of the chest shows central vascular congestion with prominent interstitial opacities in the mid lower lungs.  No pneumothorax or large effusion.  Heart is upper normal in size.                                      Current Medications:     Infusions:         Scheduled:   albuterol-ipratropium  3 mL Nebulization Q6H WAKE    aspirin  81 mg Oral Daily    cefTRIAXone (Rocephin) IV (PEDS and ADULTS)  1 g Intravenous Q24H    cetirizine  10 mg Oral Daily    cyclobenzaprine  10 mg Oral QHS    diltiaZEM  120 mg Oral Daily    docusate sodium  100 mg Oral BID    enoxparin  40 mg Subcutaneous Daily    fluticasone propionate  2 spray Each Nostril BID    folic acid  1 mg Oral Daily    losartan  50 mg Oral Daily    metoprolol succinate  100 mg Oral Daily    metroNIDAZOLE IV (PEDS and ADULTS)  500 mg Intravenous Q8H    mirtazapine  15 mg Oral QHS    NON FORMULARY MEDICATION 1 tablet  1 tablet Oral TID    NON FORMULARY MEDICATION 2 mg  2 mg Oral QHS    NON FORMULARY MEDICATION 20 mg  20 mg Oral QAM    OXcarbazepine  600 mg Oral BID    pantoprazole  40 mg Oral Daily    [START ON 7/14/2025] predniSONE  30 mg Oral Daily        PRN:    Current Facility-Administered Medications:     acetaminophen, 650 mg, Oral, Q6H PRN    dextrose 50%, 12.5 g, Intravenous, PRN    dextrose 50%, 25 g, Intravenous, PRN    glucagon (human recombinant), 1 mg, Intramuscular, PRN    glucose, 16 g, Oral, PRN    glucose, 24 g, Oral, PRN    guaiFENesin 100 mg/5 ml, 200 mg, Oral, Q4H PRN    hydrOXYzine pamoate, 25 mg, Oral, BID PRN    naloxone, 0.02 mg, Intravenous, PRN    sodium chloride 0.9%, 10 mL, Intravenous, Q12H PRN      Assessment & Plan:     Urinary tract infection 2/2 Klebsiella  oxytoca  - UA with turbid urine, blood, leuk esterase, WBC, and bacteria  - Initially on Cefepime based on prior urine c/s from March 2024 while awaiting new urine cx results.  - Urine cx positive for Klebsiella oxytoca. Deescalated to ceftriaxone on 7/11/25 given current c/s.    Cough w bilateral lower lobe infiltrates   - initial differential included viral illness, pneumonia , COVID, atypical pneumonia  - Chest CT showed bilateral lower lobe infiltrates  - respiratory panel negative   - unable to obtain sputum culture  - blood cultures negative  - azithro discontinued  after 3 day course  - flagyl day 5 for possible aspiration  - aspiration lower on differential given normal barium swallow   - duonebs q6hr while awake  - Prednisone 30mg PO x 5 days due for continued wheezing   - Continue weaning supplemental O2  - Given pt's Friedreich's Ataxia, pt is unable to consistently clear his secretions w coughing. Reached out to [employer] for cough assist device  ; [employer] will deliver to pt's home at discharge     Friedreich's ataxia  - chronic condition, has multiple psych medications at home  - home psych meds provided by family (Rexulti, Trintellix)  - subacute catheter is in place, will need repeated after this urine infection resolved is treated  - continue his home folic acid, docusate, cyclobenzaprine, Zyrtec, and Flonase  - Speech recommended liquid diet on 7/8 and GI consult due to food bolus noted during barium swallow   - GI performed EGD 7/9 with no significant findings ; Diet advanced to minced and moist IDDSI Level V      Hypertension  Arrhythmia   - continue home diltiazem 120 mg, and losartan 50 mg, and metoprolol succinate 100 mg  - abnormal rhythm strip at 0342 on 7/11/25 dicussed with cardiology Dr. Diaz and is benign. Repeat EKG with PVCs.        CODE STATUS: Full Code   Diet: minced and moist, IDDSI Level V  DVT Prophylaxis:   Anticoagulants   Medication Route Frequency    enoxaparin injection 40 mg  Subcutaneous Daily     Dispo: Day 6 of IV antibiotics. Continue to wean supplemental oxygen. Plan for discharge tomorrow.     Patricia Delacruz DO  [facility], PGY-3

## 2025-07-14 LAB
ALBUMIN SERPL-MCNC: 3.5 G/DL (ref 3.5–5)
ALBUMIN/GLOB SERPL: 0.9 RATIO (ref 1.1–2)
ALP SERPL-CCNC: 100 UNIT/L (ref 40–150)
ALT SERPL-CCNC: 182 UNIT/L (ref 0–55)
ANION GAP SERPL CALC-SCNC: 10 MEQ/L
AST SERPL-CCNC: 155 UNIT/L (ref 11–45)
BASOPHILS # BLD AUTO: 0.02 X10(3)/MCL
BASOPHILS NFR BLD AUTO: 0.2 %
BILIRUB SERPL-MCNC: 0.3 MG/DL
BUN SERPL-MCNC: 12 MG/DL (ref 8.9–20.6)
CALCIUM SERPL-MCNC: 9.1 MG/DL (ref 8.4–10.2)
CHLORIDE SERPL-SCNC: 98 MMOL/L (ref 98–107)
CO2 SERPL-SCNC: 24 MMOL/L (ref 22–29)
CREAT SERPL-MCNC: 1.01 MG/DL (ref 0.72–1.25)
CREAT/UREA NIT SERPL: 12
EOSINOPHIL # BLD AUTO: 0 X10(3)/MCL (ref 0–0.9)
EOSINOPHIL NFR BLD AUTO: 0 %
ERYTHROCYTE [DISTWIDTH] IN BLOOD BY AUTOMATED COUNT: 13.5 % (ref 11.5–17)
GFR SERPLBLD CREATININE-BSD FMLA CKD-EPI: >60 ML/MIN/1.73/M2
GLOBULIN SER-MCNC: 3.8 GM/DL (ref 2.4–3.5)
GLUCOSE SERPL-MCNC: 117 MG/DL (ref 74–100)
HCT VFR BLD AUTO: 46.6 % (ref 42–52)
HGB BLD-MCNC: 15.5 G/DL (ref 14–18)
IMM GRANULOCYTES # BLD AUTO: 0.05 X10(3)/MCL (ref 0–0.04)
IMM GRANULOCYTES NFR BLD AUTO: 0.4 %
LYMPHOCYTES # BLD AUTO: 1.77 X10(3)/MCL (ref 0.6–4.6)
LYMPHOCYTES NFR BLD AUTO: 14.8 %
MCH RBC QN AUTO: 28.4 PG (ref 27–31)
MCHC RBC AUTO-ENTMCNC: 33.3 G/DL (ref 33–36)
MCV RBC AUTO: 85.5 FL (ref 80–94)
MONOCYTES # BLD AUTO: 0.98 X10(3)/MCL (ref 0.1–1.3)
MONOCYTES NFR BLD AUTO: 8.2 %
NEUTROPHILS # BLD AUTO: 9.13 X10(3)/MCL (ref 2.1–9.2)
NEUTROPHILS NFR BLD AUTO: 76.4 %
NRBC BLD AUTO-RTO: 0 %
PLATELET # BLD AUTO: 369 X10(3)/MCL (ref 130–400)
PMV BLD AUTO: 9.6 FL (ref 7.4–10.4)
POTASSIUM SERPL-SCNC: 4.5 MMOL/L (ref 3.5–5.1)
PROT SERPL-MCNC: 7.3 GM/DL (ref 6.4–8.3)
RBC # BLD AUTO: 5.45 X10(6)/MCL (ref 4.7–6.1)
SODIUM SERPL-SCNC: 132 MMOL/L (ref 136–145)
WBC # BLD AUTO: 11.95 X10(3)/MCL (ref 4.5–11.5)

## 2025-07-14 PROCEDURE — 25000003 PHARM REV CODE 250

## 2025-07-14 PROCEDURE — 94664 DEMO&/EVAL PT USE INHALER: CPT

## 2025-07-14 PROCEDURE — 94761 N-INVAS EAR/PLS OXIMETRY MLT: CPT

## 2025-07-14 PROCEDURE — 25000003 PHARM REV CODE 250: Performed by: FAMILY MEDICINE

## 2025-07-14 PROCEDURE — 80053 COMPREHEN METABOLIC PANEL: CPT

## 2025-07-14 PROCEDURE — 25000242 PHARM REV CODE 250 ALT 637 W/ HCPCS

## 2025-07-14 PROCEDURE — 21400001 HC TELEMETRY ROOM

## 2025-07-14 PROCEDURE — 36415 COLL VENOUS BLD VENIPUNCTURE: CPT

## 2025-07-14 PROCEDURE — 94799 UNLISTED PULMONARY SVC/PX: CPT

## 2025-07-14 PROCEDURE — 63600175 PHARM REV CODE 636 W HCPCS

## 2025-07-14 PROCEDURE — 94668 MNPJ CHEST WALL SBSQ: CPT

## 2025-07-14 PROCEDURE — 94640 AIRWAY INHALATION TREATMENT: CPT

## 2025-07-14 PROCEDURE — 99900035 HC TECH TIME PER 15 MIN (STAT)

## 2025-07-14 PROCEDURE — 85025 COMPLETE CBC W/AUTO DIFF WBC: CPT

## 2025-07-14 RX ORDER — IBUPROFEN 400 MG/1
400 TABLET, FILM COATED ORAL EVERY 6 HOURS PRN
Status: DISCONTINUED | OUTPATIENT
Start: 2025-07-14 | End: 2025-07-15 | Stop reason: HOSPADM

## 2025-07-14 RX ORDER — CEFTRIAXONE 1 G/1
1 INJECTION, POWDER, FOR SOLUTION INTRAMUSCULAR; INTRAVENOUS
Status: DISCONTINUED | OUTPATIENT
Start: 2025-07-14 | End: 2025-07-14

## 2025-07-14 RX ADMIN — DILTIAZEM HYDROCHLORIDE 120 MG: 120 CAPSULE, COATED, EXTENDED RELEASE ORAL at 08:07

## 2025-07-14 RX ADMIN — MIRTAZAPINE 15 MG: 7.5 TABLET, FILM COATED ORAL at 09:07

## 2025-07-14 RX ADMIN — CETIRIZINE HYDROCHLORIDE 10 MG: 10 TABLET, FILM COATED ORAL at 08:07

## 2025-07-14 RX ADMIN — IPRATROPIUM BROMIDE AND ALBUTEROL SULFATE 3 ML: .5; 3 SOLUTION RESPIRATORY (INHALATION) at 07:07

## 2025-07-14 RX ADMIN — PREDNISONE 30 MG: 20 TABLET ORAL at 08:07

## 2025-07-14 RX ADMIN — IBUPROFEN 400 MG: 400 TABLET ORAL at 10:07

## 2025-07-14 RX ADMIN — FLUTICASONE PROPIONATE 100 MCG: 50 SPRAY, METERED NASAL at 08:07

## 2025-07-14 RX ADMIN — DOCUSATE SODIUM 100 MG: 100 CAPSULE, LIQUID FILLED ORAL at 08:07

## 2025-07-14 RX ADMIN — ASPIRIN 81 MG: 81 TABLET, COATED ORAL at 08:07

## 2025-07-14 RX ADMIN — OXCARBAZEPINE 600 MG: 150 TABLET, FILM COATED ORAL at 08:07

## 2025-07-14 RX ADMIN — DOCUSATE SODIUM 100 MG: 100 CAPSULE, LIQUID FILLED ORAL at 09:07

## 2025-07-14 RX ADMIN — LOSARTAN POTASSIUM 50 MG: 25 TABLET, FILM COATED ORAL at 08:07

## 2025-07-14 RX ADMIN — METOPROLOL SUCCINATE 100 MG: 50 TABLET, EXTENDED RELEASE ORAL at 08:07

## 2025-07-14 RX ADMIN — CYCLOBENZAPRINE 10 MG: 10 TABLET, FILM COATED ORAL at 09:07

## 2025-07-14 RX ADMIN — IPRATROPIUM BROMIDE AND ALBUTEROL SULFATE 3 ML: .5; 3 SOLUTION RESPIRATORY (INHALATION) at 01:07

## 2025-07-14 RX ADMIN — ENOXAPARIN SODIUM 40 MG: 40 INJECTION SUBCUTANEOUS at 05:07

## 2025-07-14 RX ADMIN — PANTOPRAZOLE SODIUM 40 MG: 40 TABLET, DELAYED RELEASE ORAL at 08:07

## 2025-07-14 RX ADMIN — METRONIDAZOLE 500 MG: 5 INJECTION, SOLUTION INTRAVENOUS at 03:07

## 2025-07-14 RX ADMIN — OXCARBAZEPINE 600 MG: 150 TABLET, FILM COATED ORAL at 09:07

## 2025-07-14 RX ADMIN — FLUTICASONE PROPIONATE 100 MCG: 50 SPRAY, METERED NASAL at 09:07

## 2025-07-14 RX ADMIN — FOLIC ACID 1 MG: 1 TABLET ORAL at 08:07

## 2025-07-14 NOTE — PROGRESS NOTES
[facility] Family Medicine Wards Progress Note     Resident Team: [facility] FM List   Attending Physician: Laura Calles MD  Resident: Axel Davis MD; Dr. Mccord    Subjective:      Brief HPI:  Anuj Caldwell is a 39 y.o.  male with a history of Friedreich's ataxia, neurogenic bladder, and hypertension who presented on 7/7/2025  with c/o  dark urine and cough onset 1 week ago.  Mother states the patient's urine has been dark, with visible sediment.  Patient has a suprapubic catheter in place . He has been coughing significantly more.  Cough has produced a pale yellow sputum. .  There was an occasion about week ago where the patient did spit up some meat, which least 2 mother feeling some concern for aspiration.  He has not had any fevers at home, he denies any abdominal pain.  There has been no nausea, vomiting, or diarrhea.  Of note, patient had urine cultures positive for Pseudomonas and Klebsiella in March of 2024.     In the ED, initial pulse was 79, blood pressure 136/92, temperature 98.4° F, satting 94% on room air.  Oxygen began to desaturate to the low 90s he was placed on 2 L nasal cannula.  Patient was also treated with DuoNebs.  Saturations began to be in the mid to high 90s on 2 L. white count was unremarkable at 8.59.  H&H was normal.  Sodium 131, potassium 3.7, all other electrolytes within normal limits.  Lactic acid was found to be normal, as was the BNP.  His urine was turbid, had 2+ protein, 3+ glucose, 500 leuks esterase, and greater than 100 RBCs and white blood cells.  Many bacteria were also noted.  The ED did draw 2 sets of blood cultures and urine culture.  Says x-ray and chest CT were done and showed evidence of potential atypical infection bilaterally.  Family medicine inpatient team was consulted for admission.      Interval History:   NAEON. VSS. Continues to tolerate prednisone. NC off, breathing improved. Pt states he is not short of breath today.  There was an elevation in his LFTs    Review  of Sytems:  ROS completed and negative except as indicated above.     Objective:     Last 24 Hour Vital Signs:  BP  Min: 103/70  Max: 138/83  Temp  Av.9 °F (36.6 °C)  Min: 97.1 °F (36.2 °C)  Max: 98.5 °F (36.9 °C)  Pulse  Av.4  Min: 86  Max: 106  Resp  Av.3  Min: 16  Max: 18  SpO2  Av %  Min: 92 %  Max: 96 %  I/O last 3 completed shifts:  In: 509.7 [P.O.:240; IV Piggyback:269.7]  Out: 2600 [Urine:2600]    Physical Examination:  Physical Exam  Vitals reviewed.   Constitutional:       General: He is not in acute distress.     Appearance: He is not toxic-appearing.   Cardiovascular:      Rate and Rhythm: Normal rate and regular rhythm.   Pulmonary:      Effort: No respiratory distress.      Breath sounds: Wheezing present.      Comments: Faint Expiratory wheezes,    Abdominal:      General: There is no distension.      Palpations: There is no mass.      Tenderness: There is no abdominal tenderness.      Comments: Suprapubic cath in place    Musculoskeletal:      Right lower leg: No edema.      Left lower leg: No edema.   Skin:     General: Skin is warm.      Coloration: Skin is not jaundiced.      Findings: No erythema or lesion.   Neurological:      Mental Status: He is alert.         Laboratory:  Most Recent Data:  CBC:   Lab Results   Component Value Date    WBC 11.95 (H) 2025    HGB 15.5 2025    HCT 46.6 2025     2025    MCV 85.5 2025    RDW 13.5 2025     WBC Differential:   Recent Labs   Lab 25  0319 07/10/25  0418 25  0825 25  0333 25  0327   WBC 10.26 7.58 7.02 10.70 11.95*   HGB 15.7 15.4 15.5 15.5 15.5   HCT 45.8 44.8 44.6 46.1 46.6    284 287 347 369   MCV 83.0 84.7 83.7 84.1 85.5     BMP:   Lab Results   Component Value Date     (L) 2025    K 4.5 2025    CL 98 2025    CO2 24 2025    BUN 12.0 2025    CREATININE 1.01 2025     (H) 2025    CALCIUM 9.1 2025     "MG 1.90 07/11/2025    PHOS 3.3 07/26/2021     LFTs:   Lab Results   Component Value Date    PROT 7.3 07/14/2025    ALBUMIN 3.5 07/14/2025    BILITOT 0.3 07/14/2025     (H) 07/14/2025    ALKPHOS 100 07/14/2025     (H) 07/14/2025     Coags:   Lab Results   Component Value Date    INR 1.0 05/28/2024    PROTIME 13.1 02/24/2021     FLP:   Lab Results   Component Value Date    CHOL 181 02/26/2021    HDL 41 02/26/2021    TRIG 199 (H) 02/26/2021     DM:   Lab Results   Component Value Date    HGBA1C 5.1 06/06/2023    CREATININE 1.01 07/14/2025     Thyroid:   Lab Results   Component Value Date    TSH 0.5214 07/30/2021      Anemia: No results found for: "IRON", "TIBC", "FERRITIN", "SATURATEDIRO"  No results found for: "BODKFUDK17"  No results found for: "FOLATE"     Cardiac:   Lab Results   Component Value Date    TROPONINI 0.26 (H) 06/13/2024    CKTOTAL 289 (H) 09/04/2015    CKMB 0.5 09/04/2015    BNP 32.0 07/07/2025         Microbiology Data:  Microbiology Results (last 7 days)       Procedure Component Value Units Date/Time    Blood culture #1 **CANNOT BE ORDERED STAT** [[id number]]  (Normal) Collected: 07/07/25 1411    Order Status: Completed Specimen: Blood from Antecubital, Right Updated: 07/12/25 1901     Blood Culture No Growth at 5 days    Blood culture #2 **CANNOT BE ORDERED STAT** [[id number]]  (Normal) Collected: 07/07/25 1427    Order Status: Completed Specimen: Blood from Antecubital, Left Updated: 07/12/25 1901     Blood Culture No Growth at 5 days    Urine culture [[id number]]  (Abnormal)  (Susceptibility) Collected: 07/07/25 1429    Order Status: Completed Specimen: Urine, Supra Pubic Updated: 07/10/25 1012     Urine Culture >/= 100,000 colonies/ml Klebsiella oxytoca    Respiratory Culture [[id number]]     Order Status: Sent Specimen: Sputum              Other Results:  EKG   Results for orders placed or performed during the hospital encounter of 01/17/17   EKG 12-lead    Collection Time: " 01/17/17  2:24 PM    Narrative    Test Reason : I48.92  Blood Pressure : mmHG  Vent. Rate : 081 BPM     Atrial Rate : 081 BPM     P-R Int : 178 ms          QRS Dur : 080 ms      QT Int : 376 ms       P-R-T Axes : 052 077 024 degrees     QTc Int : 436 ms    Normal sinus rhythm  Normal ECG  When compared with ECG of 03-JUN-2016 10:32,  No significant change was found  Confirmed by Ernestina Noyola MD ([id number]) on 1/17/2017 4:10:31 PM    Referred By: ASHWIN NOYOLA           Confirmed By:Ernestina Noyola MD       Radiology:  Imaging Results              CT Chest Without Contrast (Final result)  Result time 07/07/25 15:58:18      Final result by Jessica Arauz MD (07/07/25 15:58:18)                   Impression:      Bilateral lower lobe pneumonia appears acute      Electronically signed by: Long Arauz  Date:    07/07/2025  Time:    15:58               Narrative:    EXAMINATION:  CT CHEST WITHOUT CONTRAST    CLINICAL HISTORY:  Aspiration;Respiratory illness, nondiagnostic xray;    TECHNIQUE:  Low dose axial images, sagittal and coronal reformations were obtained from the thoracic inlet to the lung bases. Contrast was not administered.  Automatic exposure control is utilized to reduce patient radiation exposure.    COMPARISON:  07/07/2025 chest x-ray    FINDINGS:  There is a bilateral lower lobe interstitial pneumonia.  This is slightly worse on the right.  There is developing consolidation in the right lower lobe.  No mass is seen.  No lesion is seen.  No pleural effusion is seen.  No pleural thickening is seen.  The mediastinum appears grossly unremarkable.  No abnormal lymphadenopathy is seen.  Thoracic aorta appears grossly unremarkable.  Heart appears normal.    Visualized portions of the upper abdomen show no acute abnormality.                                       X-Ray Chest 1 View (Final result)  Result time 07/07/25 15:02:55      Final result by Mejia Ordonez MD (07/07/25 15:02:55)                    Impression:      Findings suggestive of pulmonary edema or atypical infection      Electronically signed by: Mejia Ordonez MD  Date:    07/07/2025  Time:    15:02               Narrative:    EXAMINATION:  XR CHEST 1 VIEW    CLINICAL HISTORY:  Cough, unspecified    COMPARISON:  07/29/2021    FINDINGS:  Single view of the chest shows central vascular congestion with prominent interstitial opacities in the mid lower lungs.  No pneumothorax or large effusion.  Heart is upper normal in size.                                      Current Medications:     Infusions:         Scheduled:   albuterol-ipratropium  3 mL Nebulization Q6H WAKE    aspirin  81 mg Oral Daily    cetirizine  10 mg Oral Daily    cyclobenzaprine  10 mg Oral QHS    diltiaZEM  120 mg Oral Daily    docusate sodium  100 mg Oral BID    enoxparin  40 mg Subcutaneous Daily    fluticasone propionate  2 spray Each Nostril BID    folic acid  1 mg Oral Daily    losartan  50 mg Oral Daily    metoprolol succinate  100 mg Oral Daily    mirtazapine  15 mg Oral QHS    NON FORMULARY MEDICATION 1 tablet  1 tablet Oral TID    NON FORMULARY MEDICATION 2 mg  2 mg Oral QHS    NON FORMULARY MEDICATION 20 mg  20 mg Oral QAM    OXcarbazepine  600 mg Oral BID    pantoprazole  40 mg Oral Daily    predniSONE  30 mg Oral Daily        PRN:    Current Facility-Administered Medications:     acetaminophen, 650 mg, Oral, Q6H PRN    dextrose 50%, 12.5 g, Intravenous, PRN    dextrose 50%, 25 g, Intravenous, PRN    glucagon (human recombinant), 1 mg, Intramuscular, PRN    glucose, 16 g, Oral, PRN    glucose, 24 g, Oral, PRN    guaiFENesin 100 mg/5 ml, 200 mg, Oral, Q4H PRN    hydrOXYzine pamoate, 25 mg, Oral, BID PRN    naloxone, 0.02 mg, Intravenous, PRN    sodium chloride 0.9%, 10 mL, Intravenous, Q12H PRN      Assessment & Plan:     Urinary tract infection 2/2 Klebsiella oxytoca  - UA with turbid urine, blood, leuk esterase, WBC, and bacteria  - Initially on Cefepime based on prior urine  c/s from March 2024 while awaiting new urine cx results.  - Urine cx positive for Klebsiella oxytoca. Deescalated to ceftriaxone on 7/11/25 given current c/s.  - DC rocephin 2/2 to elevated LFTs.  -     Cough w bilateral lower lobe infiltrates   - initial differential included viral illness, pneumonia , COVID, atypical pneumonia  - Chest CT showed bilateral lower lobe infiltrates  - respiratory panel negative   - unable to obtain sputum culture  - blood cultures negative  - azithro discontinued  after 3 day course  - flagyl day 6 for possible aspiration  - aspiration lower on differential given normal barium swallow   - duonebs q6hr while awake  - Prednisone 30mg PO x 5 days due for continued wheezing . Today is day 1/5  - Continue weaning supplemental O2  - Given pt's Friedreich's Ataxia, pt is unable to consistently clear his secretions w coughing. Reached out to [employer] for cough assist device  ; [employer] will deliver to pt's home at discharge     Friedreich's ataxia  - chronic condition, has multiple psych medications at home  - home psych meds provided by family ([employer]exulti, Trintellix)  - subacute catheter is in place, will need repeated after this urine infection resolved is treated  - continue his home folic acid, docusate, cyclobenzaprine, Zyrtec, and Flonase  - Speech recommended liquid diet on 7/8 and GI consult due to food bolus noted during barium swallow   - GI performed EGD 7/9 with no significant findings ; Diet advanced to minced and moist IDDSI Level V      Hypertension  Arrhythmia   - continue home diltiazem 120 mg, and losartan 50 mg, and metoprolol succinate 100 mg  - abnormal rhythm strip at 0342 on 7/11/25 dicussed with cardiology Dr. Diaz and is benign. Repeat EKG with PVCs.        CODE STATUS: Full Code   Diet: minced and moist, IDDSI Level V  DVT Prophylaxis:   Anticoagulants   Medication Route Frequency    enoxaparin injection 40 mg Subcutaneous Daily     Dispo: Day 7 of IV antibiotics. Continue  to wean supplemental oxygen. Monitor LFTs with cmp. Anticipating discharge tomorrow morning     Axel Davis MD  [facility], PGY-3

## 2025-07-14 NOTE — DISCHARGE SUMMARY
[facility] Discharge Summary    Admitting Physician: Laura Calles MD  Attending Physician: Dr. Quiros  Date of Admit: 7/7/2025  Date of Discharge: 7/15/2025    Condition: Good  Outcome: Patient tolerated treatment/procedure well without complication and is now ready for discharge.  DISPOSITION: Home-Health Care [facility]      Discharge Diagnoses     Problem List[1]    Principal Problem:  Acute cystitis with hematuria    Consultants and Procedures     Consultants:  IP CONSULT TO GI  IP CONSULT TO SOCIAL WORK/CASE MANAGEMENT    Procedures:   Procedure(s) (LRB):  EGD (ESOPHAGOGASTRODUODENOSCOPY) (Left)     Brief Admission History      Anuj Caldwell is a 39 y.o.  male with a history of Friedreich's ataxia, neurogenic bladder, and hypertension who presented on 7/7/2025  with c/o  dark urine and cough onset 1 week ago.  Mother states the patient's urine has been dark, with visible sediment.  Patient has a suprapubic catheter in place . He has been coughing significantly more.  Cough has produced a pale yellow sputum. .  There was an occasion about week ago where the patient did spit up some meat, which least 2 mother feeling some concern for aspiration.  He has not had any fevers at home, he denies any abdominal pain.  There has been no nausea, vomiting, or diarrhea.  Of note, patient had urine cultures positive for Pseudomonas and Klebsiella in March of 2024.     In the ED, initial pulse was 79, blood pressure 136/92, temperature 98.4° F, satting 94% on room air.  Oxygen began to desaturate to the low 90s he was placed on 2 L nasal cannula.  Patient was also treated with DuoNebs.  Saturations began to be in the mid to high 90s on 2 L. white count was unremarkable at 8.59.  H&H was normal.  Sodium 131, potassium 3.7, all other electrolytes within normal limits.  Lactic acid was found to be normal, as was the BNP.  His urine was turbid, had 2+ protein, 3+ glucose, 500 leuks esterase, and greater than 100 RBCs and white  "blood cells.  Many bacteria were also noted.  The ED did draw 2 sets of blood cultures and urine culture.  Says x-ray and chest CT were done and showed evidence of potential atypical infection bilaterally.  Family medicine inpatient team was consulted for admission.       Hospital Course with Pertinent Findings     On admission, patient was initiated on IV Cefepime, azithromycin, and Flagyl for coverage of atypical pneumonia and UTI based upon prior urine c/s. On day 4 of antibiotics, urine culture from this admission resulted positive for Klebsiella with sensitivity to Rocephin, at which time pt was switched to Rocephin. Sputum culture was unable to be obtained due to significant respiratory muscular weakness resulting in inability to cough up sputum. However, he has received a total of 7 days of IV antibiotics for coverage. He was also started on oral Prednisone, which will be continued for total of 5 days upon discharge. Pt has remained afebrile with no leukocytosis since admission. He had a brief elevation in his LFTs which improved on the day of discharge. Elevation is thought to be AE of Rocephin. Given his neuromuscular disease and inability to clear secretions, orders for cough assist device and AVAPS have been placed for home use.     Discharge physical exam:  Vitals  BP: (!) 133/97  Temp: 97.9 °F (36.6 °C)  Temp Source: Oral  Pulse: 105  Resp: 16  SpO2: 95 %  Height: 5' 2" (157.5 cm)  Weight: 87.7 kg (193 lb 5.5 oz)    Physical Exam  Vitals reviewed.   Constitutional:       General: He is not in acute distress.  Cardiovascular:      Rate and Rhythm: Normal rate and regular rhythm.      Heart sounds: No murmur heard.  Pulmonary:      Effort: No respiratory distress.      Comments: + faint expiratory wheezing   Abdominal:      General: There is no distension.      Palpations: Abdomen is soft.      Tenderness: There is no guarding.      Comments: Suprapubic cath in place    Musculoskeletal:      Right lower " leg: No edema.      Left lower leg: No edema.   Skin:     General: Skin is warm.      Capillary Refill: Capillary refill takes less than 2 seconds.      Findings: No rash.   Neurological:      Mental Status: He is alert. Mental status is at baseline.           TIME SPENT ON DISCHARGE: 60 minutes    Discharge Medications        Medication List        CONTINUE taking these medications      aspirin 81 MG EC tablet  Commonly known as: ECOTRIN  Take 1 tablet (81 mg total) by mouth once daily.     brexpiprazole 2 mg Tab  Commonly known as: REXULTI     cetirizine 10 MG tablet  Commonly known as: ZYRTEC  Take 1 tablet (10 mg total) by mouth once daily.     diltiaZEM 240 MG Cdcr  Commonly known as: DILACOR XR  Take 1 capsule (240 mg total) by mouth 2 (two) times a day.     docusate sodium 100 MG capsule  Commonly known as: COLACE     fluticasone propionate 50 mcg/actuation nasal spray  Commonly known as: FLONASE  2 sprays (100 mcg total) by Each Nostril route 2 (two) times daily.     folic acid 1 MG tablet  Commonly known as: FOLVITE     hydrOXYzine pamoate 25 MG Cap  Commonly known as: VISTARIL     losartan 50 MG tablet  Commonly known as: COZAAR     metoprolol succinate 25 MG 24 hr tablet  Commonly known as: TOPROL-XL     mirtazapine 30 MG tablet  Commonly known as: REMERON  Take 1 tablet (30 mg total) by mouth every evening.     mupirocin 2 % ointment  Commonly known as: BACTROBAN  Apply topically 2 (two) times daily.     ondansetron 8 MG Tbdl  Commonly known as: ZOFRAN-ODT  Take 1 tablet (8 mg total) by mouth 2 (two) times daily.     OXcarbazepine 600 MG Tab  Commonly known as: TRILEPTAL  Take 1 tablet (600 mg total) by mouth 2 (two) times daily as needed (Friedreich ataxia).     pantoprazole 40 MG tablet  Commonly known as: PROTONIX  Take 1 tablet (40 mg total) by mouth once daily.     triamcinolone acetonide 0.1% 0.1 % cream  Commonly known as: KENALOG  Apply topically 2 (two) times daily.     UNABLE TO FIND  DME name:  Hospital bed mattress  Duration: 99 months  Diagnosis: Friedreich ataxia, code: G11.11     UNABLE TO FIND  Hospital bed mattress, length of need -  99, dx CodeG11.11 Fredreich 's Ataxia     UNABLE TO FIND  Hospital bed mattress,length of need -  99, dx CodeG11.11 Fredreich 's Ataxia     UNABLE TO FIND  Diagnosis: Friedreich's Ataxia  DME: Wheel chair  Duration: Lifetime  Specification:    -Head and neck rest   -with tilt mechanism   -adult regular size to fit patient's need.     vortioxetine 20 mg Tab  Commonly known as: TRINTELLIX            STOP taking these medications      levoFLOXacin 750 MG tablet  Commonly known as: LEVAQUIN            ASK your doctor about these medications      phenazopyridine 200 MG tablet  Commonly known as: PYRIDIUM     tacrolimus 0.1 % ointment  Commonly known as: PROTOPIC  Apply topically 2 (two) times daily.              Discharge Information:     F/u with PCP in 1 week   ED precautions discussed. Pt to return to ED for new fever, increased SOB, CP, dysuria, or any other new or worsening symptoms  Continue above meds  Suprapubic cath to be changed by Home health nurse  CBC outpatient ordered        Follow-up Information       Kang Merrill MD Follow up in 1 week(s).    Specialty: Family Medicine  Contact information:  [address] [facility] [address]  [phone]                             Future Appointments   Date Time Provider Department Center   7/21/2025  3:20 PM Patricia Delacruz DO [facility] RES [place]   10/15/2025 10:00 AM Isabel San MD [facility] NEURO [place] [facility]   10/15/2025 11:30 AM Madison Fowler FNP [facility] ENT [place] [facility]       Axel Davis MD  [facility] Family Medicine, PGY-3                 [1]  Patient Active Problem List  Diagnosis    Atrial fibrillation    Atrial flutter    Primary cardiomyopathy    Friedreich's ataxia    S/P ablation of atrial fibrillation    Neurogenic bladder    Kidney stones    History of fibromyalgia    Epilepsy     Gastroesophageal reflux disease    Primary hypertension    Schizophrenia    Spasm    Symptomatic cholelithiasis    Major depressive disorder with single episode    Penile erosion    Acute cystitis with hematuria    Bilateral pulmonary infiltrates    Pneumonia of both lower lobes due to infectious organism

## 2025-07-14 NOTE — CARE UPDATE
TRANSITION OF CARE PROGRESS NOTE    I have received checkout from [facility] Class of 2025: [facility] regarding this patient.     HO I assessment:  Admission diagnosis: UTI Klebiella, cough with bilateral lobe infiltrates  Overnight management: Continue rocephin and flagyl. Duonebs q6 while awake. Monitor vitals. Weaning supplemental O2 as tolerated.  Code status: Full code    Please call [phone] from 07/13/2025 4PM to 7/14/2025 7AM if any issues arise.    Zay Garland MD  [facility] HO-I

## 2025-07-14 NOTE — CARE UPDATE
TRANSITION OF CARE PROGRESS NOTE    I have received checkout from [facility] Class of 2025: Dr. Davis regarding this patient.     HO I assessment:  Admission diagnosis: UTI Klebiella, cough with bilateral lobe infiltrates  Overnight management: Duonebs q6 while awake. Monitor vitals. Weaning supplemental O2 as tolerated.  Code status: Full code    Please call [phone] from 07/14/2025 4PM to 7/15/2025 7AM if any issues arise.    Zay Garland MD  [facility] HO-I

## 2025-07-14 NOTE — PROGRESS NOTES
Inpatient Nutrition Assessment    Admit Date: 7/7/2025   Total duration of encounter: 7 days   Patient Age: 39 y.o.    Nutrition Recommendation/Prescription     Continue minced/moist diet; no oatmeal ; likes yogurt--pt preference  Ordered ONS--vanilla boost tid ; Boost (provides 240 kcal, 10 g protein per serving)   MVI/fe  Biweekly wt  Will monitor nutrition status/diet tolerance     Communication of Recommendations: reviewed with nurse and reviewed with patient    Nutrition Assessment     Malnutrition Assessment/Nutrition-Focused Physical Exam       Malnutrition Level: other (see comments) (Does not meet criteria) (07/08/25 1132)  Energy Intake (Malnutrition): other (see comments) (Does not meet criteria) (07/08/25 1132)  Weight Loss (Malnutrition): other (see comments) (Does not meet criteria) (07/08/25 1132)         Clavicle Bone Region (Muscle Loss): well nourished            Fluid Accumulation (Malnutrition): other (see comments) (Not present) (07/08/25 1132)     Hand  Strength, Right (Malnutrition): Unable to assess (07/08/25 1132)  A minimum of two characteristics is recommended for diagnosis of either severe or non-severe malnutrition.    Chart Review    Reason Seen: malnutrition screening tool (MST) and follow-up    Malnutrition Screening Tool Results   Have you recently lost weight without trying?: Unsure  Have you been eating poorly because of a decreased appetite?: No   MST Score: 2   Diagnosis:  UTI, cough/ B lobe infiltrates, Friedreichatoxia, HTN     Relevant Medical History: Friedreichataxia, neurogenic bladder, HTN     Scheduled Medications:  albuterol-ipratropium, 3 mL, Q6H WAKE  aspirin, 81 mg, Daily  cetirizine, 10 mg, Daily  cyclobenzaprine, 10 mg, QHS  diltiaZEM, 120 mg, Daily  docusate sodium, 100 mg, BID  enoxparin, 40 mg, Daily  fluticasone propionate, 2 spray, BID  folic acid, 1 mg, Daily  losartan, 50 mg, Daily  metoprolol succinate, 100 mg, Daily  mirtazapine, 15 mg, QHS  NON  FORMULARY MEDICATION 1 tablet, 1 tablet, TID  NON FORMULARY MEDICATION 2 mg, 2 mg, QHS  NON FORMULARY MEDICATION 20 mg, 20 mg, QAM  OXcarbazepine, 600 mg, BID  pantoprazole, 40 mg, Daily  predniSONE, 30 mg, Daily    Continuous Infusions:   PRN Medications:  dextrose 50%, 12.5 g, PRN  dextrose 50%, 25 g, PRN  glucagon (human recombinant), 1 mg, PRN  glucose, 16 g, PRN  glucose, 24 g, PRN  guaiFENesin 100 mg/5 ml, 200 mg, Q4H PRN  hydrOXYzine pamoate, 25 mg, BID PRN  ibuprofen, 400 mg, Q6H PRN  naloxone, 0.02 mg, PRN  sodium chloride 0.9%, 10 mL, Q12H PRN    Calorie Containing IV Medications: no significant kcals from medications at this time    Recent Labs   Lab 07/07/25  1427 07/08/25  0330 07/09/25  0319 07/10/25  0418 07/11/25  0942 07/12/25  0825 07/13/25  0333 07/14/25  0327   * 129* 135* 137 135* 133* 131* 132*   K 3.7 4.4 3.9 3.8 3.7 4.1 4.7 4.5   CALCIUM 9.5 9.1 9.0 8.5 9.0 8.9 9.1 9.1   MG  --   --   --   --  1.90  --   --   --    CL 96* 98 98 104 103 100 99 98   CO2 26 20* 24 24 23 26 23 24   BUN 9.8 8.9 11.9 11.1 7.8* 4.9* 7.9* 12.0   CREATININE 0.92 0.77 0.90 0.84 0.78 0.76 0.82 1.01   EGFRNORACEVR >60 >60 >60 >60 >60 >60 >60 >60   * 124* 110* 87 125* 96 137* 117*   BILITOT 0.4 0.5 0.5 0.6  --  0.4 0.3 0.3   ALKPHOS 139 123 110 97  --  100 100 100   ALT 24 21 25 20  --  65* 81* 182*   AST 20 19 20 20  --  69* 69* 155*   ALBUMIN 3.9 3.6 3.6 3.3*  --  3.5 3.5 3.5   WBC 8.59 8.93 10.26 7.58  --  7.02 10.70 11.95*   HGB 16.4 15.8 15.7 15.4  --  15.5 15.5 15.5   HCT 47.8 45.7 45.8 44.8  --  44.6 46.1 46.6     Nutrition Orders:  Diet Minced & Moist (IDDSI Level 5)  Dietary nutrition supplements TID; Boost Original Nutritional Drink - Vanilla    Appetite/Oral Intake: good/% of meals  Factors Affecting Nutritional Intake: none identified  Social Needs Impacting Access to Food: none identified  Food/Islam/Cultural Preferences: none reported  Food Allergies: none reported  Last Bowel  "Movement: 25  Wound(s):  none reported     Comments  () Pt laying in bed; nurse reported pt eating well--% meals; likes yogurt; needs feeding assistance. Ordered ONS; encourage supplement use. Bed wt during rounds--193#; stable. LFTs elevated.  Current diet tx appropriate.     () Pt NPO until ST complete swallow eval; RN reported pt was tolerating soft/bite size diet prior; ate approx 25% breakfast meal; no N/V; admitted with SOB. Pt stated does not like oatmeal; willing to try ONS when diet progressed. Per EMR --average wt approx 189-196#.     Anthropometrics    Height: 5' 2" (157.5 cm),Ht 5'2" (estimate)     Last Weight: 87.7 kg (193 lb 5.5 oz) (25 1352), Weight Method: Bed Scale  BMI (Calculated): 35.4  BMI Classification: obese grade I (BMI 30-34.9)      #         Usual Body Weight (UBW), k kg (-196#)  % Usual Body Weight: 100.26     Usual Weight Provided By: EMR weight history    Wt Readings from Last 5 Encounters:   25 87.7 kg (193 lb 5.5 oz)   25 87.1 kg (192 lb)   24 88.9 kg (196 lb)   24 88.9 kg (196 lb)   24 76.7 kg (169 lb)     Weight Change(s) Since Admission: no significant wt change   Wt Readings from Last 1 Encounters:   25 1352 87.7 kg (193 lb 5.5 oz)   25 1357 86.6 kg (191 lb)   25 0601 86.9 kg (191 lb 9.3 oz)   25 0600 86.9 kg (191 lb 9.3 oz)   25 1343 86 kg (189 lb 11.2 oz)   Admit Weight: 86 kg (189 lb 11.2 oz) (25 1343), Weight Method: Bed Scale    Estimated Needs    Weight Used For Calorie Calculations: 86 kg (189 lb 9.5 oz)  Energy Calorie Requirements (kcal): 2150 kcal/d; 25 kcal/kg  Energy Need Method: Kcal/kg  Weight Used For Protein Calculations: 86 kg (189 lb 9.5 oz)  Protein Requirements: 103 gm protein/d; 1.2 gm/kg  Fluid Requirements (mL): 2150 ml/d; 1ml/darryl        Enteral Nutrition     Patient not receiving enteral nutrition at this time.    Parenteral Nutrition     Patient " not receiving parenteral nutrition support at this time.    Evaluation of Received Nutrient Intake    Calories: meeting estimated needs  Protein: meeting estimated needs    Patient Education     Not applicable.    Nutrition Diagnosis     PES: Inadequate oral intake related to acute illness as evidenced by NPO status . (resolved)     PES:            Nutrition Interventions     Intervention(s): general/healthful diet, commercial beverage, multivitamin/mineral supplement therapy, and collaboration with other providers  Intervention(s):      Goal: Meet greater than 80% of nutritional needs by follow-up. (goal progressing)  Goal: Maintain weight throughout hospitalization. (goal progressing)    Nutrition Goals & Monitoring     Dietitian will monitor: food and beverage intake and weight  Discharge planning: continue minced/moist diet  Nutrition Risk/Follow-Up: dietitian will follow-up one time per week   Please consult if re-assessment needed sooner.

## 2025-07-15 VITALS
HEART RATE: 105 BPM | WEIGHT: 193.31 LBS | SYSTOLIC BLOOD PRESSURE: 133 MMHG | TEMPERATURE: 98 F | OXYGEN SATURATION: 95 % | BODY MASS INDEX: 35.57 KG/M2 | RESPIRATION RATE: 16 BRPM | HEIGHT: 62 IN | DIASTOLIC BLOOD PRESSURE: 97 MMHG

## 2025-07-15 LAB
ALBUMIN SERPL-MCNC: 3.4 G/DL (ref 3.5–5)
ALBUMIN/GLOB SERPL: 0.9 RATIO (ref 1.1–2)
ALP SERPL-CCNC: 87 UNIT/L (ref 40–150)
ALT SERPL-CCNC: 190 UNIT/L (ref 0–55)
ANION GAP SERPL CALC-SCNC: 6 MEQ/L
AST SERPL-CCNC: 99 UNIT/L (ref 11–45)
BASOPHILS # BLD AUTO: 0.03 X10(3)/MCL
BASOPHILS NFR BLD AUTO: 0.3 %
BILIRUB SERPL-MCNC: 0.3 MG/DL
BUN SERPL-MCNC: 17.4 MG/DL (ref 8.9–20.6)
CALCIUM SERPL-MCNC: 8.9 MG/DL (ref 8.4–10.2)
CHLORIDE SERPL-SCNC: 102 MMOL/L (ref 98–107)
CO2 SERPL-SCNC: 24 MMOL/L (ref 22–29)
CREAT SERPL-MCNC: 0.83 MG/DL (ref 0.72–1.25)
CREAT/UREA NIT SERPL: 21
EOSINOPHIL # BLD AUTO: 0.01 X10(3)/MCL (ref 0–0.9)
EOSINOPHIL NFR BLD AUTO: 0.1 %
ERYTHROCYTE [DISTWIDTH] IN BLOOD BY AUTOMATED COUNT: 13.6 % (ref 11.5–17)
GFR SERPLBLD CREATININE-BSD FMLA CKD-EPI: >60 ML/MIN/1.73/M2
GLOBULIN SER-MCNC: 3.7 GM/DL (ref 2.4–3.5)
GLUCOSE SERPL-MCNC: 98 MG/DL (ref 74–100)
HCT VFR BLD AUTO: 47.5 % (ref 42–52)
HGB BLD-MCNC: 15.9 G/DL (ref 14–18)
IMM GRANULOCYTES # BLD AUTO: 0.07 X10(3)/MCL (ref 0–0.04)
IMM GRANULOCYTES NFR BLD AUTO: 0.7 %
LYMPHOCYTES # BLD AUTO: 2.59 X10(3)/MCL (ref 0.6–4.6)
LYMPHOCYTES NFR BLD AUTO: 25.1 %
MCH RBC QN AUTO: 28.4 PG (ref 27–31)
MCHC RBC AUTO-ENTMCNC: 33.5 G/DL (ref 33–36)
MCV RBC AUTO: 85 FL (ref 80–94)
MONOCYTES # BLD AUTO: 1.29 X10(3)/MCL (ref 0.1–1.3)
MONOCYTES NFR BLD AUTO: 12.5 %
NEUTROPHILS # BLD AUTO: 6.31 X10(3)/MCL (ref 2.1–9.2)
NEUTROPHILS NFR BLD AUTO: 61.3 %
NRBC BLD AUTO-RTO: 0 %
OHS QRS DURATION: 74 MS
OHS QRS DURATION: 78 MS
OHS QTC CALCULATION: 454 MS
OHS QTC CALCULATION: 464 MS
PLATELET # BLD AUTO: 330 X10(3)/MCL (ref 130–400)
PMV BLD AUTO: 9 FL (ref 7.4–10.4)
POTASSIUM SERPL-SCNC: 4.3 MMOL/L (ref 3.5–5.1)
PROT SERPL-MCNC: 7.1 GM/DL (ref 6.4–8.3)
RBC # BLD AUTO: 5.59 X10(6)/MCL (ref 4.7–6.1)
SODIUM SERPL-SCNC: 132 MMOL/L (ref 136–145)
WBC # BLD AUTO: 10.3 X10(3)/MCL (ref 4.5–11.5)

## 2025-07-15 PROCEDURE — 94664 DEMO&/EVAL PT USE INHALER: CPT

## 2025-07-15 PROCEDURE — 63600175 PHARM REV CODE 636 W HCPCS

## 2025-07-15 PROCEDURE — 94799 UNLISTED PULMONARY SVC/PX: CPT

## 2025-07-15 PROCEDURE — 25000003 PHARM REV CODE 250: Performed by: FAMILY MEDICINE

## 2025-07-15 PROCEDURE — 94761 N-INVAS EAR/PLS OXIMETRY MLT: CPT

## 2025-07-15 PROCEDURE — 25000242 PHARM REV CODE 250 ALT 637 W/ HCPCS

## 2025-07-15 PROCEDURE — 99900035 HC TECH TIME PER 15 MIN (STAT)

## 2025-07-15 PROCEDURE — 85025 COMPLETE CBC W/AUTO DIFF WBC: CPT

## 2025-07-15 PROCEDURE — 36415 COLL VENOUS BLD VENIPUNCTURE: CPT

## 2025-07-15 PROCEDURE — 80053 COMPREHEN METABOLIC PANEL: CPT

## 2025-07-15 PROCEDURE — 25000003 PHARM REV CODE 250

## 2025-07-15 PROCEDURE — 94640 AIRWAY INHALATION TREATMENT: CPT

## 2025-07-15 PROCEDURE — 27000173 HC ACAPELLA DEVICE DH OR DM

## 2025-07-15 RX ADMIN — PREDNISONE 30 MG: 20 TABLET ORAL at 09:07

## 2025-07-15 RX ADMIN — FLUTICASONE PROPIONATE 100 MCG: 50 SPRAY, METERED NASAL at 09:07

## 2025-07-15 RX ADMIN — PANTOPRAZOLE SODIUM 40 MG: 40 TABLET, DELAYED RELEASE ORAL at 09:07

## 2025-07-15 RX ADMIN — ASPIRIN 81 MG: 81 TABLET, COATED ORAL at 09:07

## 2025-07-15 RX ADMIN — FOLIC ACID 1 MG: 1 TABLET ORAL at 09:07

## 2025-07-15 RX ADMIN — METOPROLOL SUCCINATE 100 MG: 50 TABLET, EXTENDED RELEASE ORAL at 09:07

## 2025-07-15 RX ADMIN — IPRATROPIUM BROMIDE AND ALBUTEROL SULFATE 3 ML: .5; 3 SOLUTION RESPIRATORY (INHALATION) at 07:07

## 2025-07-15 RX ADMIN — OXCARBAZEPINE 600 MG: 150 TABLET, FILM COATED ORAL at 09:07

## 2025-07-15 RX ADMIN — LOSARTAN POTASSIUM 50 MG: 25 TABLET, FILM COATED ORAL at 09:07

## 2025-07-15 RX ADMIN — DILTIAZEM HYDROCHLORIDE 120 MG: 120 CAPSULE, COATED, EXTENDED RELEASE ORAL at 09:07

## 2025-07-15 RX ADMIN — DOCUSATE SODIUM 100 MG: 100 CAPSULE, LIQUID FILLED ORAL at 09:07

## 2025-07-15 RX ADMIN — CETIRIZINE HYDROCHLORIDE 10 MG: 10 TABLET, FILM COATED ORAL at 09:07

## 2025-07-15 RX ADMIN — IPRATROPIUM BROMIDE AND ALBUTEROL SULFATE 3 ML: .5; 3 SOLUTION RESPIRATORY (INHALATION) at 01:07

## 2025-07-17 ENCOUNTER — TELEPHONE (OUTPATIENT)
Dept: FAMILY MEDICINE | Facility: CLINIC | Age: 40
End: 2025-07-17
Payer: MEDICAID

## 2025-07-17 NOTE — TELEPHONE ENCOUNTER
Home Health lab:  Date collected: 7/16/2025    Na @ 132, level acceptable, pt asymptomatic. LFTs noted on recent hospital admission (AST/ALT  99/190 on 7/15/2025) continue to improve on this lab @ 59/152. Will continue to monitor.      Kang Merrill MD, [facility] Family Medicine Resident, [facility]  11:00 AM

## 2025-07-31 ENCOUNTER — OFFICE VISIT (OUTPATIENT)
Dept: FAMILY MEDICINE | Facility: CLINIC | Age: 40
End: 2025-07-31
Payer: MEDICAID

## 2025-07-31 VITALS
RESPIRATION RATE: 20 BRPM | SYSTOLIC BLOOD PRESSURE: 131 MMHG | DIASTOLIC BLOOD PRESSURE: 85 MMHG | WEIGHT: 195 LBS | BODY MASS INDEX: 35.88 KG/M2 | OXYGEN SATURATION: 96 % | HEIGHT: 62 IN | TEMPERATURE: 98 F | HEART RATE: 72 BPM

## 2025-07-31 DIAGNOSIS — R79.89 ELEVATED LFTS: ICD-10-CM

## 2025-07-31 DIAGNOSIS — Z09 HOSPITAL DISCHARGE FOLLOW-UP: Primary | ICD-10-CM

## 2025-07-31 DIAGNOSIS — E87.1 CHRONIC HYPONATREMIA: ICD-10-CM

## 2025-07-31 LAB
ALBUMIN SERPL-MCNC: 3.5 G/DL (ref 3.5–5)
ALBUMIN/GLOB SERPL: 0.9 RATIO (ref 1.1–2)
ALP SERPL-CCNC: 128 UNIT/L (ref 40–150)
ALT SERPL-CCNC: 26 UNIT/L (ref 0–55)
ANION GAP SERPL CALC-SCNC: 12 MEQ/L
AST SERPL-CCNC: 16 UNIT/L (ref 11–45)
BILIRUB SERPL-MCNC: 0.4 MG/DL
BUN SERPL-MCNC: 7.2 MG/DL (ref 8.9–20.6)
CALCIUM SERPL-MCNC: 8.8 MG/DL (ref 8.4–10.2)
CHLORIDE SERPL-SCNC: 92 MMOL/L (ref 98–107)
CO2 SERPL-SCNC: 25 MMOL/L (ref 22–29)
CREAT SERPL-MCNC: 0.97 MG/DL (ref 0.72–1.25)
CREAT/UREA NIT SERPL: 7
GFR SERPLBLD CREATININE-BSD FMLA CKD-EPI: >60 ML/MIN/1.73/M2
GLOBULIN SER-MCNC: 4 GM/DL (ref 2.4–3.5)
GLUCOSE SERPL-MCNC: 97 MG/DL (ref 74–100)
POTASSIUM SERPL-SCNC: 3.8 MMOL/L (ref 3.5–5.1)
PROT SERPL-MCNC: 7.5 GM/DL (ref 6.4–8.3)
SODIUM SERPL-SCNC: 129 MMOL/L (ref 136–145)

## 2025-07-31 PROCEDURE — 80053 COMPREHEN METABOLIC PANEL: CPT

## 2025-07-31 PROCEDURE — 99215 OFFICE O/P EST HI 40 MIN: CPT | Mod: PBBFAC

## 2025-07-31 NOTE — PROGRESS NOTES
[facility] Family [facility] Clinic Note    Subjective:     Patient ID: Anuj Caldwell is a 39 y.o. male    Chief Complaint:   Chief Complaint   Patient presents with    Hospital Follow Up       HPI  Anuj Caldwell is a 39 y.o. male who presents for hospital follow-up    Hospital follow-up  Elevated LFTS  -7/7/2025 for atypical pneumonia and UTI  -IV Cefepime, azithromycin, and Flagyl  x7 days s + oral prednisone  -On day 4 of antibiotics, urine culture from this admission resulted positive for Klebsiella oxytoca with sensitivity to Rocephin, at which time pt was switched to Rocephin   -Transamnitis suspected to be 2/2 Roephin  -For respiratory and airway support AVAPS started  -Discharged home on 07/15/2025 with NIV with AVAPS-AE  and cough assistance device. Working well, patient has no concerns.       Current Outpatient Medications   Medication Instructions    aspirin (ECOTRIN) 81 mg, Oral, Daily    brexpiprazole (REXULTI) 2 mg    cetirizine (ZYRTEC) 10 mg, Oral, Daily    diltiaZEM (DILACOR XR) 240 mg, Oral, 2 times daily    docusate sodium (COLACE) 100 mg, 2 times daily    fluticasone propionate (FLONASE) 100 mcg, Each Nostril, 2 times daily    folic acid (FOLVITE) 1 mg, Daily    hydrOXYzine pamoate (VISTARIL) 25 mg, 2 times daily PRN    losartan (COZAAR) 50 mg, Daily    metoprolol succinate (TOPROL-XL) 100 mg, Daily    mirtazapine (REMERON) 30 mg, Oral, Nightly    mupirocin (BACTROBAN) 2 % ointment Topical (Top), 2 times daily    ondansetron (ZOFRAN-ODT) 8 mg, Oral, 2 times daily    OXcarbazepine (TRILEPTAL) 600 mg, Oral, 2 times daily PRN    pantoprazole (PROTONIX) 40 mg, Oral, Daily    phenazopyridine (PYRIDIUM) 200 mg, Daily PRN    tacrolimus (PROTOPIC) 0.1 % ointment Topical (Top), 2 times daily    triamcinolone acetonide 0.1% (KENALOG) 0.1 % cream Topical (Top), 2 times daily    UNABLE TO FIND DME name: Hospital bed mattress  Duration: 99 months  Diagnosis: Friedreich ataxia, code: G11.11    UNABLE TO FIND Hospital  "bed mattress, length of need -  99, dx CodeG11.11 Fredreich 's Ataxia    UNABLE TO FIND Hospital bed mattress,length of need -  99, dx CodeG11.11 Fredreich 's Ataxia    UNABLE TO FIND Diagnosis: Friedreich's Ataxia  DME: Wheel chair  Duration: Lifetime  Specification:    -Head and neck rest   -with tilt mechanism   -adult regular size to fit patient's need.    vortioxetine (TRINTELLIX) 20 mg       Review of Systems  As per HPI    Objective:         7/7/2025     1:43 PM 7/9/2025     6:01 AM 7/31/2025     4:05 PM   Vitals - 1 value per visit   SYSTOLIC 136  131   DIASTOLIC 92  85   Pulse 76  72   Temp 98.4 °F (36.9 °C)  98.1 °F (36.7 °C)   Resp 24  20   SPO2 94 %  96 %   Weight (lb) 189.7  195   Weight (kg) 86.047  88.451   Height  5' 2" (1.575 m) 5' 2" (1.575 m)   BMI (Calculated) 34.7  35.7     Physical Exam  Gen: No acute distress, wheelchair-bound, mother present in room  CV: RRR, no murmurs. No LE edema.  Resp: CTAB, breathing non labored on room air.  Abd: Soft, non-distended, non-tender, bowel sounds normoactive, suprapubic catheter in place  MSK: significant spasticity present on all extremities    Assessment & Plan     Assessment & Plan  1. Hospital discharge follow-up    2. Elevated LFTs    3. Chronic hyponatremia      Plan:  -Patient doing well, no concerns since discharge from hospital  -Transaminitis has resolved, AST/ALT - 16/26 on CMP   -Na+ @ 129, patient is completely asymptomatic, continue salt tab (thermotabs) 425 mg TID. Will continue to monitor with Home health routine labs  -Keep appt with Neurology on 10/15/2025  -ED precaution given  -Continue to use NIV and cough assistive device at home.       Orders Placed This Encounter    Comprehensive Metabolic Panel     Health Maintenance   Topic Date Due    TETANUS VACCINE  08/14/2016    Pneumococcal Vaccines (Age 0-49) (2 of 2 - PPSV23) 05/30/2018    COVID-19 Vaccine (3 - 2024-25 season) 09/01/2024    Influenza Vaccine (1) 09/01/2025    Lipid Panel  " 02/26/2026    Hemoglobin A1c (Diabetic Prevention Screening)  06/06/2026    RSV Vaccine (Age 60+ and Pregnant patients) (1 - 1-dose 75+ series) 10/26/2061    Hepatitis C Screening  Completed    HIV Screening  Completed     Future Appointments   Date Time Provider Department Center   10/15/2025 10:00 AM Isabel San MD [facility] NEURO [place]   10/15/2025 11:30 AM Madison Fowler FNP [facility] ENT [place] [facility]   10/21/2025  2:40 PM Kang Merrill MD [facility] FM RES [place]         RTC in 4 months or sooner if needed.      Kang Merrill MD, MBS  [facility] Family Medicine Resident, -III

## 2025-08-06 ENCOUNTER — TELEPHONE (OUTPATIENT)
Dept: ADMINISTRATIVE | Facility: CLINIC | Age: 40
End: 2025-08-06
Payer: MEDICAID

## 2025-08-18 PROBLEM — N30.01 ACUTE CYSTITIS WITH HEMATURIA: Status: RESOLVED | Noted: 2025-07-07 | Resolved: 2025-08-18

## 2025-08-18 RX ORDER — PANTOPRAZOLE SODIUM 40 MG/1
40 TABLET, DELAYED RELEASE ORAL DAILY
Qty: 90 TABLET | Refills: 1 | Status: SHIPPED | OUTPATIENT
Start: 2025-08-18

## (undated) DEVICE — MOUTHPIECE ENDO 60FR

## (undated) DEVICE — MANIFOLD 4 PORT

## (undated) DEVICE — BLOCK BLOX BITE DENT RIM 54FR

## (undated) DEVICE — KIT SURGICAL COLON .25 1.1OZ